# Patient Record
Sex: MALE | Race: WHITE | NOT HISPANIC OR LATINO | Employment: OTHER | URBAN - METROPOLITAN AREA
[De-identification: names, ages, dates, MRNs, and addresses within clinical notes are randomized per-mention and may not be internally consistent; named-entity substitution may affect disease eponyms.]

---

## 2017-01-16 DIAGNOSIS — R52 PAIN: ICD-10-CM

## 2017-01-16 DIAGNOSIS — I81 PVT (PORTAL VEIN THROMBOSIS): ICD-10-CM

## 2017-01-16 DIAGNOSIS — I10 ESSENTIAL HYPERTENSION: ICD-10-CM

## 2017-01-16 NOTE — TELEPHONE ENCOUNTER
Patient called to schedule f/u appointments. Appointments made for 1/31/17 at 11:00 am and labs at 9:00. Medications refilled per patient's request.

## 2017-01-18 NOTE — TELEPHONE ENCOUNTER
----- Message from Maria T White sent at 1/18/2017 12:41 PM CST -----  Contact: self  Patient [phone] called on Monday 01 16 17 for his prescriptions to be refilled/patient said he gave all the prescription information then but the pharmacy still has not received any refill information from Dr Calzada/patient is out of all his medications now/please send as soon as possible and advise patient     [facility] [place] - [place], [place] - [id number] [facility] [address]  [place] [address]  Phone: [phone] Fax: [phone]

## 2017-01-19 ENCOUNTER — TELEPHONE (OUTPATIENT)
Dept: HEMATOLOGY/ONCOLOGY | Facility: CLINIC | Age: 57
End: 2017-01-19

## 2017-01-19 RX ORDER — LISINOPRIL 20 MG/1
20 TABLET ORAL DAILY
Qty: 30 TABLET | Refills: 6 | Status: ON HOLD | OUTPATIENT
Start: 2017-01-19 | End: 2017-02-15 | Stop reason: HOSPADM

## 2017-01-19 RX ORDER — WARFARIN 2 MG/1
2 TABLET ORAL DAILY
Qty: 30 TABLET | Refills: 1 | Status: SHIPPED | OUTPATIENT
Start: 2017-01-19 | End: 2017-02-07

## 2017-01-19 RX ORDER — OXYCODONE HYDROCHLORIDE 30 MG/1
30 TABLET ORAL 4 TIMES DAILY
Qty: 120 TABLET | Refills: 0 | Status: SHIPPED | OUTPATIENT
Start: 2017-01-19

## 2017-01-19 NOTE — TELEPHONE ENCOUNTER
----- Message from Anand Castellano sent at 1/19/2017 12:24 PM CST -----  Contact: pt  Pt is requesting a refill on all his medications  Call Back#[phone]  Thanks    [facility] [place] [facility] - [facility] [address] [facility] [phone] [facility] [address]  [place] [address]  Phone: [phone] Fax: [phone]

## 2017-01-31 ENCOUNTER — HOSPITAL ENCOUNTER (EMERGENCY)
Facility: HOSPITAL | Age: 57
Discharge: OTHER INSTITUTION | End: 2017-01-31
Attending: EMERGENCY MEDICINE
Payer: MEDICAID

## 2017-01-31 ENCOUNTER — HOSPITAL ENCOUNTER (INPATIENT)
Facility: HOSPITAL | Age: 57
LOS: 3 days | Discharge: HOME | DRG: 445 | End: 2017-02-04
Attending: HOSPITALIST | Admitting: HOSPITALIST
Payer: MEDICAID

## 2017-01-31 ENCOUNTER — OFFICE VISIT (OUTPATIENT)
Dept: HEMATOLOGY/ONCOLOGY | Facility: CLINIC | Age: 57
End: 2017-01-31
Payer: MEDICAID

## 2017-01-31 VITALS
DIASTOLIC BLOOD PRESSURE: 70 MMHG | OXYGEN SATURATION: 98 % | BODY MASS INDEX: 30.47 KG/M2 | WEIGHT: 217.63 LBS | HEIGHT: 71 IN | HEART RATE: 96 BPM | RESPIRATION RATE: 16 BRPM | SYSTOLIC BLOOD PRESSURE: 122 MMHG | TEMPERATURE: 98 F

## 2017-01-31 VITALS
RESPIRATION RATE: 16 BRPM | HEART RATE: 103 BPM | DIASTOLIC BLOOD PRESSURE: 56 MMHG | SYSTOLIC BLOOD PRESSURE: 118 MMHG | HEIGHT: 71 IN | TEMPERATURE: 98 F

## 2017-01-31 DIAGNOSIS — Z09 CHEMOTHERAPY FOLLOW-UP EXAMINATION: ICD-10-CM

## 2017-01-31 DIAGNOSIS — C78.7 METASTASIS TO LIVER: ICD-10-CM

## 2017-01-31 DIAGNOSIS — C77.2 MALIGNANT NEOPLASM METASTATIC TO INTRA-ABDOMINAL LYMPH NODE: Primary | ICD-10-CM

## 2017-01-31 DIAGNOSIS — E80.6 HYPERBILIRUBINEMIA: ICD-10-CM

## 2017-01-31 DIAGNOSIS — I81 PORTAL VEIN THROMBOSIS: ICD-10-CM

## 2017-01-31 DIAGNOSIS — C80.1 BILIARY OBSTRUCTION DUE TO CANCER: Primary | ICD-10-CM

## 2017-01-31 DIAGNOSIS — R16.1 SPLENOMEGALY: ICD-10-CM

## 2017-01-31 DIAGNOSIS — R53.1 WEAKNESS: Primary | ICD-10-CM

## 2017-01-31 DIAGNOSIS — C77.2 MALIGNANT NEOPLASM METASTATIC TO INTRA-ABDOMINAL LYMPH NODE: ICD-10-CM

## 2017-01-31 DIAGNOSIS — F41.9 ANXIETY: ICD-10-CM

## 2017-01-31 DIAGNOSIS — R74.8 ELEVATED ALKALINE PHOSPHATASE LEVEL: ICD-10-CM

## 2017-01-31 DIAGNOSIS — R63.4 WEIGHT LOSS: ICD-10-CM

## 2017-01-31 DIAGNOSIS — K83.1 BILIARY OBSTRUCTION DUE TO CANCER: ICD-10-CM

## 2017-01-31 DIAGNOSIS — Z78.9 ALCOHOL USE: ICD-10-CM

## 2017-01-31 DIAGNOSIS — R17 ELEVATED BILIRUBIN: ICD-10-CM

## 2017-01-31 DIAGNOSIS — K83.1 BILIARY OBSTRUCTION DUE TO CANCER: Primary | ICD-10-CM

## 2017-01-31 DIAGNOSIS — C80.1 BILIARY OBSTRUCTION DUE TO CANCER: ICD-10-CM

## 2017-01-31 DIAGNOSIS — R74.8 ABNORMAL LIVER ENZYMES: ICD-10-CM

## 2017-01-31 DIAGNOSIS — C79.9 MULTIPLE LESIONS OF METASTATIC MALIGNANCY: ICD-10-CM

## 2017-01-31 DIAGNOSIS — G89.4 CHRONIC PAIN SYNDROME: ICD-10-CM

## 2017-01-31 LAB
INR PPP: 1.8
PROTHROMBIN TIME: 18.8 SEC

## 2017-01-31 PROCEDURE — 99285 EMERGENCY DEPT VISIT HI MDM: CPT | Mod: 27

## 2017-01-31 PROCEDURE — 25000003 PHARM REV CODE 250: Performed by: EMERGENCY MEDICINE

## 2017-01-31 PROCEDURE — 11000001 HC ACUTE MED/SURG PRIVATE ROOM

## 2017-01-31 PROCEDURE — 36415 COLL VENOUS BLD VENIPUNCTURE: CPT

## 2017-01-31 PROCEDURE — 99213 OFFICE O/P EST LOW 20 MIN: CPT | Mod: PBBFAC,PO | Performed by: INTERNAL MEDICINE

## 2017-01-31 PROCEDURE — 85610 PROTHROMBIN TIME: CPT

## 2017-01-31 PROCEDURE — 25000003 PHARM REV CODE 250: Performed by: HOSPITALIST

## 2017-01-31 PROCEDURE — 99999 PR PBB SHADOW E&M-EST. PATIENT-LVL III: CPT | Mod: PBBFAC,,, | Performed by: INTERNAL MEDICINE

## 2017-01-31 PROCEDURE — G0379 DIRECT REFER HOSPITAL OBSERV: HCPCS

## 2017-01-31 PROCEDURE — 99215 OFFICE O/P EST HI 40 MIN: CPT | Mod: S$PBB,,, | Performed by: INTERNAL MEDICINE

## 2017-01-31 PROCEDURE — G0378 HOSPITAL OBSERVATION PER HR: HCPCS

## 2017-01-31 RX ORDER — OXYCODONE HYDROCHLORIDE 5 MG/1
30 TABLET ORAL EVERY 6 HOURS PRN
Status: DISCONTINUED | OUTPATIENT
Start: 2017-01-31 | End: 2017-02-04 | Stop reason: HOSPADM

## 2017-01-31 RX ORDER — PANTOPRAZOLE SODIUM 40 MG/1
40 TABLET, DELAYED RELEASE ORAL DAILY
Status: DISCONTINUED | OUTPATIENT
Start: 2017-02-01 | End: 2017-02-04 | Stop reason: HOSPADM

## 2017-01-31 RX ORDER — SODIUM CHLORIDE 9 MG/ML
INJECTION, SOLUTION INTRAVENOUS CONTINUOUS
Status: DISCONTINUED | OUTPATIENT
Start: 2017-01-31 | End: 2017-02-01

## 2017-01-31 RX ORDER — HYDROMORPHONE HYDROCHLORIDE 1 MG/ML
1 INJECTION, SOLUTION INTRAMUSCULAR; INTRAVENOUS; SUBCUTANEOUS EVERY 4 HOURS PRN
Status: DISCONTINUED | OUTPATIENT
Start: 2017-01-31 | End: 2017-02-04 | Stop reason: HOSPADM

## 2017-01-31 RX ORDER — ONDANSETRON 4 MG/1
4 TABLET, ORALLY DISINTEGRATING ORAL
Status: COMPLETED | OUTPATIENT
Start: 2017-01-31 | End: 2017-01-31

## 2017-01-31 RX ORDER — ONDANSETRON 8 MG/1
8 TABLET, ORALLY DISINTEGRATING ORAL EVERY 8 HOURS PRN
Status: DISCONTINUED | OUTPATIENT
Start: 2017-01-31 | End: 2017-02-04 | Stop reason: HOSPADM

## 2017-01-31 RX ORDER — ALPRAZOLAM 0.25 MG/1
0.25 TABLET ORAL 3 TIMES DAILY PRN
Status: DISCONTINUED | OUTPATIENT
Start: 2017-01-31 | End: 2017-02-04 | Stop reason: HOSPADM

## 2017-01-31 RX ORDER — PANTOPRAZOLE SODIUM 20 MG/1
20 TABLET, DELAYED RELEASE ORAL DAILY
Refills: 3 | COMMUNITY
Start: 2017-01-19

## 2017-01-31 RX ORDER — OXYCODONE HYDROCHLORIDE 10 MG/1
30 TABLET ORAL EVERY 6 HOURS PRN
Status: DISCONTINUED | OUTPATIENT
Start: 2017-01-31 | End: 2017-01-31

## 2017-01-31 RX ORDER — OXYCODONE HYDROCHLORIDE 30 MG/1
30 TABLET ORAL
Status: COMPLETED | OUTPATIENT
Start: 2017-01-31 | End: 2017-01-31

## 2017-01-31 RX ORDER — LISINOPRIL 20 MG/1
20 TABLET ORAL DAILY
Status: DISCONTINUED | OUTPATIENT
Start: 2017-02-01 | End: 2017-02-04 | Stop reason: HOSPADM

## 2017-01-31 RX ORDER — AMITRIPTYLINE HYDROCHLORIDE 10 MG/1
10 TABLET, FILM COATED ORAL 2 TIMES DAILY
Status: DISCONTINUED | OUTPATIENT
Start: 2017-01-31 | End: 2017-02-04 | Stop reason: HOSPADM

## 2017-01-31 RX ORDER — ACETAMINOPHEN 325 MG/1
650 TABLET ORAL EVERY 4 HOURS PRN
Status: DISCONTINUED | OUTPATIENT
Start: 2017-01-31 | End: 2017-02-04 | Stop reason: HOSPADM

## 2017-01-31 RX ADMIN — AMITRIPTYLINE HYDROCHLORIDE 10 MG: 10 TABLET, FILM COATED ORAL at 08:01

## 2017-01-31 RX ADMIN — ALPRAZOLAM 0.25 MG: 0.25 TABLET ORAL at 08:01

## 2017-01-31 RX ADMIN — SODIUM CHLORIDE: 0.9 INJECTION, SOLUTION INTRAVENOUS at 08:01

## 2017-01-31 RX ADMIN — ONDANSETRON 4 MG: 4 TABLET, ORALLY DISINTEGRATING ORAL at 05:01

## 2017-01-31 RX ADMIN — OXYCODONE HYDROCHLORIDE 30 MG: 30 TABLET ORAL at 05:01

## 2017-01-31 NOTE — IP AVS SNAPSHOT
\[facility] [place] [facility]\""  [address]  [place] [address]  Phone: [phone]           Patient Discharge Instructions     Our goal is to set you up for success. This packet includes information on your condition, medications, and your home care. It will help you to care for yourself so you don't get sicker and need to go back to the hospital.     Please ask your nurse if you have any questions.        There are many details to remember when preparing to leave the hospital. Here is what you will need to do:    1. Take your medicine. If you are prescribed medications, review your Medication List in the following pages. You may have new medications to  at the pharmacy and others that you'll need to stop taking. Review the instructions for how and when to take your medications. Talk with your doctor or nurses if you are unsure of what to do.     2. Go to your follow-up appointments. Specific follow-up information is listed in the following pages. Your may be contacted by a transition nurse or clinical provider about future appointments. Be sure we have all of the phone numbers to reach you, if needed. Please contact your provider's office if you are unable to make an appointment.     3. Watch for warning signs. Your doctor or nurse will give you detailed warning signs to watch for and when to call for assistance. These instructions may also include educational information about your condition. If you experience any of warning signs to your health, call your doctor.               ** Verify the list of medication(s) below is accurate and up to date. Carry this with you in case of emergency. If your medications have changed, please notify your healthcare provider.             Medication List      CHANGE how you take these medications        Additional Info                      oxycodone 30 MG Tab   Commonly known as:  ROXICODONE   Quantity:  120 tablet   Refills:  0   Dose:  30 mg   What changed:    - when  to take this  - reasons to take this    Last time this was given:  30 mg on 2/4/2017  1:57 AM   Instructions:  Take 1 tablet (30 mg total) by mouth 4 (four) times daily.     Begin Date    AM    Noon    PM    Bedtime         CONTINUE taking these medications        Additional Info                      alprazolam 0.25 MG tablet   Commonly known as:  XANAX   Quantity:  90 tablet   Refills:  0   Dose:  0.25 mg    Last time this was given:  0.25 mg on 1/31/2017  8:59 PM   Instructions:  Take 1 tablet (0.25 mg total) by mouth 3 (three) times daily as needed for Anxiety.     Begin Date    AM    Noon    PM    Bedtime       amitriptyline 10 MG tablet   Commonly known as:  ELAVIL   Quantity:  60 tablet   Refills:  1   Dose:  10 mg    Last time this was given:  10 mg on 2/4/2017  8:47 AM   Instructions:  Take 1 tablet (10 mg total) by mouth 2 (two) times daily.     Begin Date    AM    Noon    PM    Bedtime       lisinopril 20 MG tablet   Commonly known as:  PRINIVIL,ZESTRIL   Quantity:  30 tablet   Refills:  6   Dose:  20 mg    Last time this was given:  20 mg on 2/4/2017  8:47 AM   Instructions:  Take 1 tablet (20 mg total) by mouth once daily.     Begin Date    AM    Noon    PM    Bedtime       ondansetron 8 MG tablet   Commonly known as:  ZOFRAN   Quantity:  30 tablet   Refills:  2   Dose:  8 mg    Instructions:  Take 1 tablet (8 mg total) by mouth every 12 (twelve) hours as needed for Nausea.     Begin Date    AM    Noon    PM    Bedtime       pantoprazole 20 MG tablet   Commonly known as:  PROTONIX   Refills:  3   Dose:  20 mg    Last time this was given:  40 mg on 2/4/2017  8:47 AM   Instructions:  Take 20 mg by mouth once daily.     Begin Date    AM    Noon    PM    Bedtime       prochlorperazine 10 MG tablet   Commonly known as:  COMPAZINE   Quantity:  30 tablet   Refills:  1   Dose:  10 mg    Instructions:  Take 1 tablet (10 mg total) by mouth every 6 (six) hours as needed.     Begin Date    AM    Noon    PM     Bedtime       warfarin 2 MG tablet   Commonly known as:  COUMADIN   Quantity:  30 tablet   Refills:  1   Dose:  2 mg    Instructions:  Take 1 tablet (2 mg total) by mouth Daily.     Begin Date    AM    Noon    PM    Bedtime         STOP taking these medications     esomeprazole magnesium 20 mg Tbec   Commonly known as:  NEXIUM 24HR                  Please bring to all follow up appointments:    1. A copy of your discharge instructions.  2. All medicines you are currently taking in their original bottles.  3. Identification and insurance card.    Please arrive 15 minutes ahead of scheduled appointment time.    Please call 24 hours in advance if you must reschedule your appointment and/or time.        Follow-up Information     Follow up with Luis M Ruelas MD.    Specialty:  Family Medicine    Contact information:    [address]  [place] [address]  [phone]          Follow up with Josh Reyna MD. Schedule an appointment as soon as possible for a visit in 3 weeks.    Specialty:  Gastroenterology    Why:  Biliary Stent Follow Up     Contact information:    [address]  [address]  [facility] [address]  [phone]          Follow up with Karoline Calzada MD.    Specialty:  Hematology and Oncology    Contact information:    [address] [place] [address] [address]  [address]  [place] [address]  [phone]          Discharge Instructions     Future Orders    Activity as tolerated     Call MD for:  persistent nausea and vomiting or diarrhea     Call MD for:  severe uncontrolled pain     Call MD for:  temperature >100.4     Diet general     Comments:    Alliance Diet, Low Fat    Questions:    Total calories:      Fat restriction, if any:      Protein restriction, if any:      Na restriction, if any:      Fluid restriction:      Additional restrictions:          Discharge Instructions       Keep any prior scheduled appointments with your PCP and Dr. Calzada.  Resume your Coumadin 2 mg daily, today and follow up with your Prior coumadin  "management in 1 week.  Make an appointment with Dr. Reyna in 2-4 weeks for biliary stent follow up.     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      Admission Information     Date & Time Provider Department CSN    1/31/2017  8:27 PM Noam Hansen MD [facility] [id number]      Care Providers     Provider Role Specialty Primary office phone    Noam Hansen MD Attending Provider Hospitalist [phone]    Helio Edwards MD Consulting Physician  Gastroenterology [phone]    Paolo Eldridge MD Surgeon  Gastroenterology [phone]    Josh Reyna MD Surgeon  Gastroenterology [phone]      Your Vitals Were     BP Pulse Temp Resp Height Weight    101/55 110 98.4 °F (36.9 °C) (Oral) 18 5' 11" (1.803 m) 91.7 kg (202 lb 2.6 oz)    SpO2 BMI             91% 28.2 kg/m2         Recent Lab Values        12/3/2012                           9:24 AM           A1C 5.5                       Allergies as of 2/4/2017     No Known Allergies      [facility]     [facility] Nurse Care Line - 24/7 Assistance  Unless otherwise directed by your provider, please contact [facility], our nurse care line that is available for 24/7 assistance.     Registered nurses in the [facility] provide clinical advisement, health education, appointment booking, and other advisory services.  Call for this free service at [phone].        Advance Directives     An advance directive is a document which, in the event you are no longer able to make decisions for yourself, tells your healthcare team what kind of treatment you do or do not want to receive, or who you would like to make those decisions for you.  If you do not currently have an advance directive, [facility] encourages you to create one.  For more information call:  [phone] " [phone]), [phone] ([phone]),  or log on to [email or web address].        Smoking Cessation     If you would like to quit smoking:   You may be eligible for free services if you are a [place] resident and started smoking cigarettes before September 1, 1988.  Call the Smoking Cessation Trust ([facility]) toll free at ([phone] or (323) 000-3272.   Call -772-QUIT-NOW if you do not meet the above criteria.            Language Assistance Services     ATTENTION: Language assistance services are available, free of charge. Please call [phone].      ATENCIÓN: Si habla toshia, tiene a stahl disposición servicios gratuitos de asistencia lingüística. Llame al [phone].     CHÚ Ý: N?u b?n nói Ti?ng Vi?t, có các d?ch v? h? tr? ngôn ng? mi?n phí dành cho b?n. G?i s? [phone].        Coumadin Discharge Instructions                          [facility] complies with applicable Federal civil rights laws and does not discriminate on the basis of race, color, national origin, age, disability, or sex.

## 2017-01-31 NOTE — PROGRESS NOTES
Seen in follow up for chemotherapy.    This note was dictated with Dragon and briefly proofread. Please excuse any sentences that may be unclear or words misspelled    CHIEF COMPLAINT:  I'm so weak he can't move in my back is killing me    ONCOLOGICAL HISTORY:  The patient was originally diagnosed with colorectal carcinoma   stage II or III in 2010.  He did receive Xeloda as adjuvant therapy.  In May of   2016, he was found to have a hepatic mass in the central right lobe as well as   posterior mediastinal celiac and retroperitoneal adenopathy with portacaval   adenopathy.  Biopsy of the liver lesion was performed on June 9th and findings   were consistent with metastatic adenocarcinoma from a suspected GI primary.  First cycle of FOLFOX plus AVASTIN was at the end of June 2016.  Patient has now completed 12 cycles of chemotherapy as January 3 of 2017    HPI  Today patient is laying on the examination table and appears extremely weak.  This is extremely unusual for this patient's presentation.  He reports that 3 weeks ago started suffering with worsening back pain and decreasing appetite.  He did drive himself to this appointment and he is unaccompanied by any friends or family members.  States he is unable to perform activities of daily living lately due to his weakness.  He feels that he is unsteady on his feet.  Reports he has not been increasing his alcohol intake.  He assumes his symptoms are from his last dose of chemotherapy.  He is not having any shortness of breath, chest palpitations, nausea vomiting, diarrhea and no fever.  Dr. Rapp his pain management physician remains out of practice currently.  Patient's pain remains stable with Roxicodone 30 mg 4 times a day and Elavil twice a day, low dose 10 mg. patient is tolerating Xanax for anxiety   He is not having any hematochezia melena or hematuria.     Clinically it seems that he is losing weight    He is tolerating Zestril for hypertension and Protonix  for GERD without any complications.      Current Outpatient Prescriptions:     alprazolam (XANAX) 0.25 MG tablet, Take 1 tablet (0.25 mg total) by mouth 3 (three) times daily as needed for Anxiety., Disp: 90 tablet, Rfl: 0    amitriptyline (ELAVIL) 10 MG tablet, Take 1 tablet (10 mg total) by mouth 2 (two) times daily., Disp: 60 tablet, Rfl: 1    ibuprofen (ADVIL,MOTRIN) 800 MG tablet, , Disp: , Rfl:     lidocaine-prilocaine (EMLA) cream, Apply to affected area once, Disp: 5 g, Rfl: 0    lisinopril (PRINIVIL,ZESTRIL) 20 MG tablet, Take 1 tablet (20 mg total) by mouth once daily., Disp: 30 tablet, Rfl: 6    ondansetron (ZOFRAN) 8 MG tablet, Take 1 tablet (8 mg total) by mouth every 12 (twelve) hours as needed for Nausea., Disp: 30 tablet, Rfl: 2    oxycodone (ROXICODONE) 30 MG Tab, Take 1 tablet (30 mg total) by mouth 4 (four) times daily., Disp: 120 tablet, Rfl: 0    pantoprazole (PROTONIX) 20 MG tablet, Take 20 mg by mouth once daily., Disp: , Rfl: 3    prochlorperazine (COMPAZINE) 10 MG tablet, Take 1 tablet (10 mg total) by mouth every 6 (six) hours as needed., Disp: 30 tablet, Rfl: 1    promethazine (PHENERGAN) 25 MG tablet, Take 25 tablets by mouth as needed., Disp: , Rfl: 0    warfarin (COUMADIN) 2 MG tablet, Take 1 tablet (2 mg total) by mouth Daily., Disp: 30 tablet, Rfl: 1    REVIEW OF SYSTEMS:    GENERAL:  No fever or chills.  No unexpected change in weight.  HEENT:  No photophobia, rhinorrhea, sinus congestion, tinnitus, gingival   bleeding, oral ulcers, or sore throat.  RESPIRATORY:  No shortness of breath, cough, or wheezing.  CARDIOVASCULAR:  No chest pain, palpitations, or swelling of the lower   extremities.  GASTROINTESTINAL:  No abdominal pain, dysphagia, emesis, diarrhea, or   constipation.  No heartburn, abdominal distention, melena, or hematochezia.  GENITOURINARY:  No urinary frequency, hesitancy, dysuria, or hematuria.  RHEUM:  No arthralgias or joint swelling.  MUSCULOSKELETAL:  No  "neck pain,  chronic back pain,  noss.  NEUROLOGICAL:  No headaches, dizziness, or change in memory.  Positive paresthesias  PSYCH:  No agitation, change in behavior,  today is definitely anxious no depression   ENDOCRINE:  No hot  + cold intolerance.     PHYSICAL EXAMINATION:  Visit Vitals    BP (!) 118/56    Pulse 103    Temp 97.5 °F (36.4 °C)    Resp 16    Ht 5' 11" (1.803 m)       GENERAL:  Gaunt in appearance and appears weak today  PSYCH:  Flat affect.  No anxiety or depression.  HEENT:  Extraocular movements intact.  Conjunctivae pink.  NECK:  Supple.  Trachea midline.  No palpable abnormalities.  LYMPHATICS:  No cervical or axillary adenopathy.  CHEST:  Clear with no use of accessory muscles during respiration.  Heart with tachycardia  ABDOMEN:  No distention  EXTREMITIES:  No cyanosis, clubbing, edema, or joint effusion.  NEUROLOGICAL:  Tired yet oriented  Cranial nerves grossly intact.  SKIN:  Warm, dry. No rash or lesions noted.        Lab Results   Component Value Date    WBC 7.50 01/31/2017    HGB 15.2 01/31/2017    HCT 45.5 01/31/2017    MCV 97 01/31/2017     (L) 01/31/2017     CMP     Ref Range & Units 9:53 AM   1mo ago       Sodium 136 - 145 mmol/L 133 (L) 140    Potassium 3.5 - 5.1 mmol/L 3.8 4.2    Chloride 95 - 110 mmol/L 100 108    CO2 23 - 29 mmol/L 21 (L) 21 (L)    Glucose 70 - 110 mg/dL 108 109    BUN, Bld 6 - 20 mg/dL 9 6    Creatinine 0.5 - 1.4 mg/dL 0.8 0.8    Calcium 8.7 - 10.5 mg/dL 8.9 9.2    Total Protein 6.0 - 8.4 g/dL 6.9 6.6    Albumin 3.5 - 5.2 g/dL 2.7 (L) 3.4 (L)    Total Bilirubin 0.1 - 1.0 mg/dL 2.2 (H) 1.0CM   Comments: For infants and newborns, interpretation of results should be based   on gestational age, weight and in agreement with clinical   observations.   Premature Infant recommended reference ranges:   Up to 24 hours.............<8.0 mg/dL   Up to 48 hours............<12.0 mg/dL   3-5 days..................<15.0 mg/dL   6-29 days.................<15.0 mg/dL "       Alkaline Phosphatase 55 - 135 U/L 303 (H) 250 (H)    AST 10 - 40 U/L 46 (H) 45 (H)    ALT 10 - 44 U/L 19 31    Anion Gap 8 - 16 mmol/L 12 11    eGFR if African American >60 mL/min/1.73 m^2 >60 >60    eGFR if non African American >60 mL/min/1.73 m^2 >60 >60CM   Comments: Calculation used to obtain the estimated glomerular filtration   rate (eGFR) is the CKD-EPI equation. Since race is unknown   in our information system, the eGFR values for   -American and Non--American patients are given              MRI Abdomen W WO Contrast 09/13/2016 met colon cancer          RESULTS:  Routine multiplanar imaging through this 56-year-old males liver was obtained with utilization of 10 cc of gadolinium postcontrast and comparison was made with 5/31/16     Within segment 8 of the liver there is again noted to be an apparent irregular mass that is ill-defined with a slightly larger T2 bright focus in this region spanning 2.7 x 2.6 cm on today's examination in comparison with 1 cm x 9 mm on the prior examination. The more diffuse faint T2 weighted signal abnormality measures measured on the prior does not appear significantly changed. The hypoenhancing region is not as easily defined as on the prior but appears similar in size at 3.5 x 2.5 cm in size on image 24 of sequence 1403. Distal to this mass region there appears to be thrombosis of the portal vein. Altered perfusion of the right lobe of the liver is noted distal to this mass region. The degree of biliary dilatation seen on the prior appears decreased and the degree of perfusion abnormality appears decreased        A prominent lymph node is noted superior to the pancreatic head adjacent to the inferior vena cava a 3.0 x 1.5 cm suggestive of lymphadenopathy unchanged     Multiple renal signal intensities are noted one at the upper pole and the left and one at the upper pole of the right without obvious evidence of enhancement suggestive of renal  cysts.     Some dependent signal intensity is noted layering within the gallbladder suggestive of biliary sludge or stones. Ultrasound could be performed for confirmation if desired.  IMPRESSION:         1. Overall there appears to be some improvement since 5/31/16 with slightly less perfusion abnormality and biliary dilatation identified. The ill-defined mass is again poorly defined and measured but is suspected to measures similarly on the postcontrast sequences with a slightly larger region of increased T2 signal centrally then on the prior exam. This could relate to necrosis and is of uncertain etiology. 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  I'm concerned that he has possible biliary obstruction or some type of cholangitis  I did explain that chemotherapy is taking especially Avastin can cause white matter disease and unsteady gait but this would not explain the hyperbilirubinemia and elevated alkaline phosphatase  Patient may ultimately need some type of MRCP.  I'm hoping he will meet criteria for admission given his stated weakness and dehydration.  His last chemotherapy was on January 3 slight [facility] which according to the records he  tolerated well.  He has been taking his usual pain medication around the clock to help with his back pain.  He remains on low-dose Coumadin for his portal vein thrombosis    I'll touch base with him after he is assessed in the emergency room    History continue his antidepressants are helping with his psych disorder    Cont PPI for GERD  Cont opioids and elavil

## 2017-01-31 NOTE — MR AVS SNAPSHOT
[place] - [facility]  [address] [place] [address] Drive [address]  [facility] [address]  Phone: [phone]                  [place] Villa   2017 11:00 AM   Office Visit    Description:  Male : 1960   Provider:  Karoline Calzada MD   Department:  [place] [facility]           Reason for Visit     Follow-up     Results           Diagnoses this Visit        Comments    Weakness    -  Primary     Weight loss         Hyperbilirubinemia         Elevated alkaline phosphatase level         Chemotherapy follow-up examination         Alcohol use         Metastasis to liver         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  Call for this free service at [phone].             Medications           Message regarding Medications     Verify the changes and/or additions to your medication regime listed below are the same as discussed with your clinician today.  If any of these changes or additions are incorrect, please notify your healthcare provider.        STOP taking these medications     esomeprazole magnesium (NEXIUM 24HR) 20 mg TbEC Take 20 mg by mouth once daily. Pt must take this medication daily while on chemotherapy and steroids for cancer           Verify that the below list of medications is an accurate representation of the medications you are currently taking.  If none reported, the list may be blank. If incorrect, please contact your  "healthcare provider. Carry this list with you in case of emergency.           Current Medications     alprazolam (XANAX) 0.25 MG tablet Take 1 tablet (0.25 mg total) by mouth 3 (three) times daily as needed for Anxiety.    amitriptyline (ELAVIL) 10 MG tablet Take 1 tablet (10 mg total) by mouth 2 (two) times daily.    ibuprofen (ADVIL,MOTRIN) 800 MG tablet     lidocaine-prilocaine (EMLA) cream Apply to affected area once    lisinopril (PRINIVIL,ZESTRIL) 20 MG tablet Take 1 tablet (20 mg total) by mouth once daily.    ondansetron (ZOFRAN) 8 MG tablet Take 1 tablet (8 mg total) by mouth every 12 (twelve) hours as needed for Nausea.    oxycodone (ROXICODONE) 30 MG Tab Take 1 tablet (30 mg total) by mouth 4 (four) times daily.    pantoprazole (PROTONIX) 20 MG tablet Take 20 mg by mouth once daily.    prochlorperazine (COMPAZINE) 10 MG tablet Take 1 tablet (10 mg total) by mouth every 6 (six) hours as needed.    promethazine (PHENERGAN) 25 MG tablet Take 25 tablets by mouth as needed.    warfarin (COUMADIN) 2 MG tablet Take 1 tablet (2 mg total) by mouth Daily.           Clinical Reference Information           Vital Signs - Last Recorded  Most recent update: 1/31/2017 10:47 AM by Eliane Perkins LPN    BP Pulse Temp Resp Ht       (!) 118/56 103 97.5 °F (36.4 °C) 16 5' 11" (1.803 m)       Blood Pressure          Most Recent Value    BP  (!)  118/56      Allergies as of 1/31/2017     No Known Allergies      Immunizations Administered on Date of Encounter - 1/31/2017     None      "

## 2017-01-31 NOTE — ED PROVIDER NOTES
Encounter Date: 1/31/2017    SCRIBE #1 NOTE: IFern, am scribing for, and in the presence of, Dr. Gallagher.       History     Chief Complaint   Patient presents with    Abnormal Lab     Alk Phos and AST elevated. Last chemo for colon / liver cancer was 4 weeks ago.     Review of patient's allergies indicates:  No Known Allergies  HPI Comments: 01/31/2017  12:47 PM     Chief Complaint: Abnormal Labs      The patient is a 56 y.o. male with a PMHx of arthritis; cancer; colon cancer; degenerative disc disease; foot pain; GERD; and HTN who is presenting with an acute onset of abnormal labs of a mildly elevated bilirubin of 2.2 and elevated Alk Phos noted today. Pt seen at Dr. Calzada's office today for generalized weakness, mobility issues, and difficultly with ADLs. Dr. Calzada sent the pt to the ED due to suspicion for possible biliary obstruction due to abnormal labs. Pt has a hx of metastatic colorectal cancer with metastasis to the liver and his last chemotherapy treatment was 4 wks ago. Upon arrival to ED, pt c/o fatigue, generalized weakness, and losing his balance for the past 2-3 wks. He also c/o no appetite, nausea, back pain, and numbness to his finger tips and bottom of his feet. Pt denied any recent falls or head injuries. No change in fluid intake. No SOB. No abd pain, vomiting, or diarrhea. No difficultly urinating. No swelling. No rashes. No headache. No confusion. Pt has a past surgical history that includes Colon surgery.      The history is provided by the patient and medical records.     Past Medical History   Diagnosis Date    Arthritis      sacroilitis    Cancer 1999     colon cancer    Cataract     Colon cancer     Degenerative disc disease     Foot pain     GERD (gastroesophageal reflux disease)     Hypertension      No past medical history pertinent negatives.  Past Surgical History   Procedure Laterality Date    Colon surgery      Cataract extraction Bilateral      Family  History   Problem Relation Age of Onset    Cancer Mother      ovarian    Cancer Father      bladder    COPD Brother     Cancer Brother      lung    Diabetes Brother     Cancer Paternal Grandfather      Social History   Substance Use Topics    Smoking status: Former Smoker     Packs/day: 0.50     Years: 2.00    Smokeless tobacco: Former User      Comment: smokless tobacco when a teenager     Alcohol use No     Review of Systems   Constitutional: Positive for appetite change (No appetite.) and fatigue. Negative for fever.        No change in fluid intake.   HENT: Negative for sore throat.    Eyes: Negative for visual disturbance.   Respiratory: Negative for shortness of breath.    Cardiovascular: Negative for chest pain.   Gastrointestinal: Positive for nausea. Negative for abdominal pain, diarrhea and vomiting.   Genitourinary: Negative for difficulty urinating.   Musculoskeletal: Positive for back pain.        No swelling.   Skin: Negative for rash.   Neurological: Positive for weakness (Generalized weakness.) and numbness (Numbness to finger tips and bottom of feet.). Negative for headaches.   Hematological: Does not bruise/bleed easily.        +Abnormal labs of mildly elevated bilirubin of 2.2 and elevated Alk Phos.   Psychiatric/Behavioral: Negative for confusion.       Physical Exam   Initial Vitals   BP Pulse Resp Temp SpO2   01/31/17 1211 01/31/17 1211 01/31/17 1211 01/31/17 1211 01/31/17 1211   118/78 110 16 98.1 °F (36.7 °C) 96 %     Physical Exam    Nursing note and vitals reviewed.  Constitutional: He appears well-developed and well-nourished.   HENT:   Head: Normocephalic and atraumatic.   Mouth/Throat: Oropharynx is clear and moist.   Eyes: Conjunctivae and EOM are normal. Pupils are equal, round, and reactive to light.   Neck:   No midline C-spine tenderness.   Cardiovascular: Normal rate, regular rhythm, normal heart sounds and intact distal pulses. Exam reveals no gallop and no friction rub.     No murmur heard.  Pulmonary/Chest: Breath sounds normal. He has no wheezes. He has no rhonchi. He has no rales.   Abdominal: Soft. There is no hepatomegaly. There is no tenderness.   Musculoskeletal:   No midline T-spine or L-spine tenderness.   Neurological: He is alert and oriented to person, place, and time.   Skin:   No jaundice noted.   Psychiatric: He has a normal mood and affect.         ED Course   Procedures  Labs Reviewed   PROTIME-INR - Abnormal; Notable for the following:        Result Value    Prothrombin Time 18.8 (*)     INR 1.8 (*)     All other components within normal limits                        Scribe Attestation:   Scribe #1: I performed the above scribed service and the documentation accurately describes the services I performed. I attest to the accuracy of the note.    Attending Attestation:           Physician Attestation for Scribe:  Physician Attestation Statement for Scribe #1: I, Dr. Gallagher, reviewed documentation, as scribed by Fern Castro in my presence, and it is both accurate and complete.         Berlin Villa is a 56 y.o. male presenting with known metastatic liver disease with new hyperbilirubinemia likely representing obstructive process secondary to mass effect of known malignancy.  MRCP demonstrates worsening obstructive process compared to prior imaging.  He will be transferred for GI availability and consideration of ERCP with possible further intervention.  Bilirubin may be trended as this is a new finding.  Nonspecific generalized symptoms possibly related to underlying malignancy as well as new hyperbilirubinemia.  Nonfocal neurological exam with very low suspicion for central neurologic process.  Patient will be transferred for GI and ERCP interventional capability.        ED Course     Clinical Impression:   The primary encounter diagnosis was Biliary obstruction due to cancer. A diagnosis of Multiple lesions of metastatic malignancy was also pertinent to  this visit.          Marvin Gallagher MD  01/31/17 3967

## 2017-01-31 NOTE — ED NOTES
Pt here for eval of fatigue, decreased appetite, weakness, back pain for several days. Also sent by oncologist for elevated bilirubin and to r/o obstruction. Pt appears fatigued. Aaox3. NAD. resp even and unlabored. Denies n/v/d fever or chills. Denies CP or SOB. Denies abd pain. Equal strength bilat.

## 2017-02-01 PROBLEM — I81 PORTAL VEIN THROMBOSIS: Status: ACTIVE | Noted: 2017-02-01

## 2017-02-01 LAB
ALBUMIN SERPL BCP-MCNC: 2.3 G/DL
ALP SERPL-CCNC: 237 U/L
ALT SERPL W/O P-5'-P-CCNC: 15 U/L
ANION GAP SERPL CALC-SCNC: 9 MMOL/L
AST SERPL-CCNC: 36 U/L
BASOPHILS # BLD AUTO: 0.01 K/UL
BASOPHILS NFR BLD: 0.2 %
BILIRUB DIRECT SERPL-MCNC: 0.9 MG/DL
BILIRUB SERPL-MCNC: 1.6 MG/DL
BUN SERPL-MCNC: 11 MG/DL
CALCIUM SERPL-MCNC: 8.2 MG/DL
CHLORIDE SERPL-SCNC: 102 MMOL/L
CO2 SERPL-SCNC: 21 MMOL/L
CREAT SERPL-MCNC: 0.6 MG/DL
DIFFERENTIAL METHOD: ABNORMAL
EOSINOPHIL # BLD AUTO: 0 K/UL
EOSINOPHIL NFR BLD: 0.5 %
ERYTHROCYTE [DISTWIDTH] IN BLOOD BY AUTOMATED COUNT: 16 %
EST. GFR  (AFRICAN AMERICAN): >60 ML/MIN/1.73 M^2
EST. GFR  (NON AFRICAN AMERICAN): >60 ML/MIN/1.73 M^2
GLUCOSE SERPL-MCNC: 80 MG/DL
HCT VFR BLD AUTO: 39.2 %
HGB BLD-MCNC: 13.2 G/DL
INR PPP: 2.2
LYMPHOCYTES # BLD AUTO: 1.1 K/UL
LYMPHOCYTES NFR BLD: 19 %
MAGNESIUM SERPL-MCNC: 1.8 MG/DL
MCH RBC QN AUTO: 33 PG
MCHC RBC AUTO-ENTMCNC: 33.7 %
MCV RBC AUTO: 98 FL
MONOCYTES # BLD AUTO: 1.1 K/UL
MONOCYTES NFR BLD: 18.3 %
NEUTROPHILS # BLD AUTO: 3.7 K/UL
NEUTROPHILS NFR BLD: 61.8 %
PHOSPHATE SERPL-MCNC: 2.7 MG/DL
PLATELET # BLD AUTO: 102 K/UL
PMV BLD AUTO: 9.7 FL
POTASSIUM SERPL-SCNC: 3.6 MMOL/L
PROT SERPL-MCNC: 5.9 G/DL
PROTHROMBIN TIME: 22.4 SEC
RBC # BLD AUTO: 4 M/UL
SODIUM SERPL-SCNC: 132 MMOL/L
WBC # BLD AUTO: 6.01 K/UL

## 2017-02-01 PROCEDURE — 80076 HEPATIC FUNCTION PANEL: CPT

## 2017-02-01 PROCEDURE — 36415 COLL VENOUS BLD VENIPUNCTURE: CPT

## 2017-02-01 PROCEDURE — 94761 N-INVAS EAR/PLS OXIMETRY MLT: CPT

## 2017-02-01 PROCEDURE — 99233 SBSQ HOSP IP/OBS HIGH 50: CPT | Mod: ,,, | Performed by: INTERNAL MEDICINE

## 2017-02-01 PROCEDURE — 25000003 PHARM REV CODE 250: Performed by: PHYSICIAN ASSISTANT

## 2017-02-01 PROCEDURE — 11000001 HC ACUTE MED/SURG PRIVATE ROOM

## 2017-02-01 PROCEDURE — 84100 ASSAY OF PHOSPHORUS: CPT

## 2017-02-01 PROCEDURE — 83735 ASSAY OF MAGNESIUM: CPT

## 2017-02-01 PROCEDURE — 85025 COMPLETE CBC W/AUTO DIFF WBC: CPT

## 2017-02-01 PROCEDURE — 80048 BASIC METABOLIC PNL TOTAL CA: CPT

## 2017-02-01 PROCEDURE — 85610 PROTHROMBIN TIME: CPT

## 2017-02-01 PROCEDURE — 25000003 PHARM REV CODE 250: Performed by: HOSPITALIST

## 2017-02-01 RX ORDER — SODIUM CHLORIDE 9 MG/ML
INJECTION, SOLUTION INTRAVENOUS CONTINUOUS
Status: DISCONTINUED | OUTPATIENT
Start: 2017-02-02 | End: 2017-02-03

## 2017-02-01 RX ORDER — PHYTONADIONE 5 MG/1
5 TABLET ORAL ONCE
Status: COMPLETED | OUTPATIENT
Start: 2017-02-01 | End: 2017-02-01

## 2017-02-01 RX ADMIN — OXYCODONE HYDROCHLORIDE 30 MG: 5 TABLET ORAL at 06:02

## 2017-02-01 RX ADMIN — AMITRIPTYLINE HYDROCHLORIDE 10 MG: 10 TABLET, FILM COATED ORAL at 08:02

## 2017-02-01 RX ADMIN — SODIUM CHLORIDE 1000 ML: 0.9 INJECTION, SOLUTION INTRAVENOUS at 11:02

## 2017-02-01 RX ADMIN — SODIUM CHLORIDE: 0.9 INJECTION, SOLUTION INTRAVENOUS at 08:02

## 2017-02-01 RX ADMIN — OXYCODONE HYDROCHLORIDE 30 MG: 5 TABLET ORAL at 09:02

## 2017-02-01 RX ADMIN — PANTOPRAZOLE SODIUM 40 MG: 40 TABLET, DELAYED RELEASE ORAL at 08:02

## 2017-02-01 RX ADMIN — LISINOPRIL 20 MG: 20 TABLET ORAL at 08:02

## 2017-02-01 RX ADMIN — PHYTONADIONE 5 MG: 5 TABLET ORAL at 08:02

## 2017-02-01 NOTE — SUBJECTIVE & OBJECTIVE
Past Medical History   Diagnosis Date    Arthritis      sacroilitis    Cancer 1999     colon cancer    Cataract     Colon cancer     Degenerative disc disease     Foot pain     GERD (gastroesophageal reflux disease)     Hypertension        Past Surgical History   Procedure Laterality Date    Colon surgery      Cataract extraction Bilateral        Review of patient's allergies indicates:  No Known Allergies    Current Facility-Administered Medications on File Prior to Encounter   Medication    [COMPLETED] ondansetron disintegrating tablet 4 mg    [COMPLETED] oxycodone immediate release tablet 30 mg     Current Outpatient Prescriptions on File Prior to Encounter   Medication Sig    alprazolam (XANAX) 0.25 MG tablet Take 1 tablet (0.25 mg total) by mouth 3 (three) times daily as needed for Anxiety.    amitriptyline (ELAVIL) 10 MG tablet Take 1 tablet (10 mg total) by mouth 2 (two) times daily.    ibuprofen (ADVIL,MOTRIN) 800 MG tablet Take 800 mg by mouth 3 (three) times daily as needed for Pain.     lidocaine-prilocaine (EMLA) cream Apply to affected area once    lisinopril (PRINIVIL,ZESTRIL) 20 MG tablet Take 1 tablet (20 mg total) by mouth once daily.    ondansetron (ZOFRAN) 8 MG tablet Take 1 tablet (8 mg total) by mouth every 12 (twelve) hours as needed for Nausea.    oxycodone (ROXICODONE) 30 MG Tab Take 1 tablet (30 mg total) by mouth 4 (four) times daily. (Patient taking differently: Take 30 mg by mouth every 6 (six) hours as needed (pain). )    pantoprazole (PROTONIX) 20 MG tablet Take 20 mg by mouth once daily.    prochlorperazine (COMPAZINE) 10 MG tablet Take 1 tablet (10 mg total) by mouth every 6 (six) hours as needed.    promethazine (PHENERGAN) 25 MG tablet Take 25 tablets by mouth as needed.    warfarin (COUMADIN) 2 MG tablet Take 1 tablet (2 mg total) by mouth Daily.     Family History     Problem Relation (Age of Onset)    COPD Brother    Cancer Mother, Father, Brother, Paternal  Grandfather    Diabetes Brother        Social History Main Topics    Smoking status: Former Smoker     Packs/day: 0.50     Years: 2.00    Smokeless tobacco: Former User      Comment: smokless tobacco when a teenager     Alcohol use No    Drug use: No    Sexual activity: Yes     Partners: Female     Review of Systems   Constitutional: Positive for fatigue. Negative for chills and fever.   HENT: Negative for congestion, postnasal drip, sneezing and sore throat.    Eyes: Negative for discharge, redness and itching.   Respiratory: Negative for cough, shortness of breath and wheezing.    Cardiovascular: Negative for chest pain, palpitations and leg swelling.   Gastrointestinal: Negative for abdominal distention, abdominal pain, blood in stool, constipation, diarrhea, nausea and vomiting.   Endocrine: Negative for polydipsia, polyphagia and polyuria.   Genitourinary: Negative for difficulty urinating, dysuria, flank pain, frequency, hematuria and urgency.   Musculoskeletal: Positive for back pain. Negative for arthralgias and myalgias.   Skin: Negative for pallor, rash and wound.   Neurological: Positive for weakness. Negative for dizziness, syncope, light-headedness, numbness and headaches.   Psychiatric/Behavioral: Negative for agitation and confusion. The patient is not nervous/anxious.      Objective:     Vital Signs (Most Recent):  Temp: 98.9 °F (37.2 °C) (01/31/17 2100)  Pulse: 97 (01/31/17 2100)  Resp: 16 (01/31/17 2100)  BP: 107/72 (01/31/17 2100)  SpO2: 98 % (02/01/17 0300) Vital Signs (24h Range):  Temp:  [97.5 °F (36.4 °C)-98.9 °F (37.2 °C)] 98.9 °F (37.2 °C)  Pulse:  [] 97  Resp:  [16] 16  SpO2:  [96 %-98 %] 98 %  BP: (107-122)/(56-78) 107/72     Weight: 91.7 kg (202 lb 2.6 oz)  Body mass index is 28.2 kg/(m^2).    Physical Exam   Constitutional: He is oriented to person, place, and time. He appears well-developed and well-nourished. No distress.   Sleeping soundly after receiving pain medicine.   Awakens easily    HENT:   Head: Normocephalic and atraumatic.   Mouth/Throat: Oropharynx is clear and moist. No oropharyngeal exudate.   Eyes: EOM are normal. Pupils are equal, round, and reactive to light.   Neck: Normal range of motion. Neck supple. No thyromegaly present.   Cardiovascular: Normal rate and regular rhythm.    No murmur heard.  Pulmonary/Chest: Effort normal and breath sounds normal. No respiratory distress. He has no wheezes. He exhibits no tenderness.   Abdominal: Soft. Bowel sounds are normal. He exhibits distension. There is no tenderness. There is no rebound and no guarding.   Questionable ascites    Musculoskeletal: He exhibits no edema.   Neurological: He is alert and oriented to person, place, and time.   Skin: Skin is warm and dry. No rash noted. He is not diaphoretic.   Psychiatric: He has a normal mood and affect. Thought content normal.   Nursing note and vitals reviewed.       Significant Labs:   CBC:   Recent Labs  Lab 01/31/17  0950   WBC 7.50   HGB 15.2   HCT 45.5   *     CMP:   Recent Labs  Lab 01/31/17  0953   *   K 3.8      CO2 21*      BUN 9   CREATININE 0.8   CALCIUM 8.9   PROT 6.9   ALBUMIN 2.7*   BILITOT 2.2*   ALKPHOS 303*   AST 46*   ALT 19   ANIONGAP 12   EGFRNONAA >60     Coagulation:   Recent Labs  Lab 01/31/17  1707   INR 1.8*       Significant Imaging:     MRI MRCP without contrast 1/31/2017  1. Right hepatic lobe mass, with apparent interval enlargement. Associated biliary obstruction has mildly worsened. Occlusion of the right portal vein is also present as before, with evidence of portal hypertension including splenomegaly and new, small volume ascites.  2. Moderate gallbladder wall edema. This is most finding as is commonly seen in the setting of ascites.  3. Enlarged peripancreatic lymph node, mildly increased in size.

## 2017-02-01 NOTE — ASSESSMENT & PLAN NOTE
Abnormal liver enzymes  Elevated bilirubin  MRI MRCP as above.  Elevated alk phos and t bili.   Dr. Abel GI Consult:   Elevated LFTs/Biliary obstruction/Weakness  - Vit K given, will check so INR improves  - plan for ERCP with possible stent placement  - risk factors: metastatic cancer, htn, chronic pain  - monitor LFTs  - ok for enteral nutrition today

## 2017-02-01 NOTE — ASSESSMENT & PLAN NOTE
Plt 136 today, improving with outpatient transfusion of platelets.  On warfarin for h/o portal vein thrombosis.  No additional anticoagulation.  No visible bleeding. Holding. Vit K. Today   Monitor.

## 2017-02-01 NOTE — PROGRESS NOTES
Outside Transfer Summary Note    Transferring Physician or Mid Level Provider/Speciality: Emergency Medicine - Dr. Gallagher    Transferring Facility/Hospital: [facility]    Accepting Physician/CORI: Noam Hansen     Date of Acceptance: 01/31/2017     Code Status: Full    Allergies: NKDA    Reason for Transfer to [facility]: ERCP    Report from Transferring Physician or Mid-Level provider/Hospital course: Mr. Villa is a 55 yo WM with metastatic colon cancer to the liver that is undergoing chemotherapy with Dr. Calzada that presented to her office today complaining of increased back/abdominal pain. He was found to have elevation of his TBili to 2.2 from 1 a month prior. He has levations in his AST and Alk Phos as well. He underwent MRI/MRCP today of his abdomen that showed enlargement of the hepatic mass and evidence of right hepatic duct obstruction. He is being transferred to [facility] for evaluation by GI and consideration of ERCP and biliary stenting if needed.     Please call [facility] upon patient arrival to floor.      Noam Hansen MD  Spectralinks: [phone] / [phone]

## 2017-02-01 NOTE — PROGRESS NOTES
[facility]  Progress Note    Patient Name: Berlin Villa  MRN: [id number]  Patient Class: IP- Inpatient   Admission Date: 1/31/2017  Length of Stay: 0 days  Attending Physician: Noam Hansen MD  Primary Care Provider: Luis M Ruelas MD        Subjective:     Principal Problem:Biliary obstruction due to cancer    HPI:  55 yo WM with metastatic colon cancer(originally diagnosed in 2010) to the liver (May 2016) that is currently undergoing chemotherapy with Dr. Calzada that presented to her office today complaining of increased back/abdominal pain. He was found to have elevation of his TBili to 2.2 from 1 a month prior. He has elevations in his AST and Alk Phos as well. He underwent MRI/MRCP today of his abdomen that showed enlargement of the hepatic mass and evidence of right hepatic duct obstruction. He is being transferred to [facility] from [facility] for evaluation by GI and consideration of ERCP and biliary stenting if needed.    His main complaint is back pain for the past month which he describes as bilateral and constant.  States spending a lot of time in bed because of it.  Denies CP, SOB, abdominal pain, N/V/D.      Denies current smoking. States occasional alcohol.  Denies use of illicit drugs.            Hospital Course:       Interval History:   Back pain is under control. Vit K today for INR of 2.2.  Plans for plan for ERCP with possible stent placement tomorrow. NPO after MN    Review of Systems   Constitutional: Positive for fatigue. Negative for chills and fever.   HENT: Negative for congestion.    Respiratory: Negative for shortness of breath and wheezing.    Cardiovascular: Negative for chest pain and palpitations.   Gastrointestinal: Negative for abdominal distention, abdominal pain, nausea and vomiting.   Genitourinary: Negative for urgency.   Musculoskeletal: Positive for back pain. Negative for arthralgias and myalgias.   Skin: Negative for pallor,  rash and wound.     Objective:     Vital Signs (Most Recent):  Temp: 98.3 °F (36.8 °C) (02/01/17 1209)  Pulse: 94 (02/01/17 1209)  Resp: 20 (02/01/17 1209)  BP: (!) 101/57 (02/01/17 1209)  SpO2: (!) 92 % (02/01/17 1213) Vital Signs (24h Range):  Temp:  [96.5 °F (35.8 °C)-98.9 °F (37.2 °C)] 98.3 °F (36.8 °C)  Pulse:  [] 94  Resp:  [16-20] 20  SpO2:  [92 %-98 %] 92 %  BP: (101-122)/(57-72) 101/57     Weight: 91.7 kg (202 lb 2.6 oz)  Body mass index is 28.2 kg/(m^2).  No intake or output data in the 24 hours ending 02/01/17 1517   Physical Exam   Constitutional: He is oriented to person, place, and time. He appears well-developed. No distress.   HENT:   Head: Normocephalic and atraumatic.   Mouth/Throat: Oropharynx is clear and moist. No oropharyngeal exudate.   Eyes: EOM are normal. Pupils are equal, round, and reactive to light.   Neck: Neck supple.   Cardiovascular: Normal rate and regular rhythm.    No murmur heard.  Pulmonary/Chest: Effort normal and breath sounds normal. No respiratory distress. He has no wheezes. He exhibits no tenderness.   Abdominal: Soft. Bowel sounds are normal. He exhibits distension. There is no tenderness.   Questionable ascites    Musculoskeletal: He exhibits no edema.   Neurological: He is alert and oriented to person, place, and time.   Skin: Skin is warm and dry. No rash noted. He is not diaphoretic.   Psychiatric: He has a normal mood and affect. Thought content normal.   Nursing note and vitals reviewed.      Significant Labs:   CBC:   Recent Labs  Lab 01/31/17  0950 02/01/17  0503   WBC 7.50 6.01   HGB 15.2 13.2*   HCT 45.5 39.2*   * 102*     CMP:   Recent Labs  Lab 01/31/17  0953 02/01/17  0503   * 132*   K 3.8 3.6    102   CO2 21* 21*    80   BUN 9 11   CREATININE 0.8 0.6   CALCIUM 8.9 8.2*   PROT 6.9 5.9*   ALBUMIN 2.7* 2.3*   BILITOT 2.2* 1.6*   ALKPHOS 303* 237*   AST 46* 36   ALT 19 15   ANIONGAP 12 9   EGFRNONAA >60 >60       Significant  Imaging:  No new    Assessment/Plan:      * Biliary obstruction due to cancer  Abnormal liver enzymes  Elevated bilirubin  MRI MRCP as above.  Elevated alk phos and t bili.   Dr. Abel GI Consult:   Elevated LFTs/Biliary obstruction/Weakness  - Vit K given, will check so INR improves  - plan for ERCP with possible stent placement  - risk factors: metastatic cancer, htn, chronic pain  - monitor LFTs  - ok for enteral nutrition today      HTN (hypertension), benign  -122.  Resume home lisinopril 20 mg with parameters. Monitor.       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 Calzada.      Anxiety  Pt calm at present.  Takes alprazolam 0.25 mg TID prn which will be resumed here.        Chemotherapy-induced thrombocytopenia  Plt 136 today, improving with outpatient transfusion of platelets.  On warfarin for h/o portal vein thrombosis.  No additional anticoagulation.  No visible bleeding. Holding. Vit K. Today   Monitor.         VTE Risk Mitigation         Ordered     Medium Risk of VTE  Once      01/31/17 2034     Place sequential compression device  Until discontinued      01/31/17 2034     Place RENATA hose  Until discontinued      01/31/17 2034          Zara Garcia NP  Department of Hospital Medicine   [facility]

## 2017-02-01 NOTE — SUBJECTIVE & OBJECTIVE
Interval History:   Back pain is under control. Vit K today for INR of 2.2.  Plans for plan for ERCP with possible stent placement tomorrow. NPO after MN    Review of Systems   Constitutional: Positive for fatigue. Negative for chills and fever.   HENT: Negative for congestion.    Respiratory: Negative for shortness of breath and wheezing.    Cardiovascular: Negative for chest pain and palpitations.   Gastrointestinal: Negative for abdominal distention, abdominal pain, nausea and vomiting.   Genitourinary: Negative for urgency.   Musculoskeletal: Positive for back pain. Negative for arthralgias and myalgias.   Skin: Negative for pallor, rash and wound.     Objective:     Vital Signs (Most Recent):  Temp: 98.3 °F (36.8 °C) (02/01/17 1209)  Pulse: 94 (02/01/17 1209)  Resp: 20 (02/01/17 1209)  BP: (!) 101/57 (02/01/17 1209)  SpO2: (!) 92 % (02/01/17 1213) Vital Signs (24h Range):  Temp:  [96.5 °F (35.8 °C)-98.9 °F (37.2 °C)] 98.3 °F (36.8 °C)  Pulse:  [] 94  Resp:  [16-20] 20  SpO2:  [92 %-98 %] 92 %  BP: (101-122)/(57-72) 101/57     Weight: 91.7 kg (202 lb 2.6 oz)  Body mass index is 28.2 kg/(m^2).  No intake or output data in the 24 hours ending 02/01/17 9467   Physical Exam   Constitutional: He is oriented to person, place, and time. He appears well-developed. No distress.   HENT:   Head: Normocephalic and atraumatic.   Mouth/Throat: Oropharynx is clear and moist. No oropharyngeal exudate.   Eyes: EOM are normal. Pupils are equal, round, and reactive to light.   Neck: Neck supple.   Cardiovascular: Normal rate and regular rhythm.    No murmur heard.  Pulmonary/Chest: Effort normal and breath sounds normal. No respiratory distress. He has no wheezes. He exhibits no tenderness.   Abdominal: Soft. Bowel sounds are normal. He exhibits distension. There is no tenderness.   Questionable ascites    Musculoskeletal: He exhibits no edema.   Neurological: He is alert and oriented to person, place, and time.   Skin: Skin  is warm and dry. No rash noted. He is not diaphoretic.   Psychiatric: He has a normal mood and affect. Thought content normal.   Nursing note and vitals reviewed.      Significant Labs:   CBC:   Recent Labs  Lab 01/31/17  0950 02/01/17  0503   WBC 7.50 6.01   HGB 15.2 13.2*   HCT 45.5 39.2*   * 102*     CMP:   Recent Labs  Lab 01/31/17  0953 02/01/17  0503   * 132*   K 3.8 3.6    102   CO2 21* 21*    80   BUN 9 11   CREATININE 0.8 0.6   CALCIUM 8.9 8.2*   PROT 6.9 5.9*   ALBUMIN 2.7* 2.3*   BILITOT 2.2* 1.6*   ALKPHOS 303* 237*   AST 46* 36   ALT 19 15   ANIONGAP 12 9   EGFRNONAA >60 >60       Significant Imaging:  No new

## 2017-02-01 NOTE — PLAN OF CARE
Patient states his son will pick him up when he is dc'd, is on warfarin which is managed by his heme-onc MD Dr. Calzada, hx of colon ca with chemo.     02/01/17 0849   Discharge Assessment   Assessment Type Discharge Planning Assessment   Confirmed/corrected address and phone number on facesheet? Yes   Assessment information obtained from? Patient   Communicated expected length of stay with patient/caregiver yes   Prior to hospitilization cognitive status: Alert/Oriented   Prior to hospitalization functional status: Independent   Current cognitive status: Alert/Oriented   Current Functional Status: Independent   Arrived From home or self-care   Lives With alone   Able to Return to Prior Arrangements yes   Is patient able to care for self after discharge? Yes   How many people do you have in your home that can help with your care after discharge? 0   Who are your caregiver(s) and their phone number(s)? Berlin Villa Son     [phone]    Patient's perception of discharge disposition home or selfcare   Readmission Within The Last 30 Days no previous admission in last 30 days   Patient currently being followed by outpatient case management? No   Patient currently receives home health services? No   Does the patient currently use HME? No   Patient currently receives private duty nursing? No   Patient currently receives any other outside agency services? No   Equipment Currently Used at Home none   Do you have any problems affording any of your prescribed medications? No   Is the patient taking medications as prescribed? yes   Do you have any financial concerns preventing you from receiving the healthcare you need? No   Does the patient have transportation to healthcare appointments? Yes   Transportation Available family or friend will provide;car   On Dialysis? No   Does the patient receive services at the Coumadin Clinic? (states he is on warfarin and it is managed by his heme-onc MD Dr. Calzada)   Discharge Plan  A Home   Patient/Family In Agreement With Plan yes   Trung Pickering, RN  Transitional Navigator/Case Management  [phone]

## 2017-02-01 NOTE — H&P
[facility] Medicine  History & Physical    Patient Name: Berlin Villa  MRN: [id number]  Admission Date: 1/31/2017  Attending Physician: Noam Hansen MD   Primary Care Provider: Luis M Ruelas MD         Patient information was obtained from patient, past medical records and ER records.     Subjective:     Principal Problem:Biliary obstruction due to cancer    Chief Complaint:  Weakness, back pain      HPI: 57 yo WM with metastatic colon cancer(originally diagnosed in 2010) to the liver (May 2016) that is currently undergoing chemotherapy with Dr. Calzada that presented to her office today complaining of increased back/abdominal pain. He was found to have elevation of his TBili to 2.2 from 1 a month prior. He has elevations in his AST and Alk Phos as well. He underwent MRI/MRCP today of his abdomen that showed enlargement of the hepatic mass and evidence of right hepatic duct obstruction. He is being transferred to [facility] from [facility] for evaluation by GI and consideration of ERCP and biliary stenting if needed.    His main complaint is back pain for the past month which he describes as bilateral and constant.  States spending a lot of time in bed because of it.  Denies CP, SOB, abdominal pain, N/V/D.      Denies current smoking. States occasional alcohol.  Denies use of illicit drugs.            Past Medical History   Diagnosis Date    Arthritis      sacroilitis    Cancer 1999     colon cancer    Cataract     Colon cancer     Degenerative disc disease     Foot pain     GERD (gastroesophageal reflux disease)     Hypertension        Past Surgical History   Procedure Laterality Date    Colon surgery      Cataract extraction Bilateral        Review of patient's allergies indicates:  No Known Allergies    Current Facility-Administered Medications on File Prior to Encounter   Medication    [COMPLETED] ondansetron disintegrating tablet 4 mg    [COMPLETED] oxycodone  immediate release tablet 30 mg     Current Outpatient Prescriptions on File Prior to Encounter   Medication Sig    alprazolam (XANAX) 0.25 MG tablet Take 1 tablet (0.25 mg total) by mouth 3 (three) times daily as needed for Anxiety.    amitriptyline (ELAVIL) 10 MG tablet Take 1 tablet (10 mg total) by mouth 2 (two) times daily.    ibuprofen (ADVIL,MOTRIN) 800 MG tablet Take 800 mg by mouth 3 (three) times daily as needed for Pain.     lidocaine-prilocaine (EMLA) cream Apply to affected area once    lisinopril (PRINIVIL,ZESTRIL) 20 MG tablet Take 1 tablet (20 mg total) by mouth once daily.    ondansetron (ZOFRAN) 8 MG tablet Take 1 tablet (8 mg total) by mouth every 12 (twelve) hours as needed for Nausea.    oxycodone (ROXICODONE) 30 MG Tab Take 1 tablet (30 mg total) by mouth 4 (four) times daily. (Patient taking differently: Take 30 mg by mouth every 6 (six) hours as needed (pain). )    pantoprazole (PROTONIX) 20 MG tablet Take 20 mg by mouth once daily.    prochlorperazine (COMPAZINE) 10 MG tablet Take 1 tablet (10 mg total) by mouth every 6 (six) hours as needed.    promethazine (PHENERGAN) 25 MG tablet Take 25 tablets by mouth as needed.    warfarin (COUMADIN) 2 MG tablet Take 1 tablet (2 mg total) by mouth Daily.     Family History     Problem Relation (Age of Onset)    COPD Brother    Cancer Mother, Father, Brother, Paternal Grandfather    Diabetes Brother        Social History Main Topics    Smoking status: Former Smoker     Packs/day: 0.50     Years: 2.00    Smokeless tobacco: Former User      Comment: smokless tobacco when a teenager     Alcohol use No    Drug use: No    Sexual activity: Yes     Partners: Female     Review of Systems   Constitutional: Positive for fatigue. Negative for chills and fever.   HENT: Negative for congestion, postnasal drip, sneezing and sore throat.    Eyes: Negative for discharge, redness and itching.   Respiratory: Negative for cough, shortness of breath and  wheezing.    Cardiovascular: Negative for chest pain, palpitations and leg swelling.   Gastrointestinal: Negative for abdominal distention, abdominal pain, blood in stool, constipation, diarrhea, nausea and vomiting.   Endocrine: Negative for polydipsia, polyphagia and polyuria.   Genitourinary: Negative for difficulty urinating, dysuria, flank pain, frequency, hematuria and urgency.   Musculoskeletal: Positive for back pain. Negative for arthralgias and myalgias.   Skin: Negative for pallor, rash and wound.   Neurological: Positive for weakness. Negative for dizziness, syncope, light-headedness, numbness and headaches.   Psychiatric/Behavioral: Negative for agitation and confusion. The patient is not nervous/anxious.      Objective:     Vital Signs (Most Recent):  Temp: 98.9 °F (37.2 °C) (01/31/17 2100)  Pulse: 97 (01/31/17 2100)  Resp: 16 (01/31/17 2100)  BP: 107/72 (01/31/17 2100)  SpO2: 98 % (02/01/17 0300) Vital Signs (24h Range):  Temp:  [97.5 °F (36.4 °C)-98.9 °F (37.2 °C)] 98.9 °F (37.2 °C)  Pulse:  [] 97  Resp:  [16] 16  SpO2:  [96 %-98 %] 98 %  BP: (107-122)/(56-78) 107/72     Weight: 91.7 kg (202 lb 2.6 oz)  Body mass index is 28.2 kg/(m^2).    Physical Exam   Constitutional: He is oriented to person, place, and time. He appears well-developed and well-nourished. No distress.   Sleeping soundly after receiving pain medicine.  Awakens easily    HENT:   Head: Normocephalic and atraumatic.   Mouth/Throat: Oropharynx is clear and moist. No oropharyngeal exudate.   Eyes: EOM are normal. Pupils are equal, round, and reactive to light.   Neck: Normal range of motion. Neck supple. No thyromegaly present.   Cardiovascular: Normal rate and regular rhythm.    No murmur heard.  Pulmonary/Chest: Effort normal and breath sounds normal. No respiratory distress. He has no wheezes. He exhibits no tenderness.   Abdominal: Soft. Bowel sounds are normal. He exhibits distension. There is no tenderness. There is no  rebound and no guarding.   Questionable ascites    Musculoskeletal: He exhibits no edema.   Neurological: He is alert and oriented to person, place, and time.   Skin: Skin is warm and dry. No rash noted. He is not diaphoretic.   Psychiatric: He has a normal mood and affect. Thought content normal.   Nursing note and vitals reviewed.       Significant Labs:   CBC:   Recent Labs  Lab 01/31/17  0950   WBC 7.50   HGB 15.2   HCT 45.5   *     CMP:   Recent Labs  Lab 01/31/17  0953   *   K 3.8      CO2 21*      BUN 9   CREATININE 0.8   CALCIUM 8.9   PROT 6.9   ALBUMIN 2.7*   BILITOT 2.2*   ALKPHOS 303*   AST 46*   ALT 19   ANIONGAP 12   EGFRNONAA >60     Coagulation:   Recent Labs  Lab 01/31/17  1707   INR 1.8*       Significant Imaging:     MRI MRCP without contrast 1/31/2017  1. Right hepatic lobe mass, with apparent interval enlargement. Associated biliary obstruction has mildly worsened. Occlusion of the right portal vein is also present as before, with evidence of portal hypertension including splenomegaly and new, small volume ascites.  2. Moderate gallbladder wall edema. This is most finding as is commonly seen in the setting of ascites.  3. Enlarged peripancreatic lymph node, mildly increased in size.      Assessment/Plan:     * Biliary obstruction due to cancer  Abnormal liver enzymes  Elevated bilirubin  MRI MRCP as above.  Elevated alk phos and t bili.  Consulting GI for consideration of ERCP.        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 Calzada.      HTN (hypertension), benign  BP ok since arrival here.  Resume home lisinopril 20 mg with parameters. Monitor.       Chemotherapy-induced thrombocytopenia  Plt 136 today, improving with outpatient transfusion of platelets.  On warfarin for h/o portal vein thrombosis.  No additional anticoagulation.  No visible bleeding.  Monitor.         Anxiety  Pt calm at present.  Takes  alprazolam 0.25 mg TID prn which will be resumed here.        VTE Risk Mitigation         Ordered     Medium Risk of VTE  Once      01/31/17 2034     Place sequential compression device  Until discontinued      01/31/17 2034     Place RENATA hose  Until discontinued      01/31/17 2034        Kylee Carbajal PA-C  Department of Hospital Medicine   [facility]  Pager: [phone]    Attending: Noam Hansen MD

## 2017-02-01 NOTE — ASSESSMENT & PLAN NOTE
Plt 136 today, improving with outpatient transfusion of platelets.  On warfarin for h/o portal vein thrombosis.  No additional anticoagulation.  No visible bleeding.  Monitor.

## 2017-02-01 NOTE — PLAN OF CARE
Problem: Patient Care Overview  Goal: Plan of Care Review  Outcome: Ongoing (interventions implemented as appropriate)  Pt slept overnight no complaints. Vitals stable.

## 2017-02-01 NOTE — ASSESSMENT & PLAN NOTE
Abnormal liver enzymes  Elevated bilirubin  MRI MRCP as above.  Elevated alk phos and t bili.  Consulting GI for consideration of ERCP.

## 2017-02-01 NOTE — CONSULTS
Gastroenterology  CC: Weakness    HPI 56 y.o. male with a past medical history of colon cancer in 2009 status post resection with ileocolonic anastomosis presents as a transfer from an outside hospital for evaluation for ERCP.  Patient presented noting weakness with elevations in his liver function tests.  His history of colon cancer in 2009 status post surgical resection with subsequent colonoscopy in 2013 and 2014 noting diverticulosis with a patent anastomosis.  He was noted to have a mass in the liver which was biopsied noting adenocarcinoma, suspected by oncology to be recurrence.  He has received chemotherapy as well as some weakness.  Weakness is described as diffuse lethargy.  He denies any vomiting but only has the ability to eat small amounts with early satiety.  Denies any chest pain or shortness of breath.  His urine is somewhat darker.  No other exacerbating or relieving factors or other associated symptoms are present.  It is mild to moderate in intensity.  Elevation in his liver function tests is with a bilirubin which increased to 2.2 from normal elevation in his abdomen phosphatase as well.  An MRCP was done noting increased compression of the right hepatic duct with compression.        Past Medical History   Diagnosis Date    Arthritis      sacroilitis    Cancer 1999     colon cancer    Cataract     Colon cancer     Degenerative disc disease     Foot pain     GERD (gastroesophageal reflux disease)     Hypertension      The following portions of the patient's history were reviewed and updated as appropriate: allergies, current medications, past family history, past medical history, past social history, past surgical history and problem list.    Past Surgical History   Procedure Laterality Date    Colon surgery      Cataract extraction Bilateral        Social History  Social History   Substance Use Topics    Smoking status: Former Smoker     Packs/day: 0.50     Years: 2.00    Smokeless  "tobacco: Former User      Comment: smokless tobacco when a teenager     Alcohol use No         Family History   Problem Relation Age of Onset    Cancer Mother      ovarian    Cancer Father      bladder    COPD Brother     Cancer Brother      lung    Diabetes Brother     Cancer Paternal Grandfather      All: NKDA  Meds: List reviewed including coumadin    Review of Systems  General ROS: negative for chills, fever or weight loss; +weakness  Psychological ROS: negative for hallucination, depression or suicidal ideation  Ophthalmic ROS: negative for blurry vision, photophobia or eye pain  ENT ROS: negative for epistaxis, sore throat or rhinorrhea  Respiratory ROS: no cough, shortness of breath, or wheezing  Cardiovascular ROS: no chest pain or dyspnea on exertion  Gastrointestinal ROS: no abdominal pain, change in bowel habits, or black/ bloody stools  Genito-Urinary ROS: no dysuria, trouble voiding, or hematuria  Musculoskeletal ROS: negative for gait disturbance; +muscular weakness  Neurological ROS: no syncope or seizures; no ataxia  Dermatological ROS: negative for pruritis, rash and jaundice    Physical Examination  Visit Vitals    /60 (BP Location: Right arm, Patient Position: Lying, BP Method: Automatic)    Pulse 89    Temp 96.5 °F (35.8 °C) (Oral)    Resp 20    Ht 5' 11" (1.803 m)    Wt 91.7 kg (202 lb 2.6 oz)    SpO2 98%    BMI 28.2 kg/m2     General appearance: alert, cooperative, no distress  HENT: Normocephalic, atraumatic, neck symmetrical, no nasal discharge   Eyes: conjunctivae/corneas clear, PERRL, EOM's intact  Lungs: clear to auscultation bilaterally, no dullness to percussion bilaterally  Heart: regular rate and rhythm without rub; no displacement of the PMI   Abdomen: soft, non-tender; bowel sounds normoactive; no organomegaly  Extremities: extremities symmetric; no clubbing, cyanosis, or edema  Integument: Skin color, texture, turgor normal; no rashes; hair distrubution " normal  Neurologic: Alert and oriented X 3, normal strength, normal coordination and gait  Psychiatric: no pressured speech; normal affect; no evidence of impaired cognition     Labs: Tb 1.6    Imaging: MRCP images/report reviewed, biliary obstruction    Assessment/Plan: 57yo male with:    1. Elevated LFTs/Biliary obstruction/Weakness   - d/w Dr. Hansen   - Vit K given, will check so INR improves   - plan for ERCP with possible stent placement    - risk factors: metastatic cancer, htn, chronic pain   - monitor LFTs   - ok for enteral nutrition today

## 2017-02-02 ENCOUNTER — SURGERY (OUTPATIENT)
Age: 57
End: 2017-02-02

## 2017-02-02 LAB
ALBUMIN SERPL BCP-MCNC: 2.1 G/DL
ALP SERPL-CCNC: 211 U/L
ALT SERPL W/O P-5'-P-CCNC: 11 U/L
ANION GAP SERPL CALC-SCNC: 9 MMOL/L
APTT BLDCRRT: 38 SEC
AST SERPL-CCNC: 34 U/L
BASOPHILS # BLD AUTO: 0.02 K/UL
BASOPHILS NFR BLD: 0.3 %
BILIRUB DIRECT SERPL-MCNC: 1 MG/DL
BILIRUB SERPL-MCNC: 1.8 MG/DL
BUN SERPL-MCNC: 10 MG/DL
CALCIUM SERPL-MCNC: 8 MG/DL
CHLORIDE SERPL-SCNC: 102 MMOL/L
CO2 SERPL-SCNC: 21 MMOL/L
CREAT SERPL-MCNC: 0.6 MG/DL
DIFFERENTIAL METHOD: ABNORMAL
EOSINOPHIL # BLD AUTO: 0.1 K/UL
EOSINOPHIL NFR BLD: 0.8 %
ERYTHROCYTE [DISTWIDTH] IN BLOOD BY AUTOMATED COUNT: 15.9 %
EST. GFR  (AFRICAN AMERICAN): >60 ML/MIN/1.73 M^2
EST. GFR  (NON AFRICAN AMERICAN): >60 ML/MIN/1.73 M^2
GLUCOSE SERPL-MCNC: 77 MG/DL
HCT VFR BLD AUTO: 37 %
HGB BLD-MCNC: 12.4 G/DL
INR PPP: 2.2
INR PPP: 2.3
LYMPHOCYTES # BLD AUTO: 1.2 K/UL
LYMPHOCYTES NFR BLD: 20.1 %
MAGNESIUM SERPL-MCNC: 1.9 MG/DL
MCH RBC QN AUTO: 32.8 PG
MCHC RBC AUTO-ENTMCNC: 33.5 %
MCV RBC AUTO: 98 FL
MONOCYTES # BLD AUTO: 1.1 K/UL
MONOCYTES NFR BLD: 18.3 %
NEUTROPHILS # BLD AUTO: 3.6 K/UL
NEUTROPHILS NFR BLD: 60.3 %
PHOSPHATE SERPL-MCNC: 2.3 MG/DL
PLATELET # BLD AUTO: 104 K/UL
PMV BLD AUTO: 9.6 FL
POTASSIUM SERPL-SCNC: 3.5 MMOL/L
PROT SERPL-MCNC: 5.5 G/DL
PROTHROMBIN TIME: 22.8 SEC
PROTHROMBIN TIME: 24.1 SEC
RBC # BLD AUTO: 3.78 M/UL
SODIUM SERPL-SCNC: 132 MMOL/L
WBC # BLD AUTO: 6.02 K/UL

## 2017-02-02 PROCEDURE — 25000003 PHARM REV CODE 250: Performed by: HOSPITALIST

## 2017-02-02 PROCEDURE — 85610 PROTHROMBIN TIME: CPT | Mod: 91

## 2017-02-02 PROCEDURE — 84100 ASSAY OF PHOSPHORUS: CPT

## 2017-02-02 PROCEDURE — 80048 BASIC METABOLIC PNL TOTAL CA: CPT

## 2017-02-02 PROCEDURE — 85610 PROTHROMBIN TIME: CPT

## 2017-02-02 PROCEDURE — 94761 N-INVAS EAR/PLS OXIMETRY MLT: CPT

## 2017-02-02 PROCEDURE — 85730 THROMBOPLASTIN TIME PARTIAL: CPT

## 2017-02-02 PROCEDURE — 25000003 PHARM REV CODE 250: Performed by: INTERNAL MEDICINE

## 2017-02-02 PROCEDURE — 80076 HEPATIC FUNCTION PANEL: CPT

## 2017-02-02 PROCEDURE — 83735 ASSAY OF MAGNESIUM: CPT

## 2017-02-02 PROCEDURE — 85025 COMPLETE CBC W/AUTO DIFF WBC: CPT

## 2017-02-02 PROCEDURE — 25000003 PHARM REV CODE 250: Performed by: PHYSICIAN ASSISTANT

## 2017-02-02 PROCEDURE — 36415 COLL VENOUS BLD VENIPUNCTURE: CPT

## 2017-02-02 PROCEDURE — 11000001 HC ACUTE MED/SURG PRIVATE ROOM

## 2017-02-02 RX ORDER — PHYTONADIONE 5 MG/1
10 TABLET ORAL ONCE
Status: COMPLETED | OUTPATIENT
Start: 2017-02-02 | End: 2017-02-02

## 2017-02-02 RX ADMIN — PHYTONADIONE 10 MG: 5 TABLET ORAL at 02:02

## 2017-02-02 RX ADMIN — AMITRIPTYLINE HYDROCHLORIDE 10 MG: 10 TABLET, FILM COATED ORAL at 09:02

## 2017-02-02 RX ADMIN — SODIUM CHLORIDE: 0.9 INJECTION, SOLUTION INTRAVENOUS at 06:02

## 2017-02-02 RX ADMIN — AMITRIPTYLINE HYDROCHLORIDE 10 MG: 10 TABLET, FILM COATED ORAL at 08:02

## 2017-02-02 RX ADMIN — SODIUM CHLORIDE: 0.9 INJECTION, SOLUTION INTRAVENOUS at 09:02

## 2017-02-02 RX ADMIN — LISINOPRIL 20 MG: 20 TABLET ORAL at 09:02

## 2017-02-02 RX ADMIN — OXYCODONE HYDROCHLORIDE 30 MG: 5 TABLET ORAL at 08:02

## 2017-02-02 RX ADMIN — ONDANSETRON 8 MG: 8 TABLET, ORALLY DISINTEGRATING ORAL at 08:02

## 2017-02-02 RX ADMIN — PANTOPRAZOLE SODIUM 40 MG: 40 TABLET, DELAYED RELEASE ORAL at 09:02

## 2017-02-02 RX ADMIN — OXYCODONE HYDROCHLORIDE 30 MG: 5 TABLET ORAL at 09:02

## 2017-02-02 NOTE — PROGRESS NOTES
[facility] Medicine  Progress Note    Patient Name: Berlin Villa  MRN: [id number]  Patient Class: IP- Inpatient   Admission Date: 1/31/2017  Length of Stay: 1 days  Attending Physician: Noam Hansen MD  Primary Care Provider: Luis M Ruelas MD        Subjective:     Principal Problem:Biliary obstruction due to cancer    HPI:  57 yo WM with metastatic colon cancer(originally diagnosed in 2010) to the liver (May 2016) that is currently undergoing chemotherapy with Dr. Calzada that presented to her office today complaining of increased back/abdominal pain. He was found to have elevation of his TBili to 2.2 from 1 a month prior. He has elevations in his AST and Alk Phos as well. He underwent MRI/MRCP today of his abdomen that showed enlargement of the hepatic mass and evidence of right hepatic duct obstruction. He is being transferred to [facility] from [facility] for evaluation by GI and consideration of ERCP and biliary stenting if needed.    His main complaint is back pain for the past month which he describes as bilateral and constant.  States spending a lot of time in bed because of it.  Denies CP, SOB, abdominal pain, N/V/D.      Denies current smoking. States occasional alcohol.  Denies use of illicit drugs.            Hospital Course:  Coumadin was held and Vit K given, however INR stayed at 2.2.  ERCP delayed.    Interval History:  Coumadin was held and given Vit K, however INR remain 2.2.  ERCP with possible stent placement delayed.     Review of Systems   Constitutional: Positive for fatigue. Negative for chills and fever.   HENT: Negative for congestion.    Respiratory: Negative for shortness of breath and wheezing.    Cardiovascular: Negative for chest pain and palpitations.   Gastrointestinal: Negative for abdominal distention, abdominal pain, nausea and vomiting.   Genitourinary: Negative for urgency.   Musculoskeletal: Positive for back pain. Negative for arthralgias  and myalgias.   Skin: Negative for pallor, rash and wound.     Objective:     Vital Signs (Most Recent):  Temp: 98.3 °F (36.8 °C) (02/02/17 1115)  Pulse: 97 (02/02/17 1115)  Resp: 20 (02/02/17 0715)  BP: (!) 112/57 (02/02/17 1115)  SpO2: (!) 91 % (02/02/17 0800) Vital Signs (24h Range):  Temp:  [96.6 °F (35.9 °C)-98.7 °F (37.1 °C)] 98.3 °F (36.8 °C)  Pulse:  [] 97  Resp:  [16-20] 20  SpO2:  [91 %-94 %] 91 %  BP: ()/(54-68) 112/57     Weight: 91.7 kg (202 lb 2.6 oz)  Body mass index is 28.2 kg/(m^2).  No intake or output data in the 24 hours ending 02/02/17 1542   Physical Exam   Constitutional: He is oriented to person, place, and time. He appears well-developed. No distress.   HENT:   Head: Normocephalic and atraumatic.   Mouth/Throat: Oropharynx is clear and moist. No oropharyngeal exudate.   Eyes: EOM are normal. Pupils are equal, round, and reactive to light.   Neck: Neck supple.   Cardiovascular: Normal rate and regular rhythm.    No murmur heard.  Pulmonary/Chest: Effort normal and breath sounds normal. No respiratory distress. He has no wheezes. He exhibits no tenderness.   Abdominal: Soft. Bowel sounds are normal. He exhibits distension. There is no tenderness.   Questionable ascites    Musculoskeletal: He exhibits no edema.   Neurological: He is alert and oriented to person, place, and time.   Skin: Skin is warm and dry. No rash noted. He is not diaphoretic.   Psychiatric: He has a normal mood and affect. Thought content normal.   Nursing note and vitals reviewed.      Significant Labs:   CBC:   Recent Labs  Lab 02/01/17  0503 02/02/17  0413   WBC 6.01 6.02   HGB 13.2* 12.4*   HCT 39.2* 37.0*   * 104*     CMP:   Recent Labs  Lab 02/01/17  0503 02/02/17  0413   * 132*   K 3.6 3.5    102   CO2 21* 21*   GLU 80 77   BUN 11 10   CREATININE 0.6 0.6   CALCIUM 8.2* 8.0*   PROT 5.9* 5.5*   ALBUMIN 2.3* 2.1*   BILITOT 1.6* 1.8*   ALKPHOS 237* 211*   AST 36 34   ALT 15 11   ANIONGAP 9 9    EGFRNONAA >60 >60     Coagulation:   Recent Labs  Lab 02/02/17  0413   INR 2.2*   APTT 38.0*       Significant Imaging:  No New    Assessment/Plan:      * Biliary obstruction due to cancer  Abnormal liver enzymes  Elevated bilirubin  MRI MRCP as above.  Elevated alk phos and t bili.   Dr. Abel GI Consult:   Elevated LFTs/Biliary obstruction/Weakness  - Vit K given, will check so INR improves  - plan for ERCP with possible stent placement when INR acceptable  - risk factors: metastatic cancer, htn, chronic pain  - monitor LFTs  - ok for enteral nutrition today      HTN (hypertension), benign  -122.  Resume home lisinopril 20 mg with parameters. Monitor.       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 Calzada.      Anxiety  Pt calm at present.  Takes alprazolam 0.25 mg TID prn which will be resumed here.        Chemotherapy-induced thrombocytopenia  Plt down to 104 from 136 today.  No outpatient transfusion of platelets.  On warfarin for h/o portal vein thrombosis.  No additional anticoagulation.  No visible bleeding. Holding. Vit K. Yesterday.  INR remains 2.2.   Monitor.         VTE Risk Mitigation         Ordered     Medium Risk of VTE  Once      01/31/17 2034     Place sequential compression device  Until discontinued      01/31/17 2034     Place RENATA hose  Until discontinued      01/31/17 2034          Zara Garcia NP  Department of Hospital Medicine   [facility]

## 2017-02-02 NOTE — PLAN OF CARE
Problem: Patient Care Overview  Goal: Plan of Care Review  Outcome: Ongoing (interventions implemented as appropriate)  Pt given pain medication once this shift. Pt will be NPO past midnight for ERCP 2/2. Will monitor and intervene.

## 2017-02-02 NOTE — PLAN OF CARE
Problem: Infection, Risk/Actual (Adult)  Goal: Identify Related Risk Factors and Signs and Symptoms  Related risk factors and signs and symptoms are identified upon initiation of Human Response Clinical Practice Guideline (CPG)   Outcome: Ongoing (interventions implemented as appropriate)  Patient AAOx3. Instructed on NPO status. NS @100cc hour. No c/o pain during shift. Call light within reach.

## 2017-02-02 NOTE — PROGRESS NOTES
.Pharmacy New Medication Education    Patient accepted medication education.    Pharmacy educated patient on the following medications, using the teach-back method.   Xanax  Amitriptyline  Dilaudid  Lisinopril  Zofran  Oxy ir  Pantoprazole  phenergan    Learners of pharmacy medication education included:  patient    Patient +/- learner response:  verbalize understanding

## 2017-02-02 NOTE — ASSESSMENT & PLAN NOTE
Plt down to 104 from 136 today.  No outpatient transfusion of platelets.  On warfarin for h/o portal vein thrombosis.  No additional anticoagulation.  No visible bleeding. Holding. Vit K. Yesterday.  INR remains 2.2.   Monitor.

## 2017-02-02 NOTE — SUBJECTIVE & OBJECTIVE
Interval History:  Coumadin was held and given Vit K, however INR remain 2.2.  ERCP with possible stent placement delayed.     Review of Systems   Constitutional: Positive for fatigue. Negative for chills and fever.   HENT: Negative for congestion.    Respiratory: Negative for shortness of breath and wheezing.    Cardiovascular: Negative for chest pain and palpitations.   Gastrointestinal: Negative for abdominal distention, abdominal pain, nausea and vomiting.   Genitourinary: Negative for urgency.   Musculoskeletal: Positive for back pain. Negative for arthralgias and myalgias.   Skin: Negative for pallor, rash and wound.     Objective:     Vital Signs (Most Recent):  Temp: 98.3 °F (36.8 °C) (02/02/17 1115)  Pulse: 97 (02/02/17 1115)  Resp: 20 (02/02/17 0715)  BP: (!) 112/57 (02/02/17 1115)  SpO2: (!) 91 % (02/02/17 0800) Vital Signs (24h Range):  Temp:  [96.6 °F (35.9 °C)-98.7 °F (37.1 °C)] 98.3 °F (36.8 °C)  Pulse:  [] 97  Resp:  [16-20] 20  SpO2:  [91 %-94 %] 91 %  BP: ()/(54-68) 112/57     Weight: 91.7 kg (202 lb 2.6 oz)  Body mass index is 28.2 kg/(m^2).  No intake or output data in the 24 hours ending 02/02/17 1542   Physical Exam   Constitutional: He is oriented to person, place, and time. He appears well-developed. No distress.   HENT:   Head: Normocephalic and atraumatic.   Mouth/Throat: Oropharynx is clear and moist. No oropharyngeal exudate.   Eyes: EOM are normal. Pupils are equal, round, and reactive to light.   Neck: Neck supple.   Cardiovascular: Normal rate and regular rhythm.    No murmur heard.  Pulmonary/Chest: Effort normal and breath sounds normal. No respiratory distress. He has no wheezes. He exhibits no tenderness.   Abdominal: Soft. Bowel sounds are normal. He exhibits distension. There is no tenderness.   Questionable ascites    Musculoskeletal: He exhibits no edema.   Neurological: He is alert and oriented to person, place, and time.   Skin: Skin is warm and dry. No rash  noted. He is not diaphoretic.   Psychiatric: He has a normal mood and affect. Thought content normal.   Nursing note and vitals reviewed.      Significant Labs:   CBC:   Recent Labs  Lab 02/01/17 0503 02/02/17 0413   WBC 6.01 6.02   HGB 13.2* 12.4*   HCT 39.2* 37.0*   * 104*     CMP:   Recent Labs  Lab 02/01/17 0503 02/02/17 0413   * 132*   K 3.6 3.5    102   CO2 21* 21*   GLU 80 77   BUN 11 10   CREATININE 0.6 0.6   CALCIUM 8.2* 8.0*   PROT 5.9* 5.5*   ALBUMIN 2.3* 2.1*   BILITOT 1.6* 1.8*   ALKPHOS 237* 211*   AST 36 34   ALT 15 11   ANIONGAP 9 9   EGFRNONAA >60 >60     Coagulation:   Recent Labs  Lab 02/02/17 0413   INR 2.2*   APTT 38.0*       Significant Imaging:  No New

## 2017-02-02 NOTE — ASSESSMENT & PLAN NOTE
Abnormal liver enzymes  Elevated bilirubin  MRI MRCP as above.  Elevated alk phos and t bili.   Dr. Abel GI Consult:   Elevated LFTs/Biliary obstruction/Weakness  - Vit K given, will check so INR improves  - plan for ERCP with possible stent placement when INR acceptable  - risk factors: metastatic cancer, htn, chronic pain  - monitor LFTs  - ok for enteral nutrition today

## 2017-02-02 NOTE — PLAN OF CARE
Problem: Patient Care Overview  Goal: Plan of Care Review  Outcome: Ongoing (interventions implemented as appropriate)  Plan of care reviewed with patient. Patient verbalized complete understanding. Fall precautions maintained. Bed in lowest position, locked, call light within reach, and bed alarm on. Side rails up x's 2 with slip resistant socks on. Nurse instructed patient to notify staff for any assistance and pt verbalized complete understanding.

## 2017-02-03 ENCOUNTER — ANESTHESIA (OUTPATIENT)
Dept: ENDOSCOPY | Facility: HOSPITAL | Age: 57
DRG: 445 | End: 2017-02-03
Payer: MEDICAID

## 2017-02-03 ENCOUNTER — SURGERY (OUTPATIENT)
Age: 57
End: 2017-02-03

## 2017-02-03 ENCOUNTER — ANESTHESIA EVENT (OUTPATIENT)
Dept: ENDOSCOPY | Facility: HOSPITAL | Age: 57
DRG: 445 | End: 2017-02-03
Payer: MEDICAID

## 2017-02-03 LAB
ABO + RH BLD: NORMAL
ALBUMIN SERPL BCP-MCNC: 2 G/DL
ALP SERPL-CCNC: 204 U/L
ALT SERPL W/O P-5'-P-CCNC: 10 U/L
ANION GAP SERPL CALC-SCNC: 7 MMOL/L
AST SERPL-CCNC: 31 U/L
BASOPHILS # BLD AUTO: 0.01 K/UL
BASOPHILS NFR BLD: 0.2 %
BILIRUB DIRECT SERPL-MCNC: 1.2 MG/DL
BILIRUB SERPL-MCNC: 2 MG/DL
BLD GP AB SCN CELLS X3 SERPL QL: NORMAL
BUN SERPL-MCNC: 8 MG/DL
CALCIUM SERPL-MCNC: 7.8 MG/DL
CHLORIDE SERPL-SCNC: 103 MMOL/L
CO2 SERPL-SCNC: 23 MMOL/L
CREAT SERPL-MCNC: 0.7 MG/DL
DIFFERENTIAL METHOD: ABNORMAL
EOSINOPHIL # BLD AUTO: 0 K/UL
EOSINOPHIL NFR BLD: 0.8 %
ERYTHROCYTE [DISTWIDTH] IN BLOOD BY AUTOMATED COUNT: 15.7 %
EST. GFR  (AFRICAN AMERICAN): >60 ML/MIN/1.73 M^2
EST. GFR  (NON AFRICAN AMERICAN): >60 ML/MIN/1.73 M^2
GLUCOSE SERPL-MCNC: 85 MG/DL
HCT VFR BLD AUTO: 36.6 %
HGB BLD-MCNC: 12.1 G/DL
INR PPP: 1.4
LYMPHOCYTES # BLD AUTO: 1.3 K/UL
LYMPHOCYTES NFR BLD: 25.8 %
MAGNESIUM SERPL-MCNC: 1.7 MG/DL
MCH RBC QN AUTO: 32.3 PG
MCHC RBC AUTO-ENTMCNC: 33.1 %
MCV RBC AUTO: 98 FL
MONOCYTES # BLD AUTO: 0.8 K/UL
MONOCYTES NFR BLD: 15.2 %
NEUTROPHILS # BLD AUTO: 3 K/UL
NEUTROPHILS NFR BLD: 57.8 %
PHOSPHATE SERPL-MCNC: 2.4 MG/DL
PLATELET # BLD AUTO: 90 K/UL
PMV BLD AUTO: 9.7 FL
POTASSIUM SERPL-SCNC: 4.1 MMOL/L
PROT SERPL-MCNC: 5.2 G/DL
PROTHROMBIN TIME: 14.5 SEC
RBC # BLD AUTO: 3.75 M/UL
SODIUM SERPL-SCNC: 133 MMOL/L
WBC # BLD AUTO: 5.12 K/UL

## 2017-02-03 PROCEDURE — 25000003 PHARM REV CODE 250: Performed by: NURSE ANESTHETIST, CERTIFIED REGISTERED

## 2017-02-03 PROCEDURE — 84100 ASSAY OF PHOSPHORUS: CPT

## 2017-02-03 PROCEDURE — 83735 ASSAY OF MAGNESIUM: CPT

## 2017-02-03 PROCEDURE — 43274 ERCP DUCT STENT PLACEMENT: CPT | Performed by: INTERNAL MEDICINE

## 2017-02-03 PROCEDURE — 25000003 PHARM REV CODE 250: Performed by: HOSPITALIST

## 2017-02-03 PROCEDURE — 80076 HEPATIC FUNCTION PANEL: CPT

## 2017-02-03 PROCEDURE — 36415 COLL VENOUS BLD VENIPUNCTURE: CPT

## 2017-02-03 PROCEDURE — 25000003 PHARM REV CODE 250: Performed by: PHYSICIAN ASSISTANT

## 2017-02-03 PROCEDURE — 85025 COMPLETE CBC W/AUTO DIFF WBC: CPT

## 2017-02-03 PROCEDURE — 86850 RBC ANTIBODY SCREEN: CPT

## 2017-02-03 PROCEDURE — 11000001 HC ACUTE MED/SURG PRIVATE ROOM

## 2017-02-03 PROCEDURE — 86900 BLOOD TYPING SEROLOGIC ABO: CPT

## 2017-02-03 PROCEDURE — 94761 N-INVAS EAR/PLS OXIMETRY MLT: CPT

## 2017-02-03 PROCEDURE — 85610 PROTHROMBIN TIME: CPT

## 2017-02-03 PROCEDURE — C1769 GUIDE WIRE: HCPCS | Performed by: INTERNAL MEDICINE

## 2017-02-03 PROCEDURE — 37000008 HC ANESTHESIA 1ST 15 MINUTES: Performed by: INTERNAL MEDICINE

## 2017-02-03 PROCEDURE — 80048 BASIC METABOLIC PNL TOTAL CA: CPT

## 2017-02-03 PROCEDURE — 0F798DZ DILATION OF COMMON BILE DUCT WITH INTRALUMINAL DEVICE, VIA NATURAL OR ARTIFICIAL OPENING ENDOSCOPIC: ICD-10-PCS | Performed by: INTERNAL MEDICINE

## 2017-02-03 PROCEDURE — 25000003 PHARM REV CODE 250: Performed by: NURSE PRACTITIONER

## 2017-02-03 PROCEDURE — 27200949 HC CANNULATOME: Performed by: INTERNAL MEDICINE

## 2017-02-03 PROCEDURE — 63600175 PHARM REV CODE 636 W HCPCS: Performed by: NURSE ANESTHETIST, CERTIFIED REGISTERED

## 2017-02-03 PROCEDURE — 37000009 HC ANESTHESIA EA ADD 15 MINS: Performed by: INTERNAL MEDICINE

## 2017-02-03 PROCEDURE — C2617 STENT, NON-COR, TEM W/O DEL: HCPCS | Performed by: INTERNAL MEDICINE

## 2017-02-03 PROCEDURE — 63600175 PHARM REV CODE 636 W HCPCS: Performed by: HOSPITALIST

## 2017-02-03 DEVICE — IMPLANTABLE DEVICE: Type: IMPLANTABLE DEVICE | Site: BILE DUCT | Status: FUNCTIONAL

## 2017-02-03 RX ORDER — MIDAZOLAM HYDROCHLORIDE 1 MG/ML
INJECTION, SOLUTION INTRAMUSCULAR; INTRAVENOUS
Status: DISCONTINUED | OUTPATIENT
Start: 2017-02-03 | End: 2017-02-03

## 2017-02-03 RX ORDER — CIPROFLOXACIN 2 MG/ML
INJECTION, SOLUTION INTRAVENOUS
Status: DISCONTINUED | OUTPATIENT
Start: 2017-02-03 | End: 2017-02-03

## 2017-02-03 RX ORDER — LIDOCAINE HCL/PF 100 MG/5ML
SYRINGE (ML) INTRAVENOUS
Status: DISCONTINUED | OUTPATIENT
Start: 2017-02-03 | End: 2017-02-03

## 2017-02-03 RX ORDER — SUCCINYLCHOLINE CHLORIDE 20 MG/ML
INJECTION INTRAMUSCULAR; INTRAVENOUS
Status: DISCONTINUED | OUTPATIENT
Start: 2017-02-03 | End: 2017-02-03

## 2017-02-03 RX ORDER — SENNOSIDES 8.6 MG/1
8.6 TABLET ORAL DAILY PRN
Status: DISCONTINUED | OUTPATIENT
Start: 2017-02-03 | End: 2017-02-04 | Stop reason: HOSPADM

## 2017-02-03 RX ORDER — ONDANSETRON 2 MG/ML
4 INJECTION INTRAMUSCULAR; INTRAVENOUS DAILY PRN
Status: DISCONTINUED | OUTPATIENT
Start: 2017-02-03 | End: 2017-02-04 | Stop reason: HOSPADM

## 2017-02-03 RX ORDER — SODIUM CHLORIDE 0.9 % (FLUSH) 0.9 %
3 SYRINGE (ML) INJECTION
Status: DISCONTINUED | OUTPATIENT
Start: 2017-02-03 | End: 2017-02-04 | Stop reason: HOSPADM

## 2017-02-03 RX ORDER — FENTANYL CITRATE 50 UG/ML
INJECTION, SOLUTION INTRAMUSCULAR; INTRAVENOUS
Status: DISCONTINUED | OUTPATIENT
Start: 2017-02-03 | End: 2017-02-03

## 2017-02-03 RX ORDER — PROPOFOL 10 MG/ML
VIAL (ML) INTRAVENOUS
Status: DISCONTINUED | OUTPATIENT
Start: 2017-02-03 | End: 2017-02-03

## 2017-02-03 RX ORDER — SODIUM CHLORIDE 9 MG/ML
INJECTION, SOLUTION INTRAVENOUS CONTINUOUS PRN
Status: DISCONTINUED | OUTPATIENT
Start: 2017-02-03 | End: 2017-02-03

## 2017-02-03 RX ORDER — HYDROMORPHONE HYDROCHLORIDE 2 MG/ML
0.4 INJECTION, SOLUTION INTRAMUSCULAR; INTRAVENOUS; SUBCUTANEOUS EVERY 5 MIN PRN
Status: DISCONTINUED | OUTPATIENT
Start: 2017-02-03 | End: 2017-02-04 | Stop reason: HOSPADM

## 2017-02-03 RX ADMIN — SODIUM CHLORIDE: 0.9 INJECTION, SOLUTION INTRAVENOUS at 12:02

## 2017-02-03 RX ADMIN — MIDAZOLAM 2 MG: 1 INJECTION INTRAMUSCULAR; INTRAVENOUS at 12:02

## 2017-02-03 RX ADMIN — PROMETHAZINE HYDROCHLORIDE 6.25 MG: 25 INJECTION INTRAMUSCULAR; INTRAVENOUS at 01:02

## 2017-02-03 RX ADMIN — AMITRIPTYLINE HYDROCHLORIDE 10 MG: 10 TABLET, FILM COATED ORAL at 08:02

## 2017-02-03 RX ADMIN — LISINOPRIL 20 MG: 20 TABLET ORAL at 08:02

## 2017-02-03 RX ADMIN — SODIUM CHLORIDE 1000 ML: 0.9 INJECTION, SOLUTION INTRAVENOUS at 06:02

## 2017-02-03 RX ADMIN — CIPROFLOXACIN 400 MG: 2 INJECTION, SOLUTION INTRAVENOUS at 01:02

## 2017-02-03 RX ADMIN — SENNOSIDES 8.6 MG: 8.6 TABLET ORAL at 11:02

## 2017-02-03 RX ADMIN — PANTOPRAZOLE SODIUM 40 MG: 40 TABLET, DELAYED RELEASE ORAL at 08:02

## 2017-02-03 RX ADMIN — OXYCODONE HYDROCHLORIDE 30 MG: 5 TABLET ORAL at 05:02

## 2017-02-03 RX ADMIN — FENTANYL CITRATE 100 MCG: 50 INJECTION, SOLUTION INTRAMUSCULAR; INTRAVENOUS at 12:02

## 2017-02-03 RX ADMIN — SUCCINYLCHOLINE CHLORIDE 120 MG: 20 INJECTION, SOLUTION INTRAMUSCULAR; INTRAVENOUS at 12:02

## 2017-02-03 RX ADMIN — PROPOFOL 140 MG: 10 INJECTION, EMULSION INTRAVENOUS at 12:02

## 2017-02-03 RX ADMIN — LIDOCAINE HYDROCHLORIDE 100 MG: 20 INJECTION, SOLUTION INTRAVENOUS at 12:02

## 2017-02-03 NOTE — SUBJECTIVE & OBJECTIVE
Interval History:   INR down to 1.4 today. Patient is NPO awaiting ERCP.  Still having some back pain.     Review of Systems   Constitutional: Negative for chills and fever.   HENT: Negative for congestion.    Respiratory: Negative for shortness of breath and wheezing.    Cardiovascular: Negative for chest pain and palpitations.   Gastrointestinal: Negative for abdominal distention, abdominal pain, nausea and vomiting.   Genitourinary: Negative for urgency.   Musculoskeletal: Positive for back pain. Negative for arthralgias and myalgias.   Skin: Negative for pallor, rash and wound.     Objective:     Vital Signs (Most Recent):  Temp: 98.3 °F (36.8 °C) (02/03/17 0729)  Pulse: 108 (02/03/17 0729)  Resp: 20 (02/03/17 0729)  BP: 116/72 (02/03/17 0729)  SpO2: 97 % (02/03/17 0851) Vital Signs (24h Range):  Temp:  [97.6 °F (36.4 °C)-98.9 °F (37.2 °C)] 98.3 °F (36.8 °C)  Pulse:  [] 108  Resp:  [20] 20  SpO2:  [93 %-97 %] 97 %  BP: ()/(55-72) 116/72     Weight: 91.7 kg (202 lb 2.6 oz)  Body mass index is 28.2 kg/(m^2).    Intake/Output Summary (Last 24 hours) at 02/03/17 1141  Last data filed at 02/03/17 0829   Gross per 24 hour   Intake              620 ml   Output                0 ml   Net              620 ml      Physical Exam   Constitutional: He is oriented to person, place, and time. He appears well-developed. No distress.   HENT:   Head: Normocephalic and atraumatic.   Mouth/Throat: Oropharynx is clear and moist. No oropharyngeal exudate.   Eyes: EOM are normal. Pupils are equal, round, and reactive to light.   Neck: Neck supple.   Cardiovascular: Normal rate and regular rhythm.    No murmur heard.  Pulmonary/Chest: Effort normal and breath sounds normal. No respiratory distress. He has no wheezes. He exhibits no tenderness.   Abdominal: Soft. Bowel sounds are normal. There is no tenderness.   Musculoskeletal: He exhibits no edema.   Neurological: He is alert and oriented to person, place, and time.    Skin: Skin is warm and dry. No rash noted. He is not diaphoretic.   Psychiatric: He has a normal mood and affect. Thought content normal.   Nursing note and vitals reviewed.      Significant Labs:   CBC:   Recent Labs  Lab 02/02/17 0413 02/03/17  0558   WBC 6.02 5.12   HGB 12.4* 12.1*   HCT 37.0* 36.6*   * 90*     CMP:   Recent Labs  Lab 02/02/17 0413 02/03/17 0558   * 133*   K 3.5 4.1    103   CO2 21* 23   GLU 77 85   BUN 10 8   CREATININE 0.6 0.7   CALCIUM 8.0* 7.8*   PROT 5.5* 5.2*   ALBUMIN 2.1* 2.0*   BILITOT 1.8* 2.0*   ALKPHOS 211* 204*   AST 34 31   ALT 11 10   ANIONGAP 9 7*   EGFRNONAA >60 >60     Coagulation:   Recent Labs  Lab 02/02/17 0413 02/03/17 0558   INR 2.2*  < > 1.4*   APTT 38.0*  --   --    < > = values in this interval not displayed.    Significant Imaging:  No new

## 2017-02-03 NOTE — PLAN OF CARE
Problem: Patient Care Overview  Goal: Plan of Care Review  Outcome: Ongoing (interventions implemented as appropriate)  No issues in recovery, vss, nausea resolved

## 2017-02-03 NOTE — ASSESSMENT & PLAN NOTE
Abnormal liver enzymes  Elevated bilirubin  MRI MRCP as above.  Elevated alk phos and t bili., remaining stable.  Dr. Abel GI Consult:   Elevated LFTs/Biliary obstruction/Weakness  - Vit K times 2  INR  Down to 1.4  - ERCP with possible stent placement today  - risk factors: metastatic cancer, htn, chronic pain  - monitor LFTs

## 2017-02-03 NOTE — TRANSFER OF CARE
"Anesthesia Transfer of Care Note    Patient: Berlin Villa    Procedure(s) Performed: Procedure(s) (LRB):  ERCP (N/A)    Patient location: PACU    Anesthesia Type: general    Transport from OR: Transported from OR on 6-10 L/min O2 by face mask with adequate spontaneous ventilation    Post pain: adequate analgesia    Post assessment: no apparent anesthetic complications    Post vital signs: stable    Level of consciousness: awake    Nausea/Vomiting: vomiting    Complications: none          Last vitals:   Visit Vitals    /63    Pulse (!) 119    Temp 36.6 °C (97.9 °F)    Resp 15    Ht 5' 11" (1.803 m)    Wt 91.7 kg (202 lb 2.6 oz)    SpO2 95%    BMI 28.2 kg/m2     "

## 2017-02-03 NOTE — PHARMACY MED REC
"MedMined Medication Reconciliation  Template    Patient was admitted on 1/31/2017 for Biliary obstruction due to cancer.      Patient's prior to admission medication regimen was as follows:  Prescriptions Prior to Admission   Medication Sig Dispense Refill Last Dose    amitriptyline (ELAVIL) 10 MG tablet Take 1 tablet (10 mg total) by mouth 2 (two) times daily. 60 tablet 1 1/31/2017 at Unknown time    lisinopril (PRINIVIL,ZESTRIL) 20 MG tablet Take 1 tablet (20 mg total) by mouth once daily. 30 tablet 6     ondansetron (ZOFRAN) 8 MG tablet Take 1 tablet (8 mg total) by mouth every 12 (twelve) hours as needed for Nausea. 30 tablet 2 1/31/2017 at Unknown time    oxycodone (ROXICODONE) 30 MG Tab Take 1 tablet (30 mg total) by mouth 4 (four) times daily. (Patient taking differently: Take 30 mg by mouth every 6 (six) hours as needed (pain). ) 120 tablet 0 Taking    pantoprazole (PROTONIX) 20 MG tablet Take 20 mg by mouth once daily.  3 1/31/2017 at Unknown time    prochlorperazine (COMPAZINE) 10 MG tablet Take 1 tablet (10 mg total) by mouth every 6 (six) hours as needed. 30 tablet 1 Taking    warfarin (COUMADIN) 2 MG tablet Take 1 tablet (2 mg total) by mouth Daily. 30 tablet 1 1/31/2017 at Unknown time    alprazolam (XANAX) 0.25 MG tablet Take 1 tablet (0.25 mg total) by mouth 3 (three) times daily as needed for Anxiety. 90 tablet 0          Please add appropriate    SmartPhrase below:  Admission Medication Reconciliation - Pharmacy Consult Note    The home medication history was taken by Kizzy Liz CPHT.  Based on information gathered and subsequent review by the clinical pharmacist, the items below may need attention.    You may go to "Admission" then "Reconcile Home Medications" tabs to review and/or act upon these items.        No issues noted with the medication reconciliation.        Potential issues to be addressed PRIOR TO DISCHARGE  o None    Please address this information as you see fit.  " Feel free to contact us if you have any questions or require assistance.    Kraig Scott  [phone]

## 2017-02-03 NOTE — PROGRESS NOTES
[facility] Hospital Medicine  Progress Note    Patient Name: Berlin Villa  MRN: [id number]  Patient Class: IP- Inpatient   Admission Date: 1/31/2017  Length of Stay: 2 days  Attending Physician: Noam Hansen MD  Primary Care Provider: Luis M Ruelas MD        Subjective:     Principal Problem:Biliary obstruction due to cancer    HPI:  57 yo WM with metastatic colon cancer(originally diagnosed in 2010) to the liver (May 2016) that is currently undergoing chemotherapy with Dr. Calzada that presented to her office today complaining of increased back/abdominal pain. He was found to have elevation of his TBili to 2.2 from 1 a month prior. He has elevations in his AST and Alk Phos as well. He underwent MRI/MRCP today of his abdomen that showed enlargement of the hepatic mass and evidence of right hepatic duct obstruction. He is being transferred to [facility] from [facility] for evaluation by GI and consideration of ERCP and biliary stenting if needed.    His main complaint is back pain for the past month which he describes as bilateral and constant.  States spending a lot of time in bed because of it.  Denies CP, SOB, abdominal pain, N/V/D.      Denies current smoking. States occasional alcohol.  Denies use of illicit drugs.            Hospital Course:  Coumadin was held and Vit K daily for 2 days given, ERCP delayed.  INR down to 1.4.      Interval History:   INR down to 1.4 today. Patient is NPO awaiting ERCP.  Still having some back pain.     Review of Systems   Constitutional: Negative for chills and fever.   HENT: Negative for congestion.    Respiratory: Negative for shortness of breath and wheezing.    Cardiovascular: Negative for chest pain and palpitations.   Gastrointestinal: Negative for abdominal distention, abdominal pain, nausea and vomiting.   Genitourinary: Negative for urgency.   Musculoskeletal: Positive for back pain. Negative for arthralgias and myalgias.   Skin: Negative  for pallor, rash and wound.     Objective:     Vital Signs (Most Recent):  Temp: 98.3 °F (36.8 °C) (02/03/17 0729)  Pulse: 108 (02/03/17 0729)  Resp: 20 (02/03/17 0729)  BP: 116/72 (02/03/17 0729)  SpO2: 97 % (02/03/17 0851) Vital Signs (24h Range):  Temp:  [97.6 °F (36.4 °C)-98.9 °F (37.2 °C)] 98.3 °F (36.8 °C)  Pulse:  [] 108  Resp:  [20] 20  SpO2:  [93 %-97 %] 97 %  BP: ()/(55-72) 116/72     Weight: 91.7 kg (202 lb 2.6 oz)  Body mass index is 28.2 kg/(m^2).    Intake/Output Summary (Last 24 hours) at 02/03/17 1141  Last data filed at 02/03/17 0829   Gross per 24 hour   Intake              620 ml   Output                0 ml   Net              620 ml      Physical Exam   Constitutional: He is oriented to person, place, and time. He appears well-developed. No distress.   HENT:   Head: Normocephalic and atraumatic.   Mouth/Throat: Oropharynx is clear and moist. No oropharyngeal exudate.   Eyes: EOM are normal. Pupils are equal, round, and reactive to light.   Neck: Neck supple.   Cardiovascular: Normal rate and regular rhythm.    No murmur heard.  Pulmonary/Chest: Effort normal and breath sounds normal. No respiratory distress. He has no wheezes. He exhibits no tenderness.   Abdominal: Soft. Bowel sounds are normal. There is no tenderness.   Musculoskeletal: He exhibits no edema.   Neurological: He is alert and oriented to person, place, and time.   Skin: Skin is warm and dry. No rash noted. He is not diaphoretic.   Psychiatric: He has a normal mood and affect. Thought content normal.   Nursing note and vitals reviewed.      Significant Labs:   CBC:   Recent Labs  Lab 02/02/17 0413 02/03/17  0558   WBC 6.02 5.12   HGB 12.4* 12.1*   HCT 37.0* 36.6*   * 90*     CMP:   Recent Labs  Lab 02/02/17 0413 02/03/17  0558   * 133*   K 3.5 4.1    103   CO2 21* 23   GLU 77 85   BUN 10 8   CREATININE 0.6 0.7   CALCIUM 8.0* 7.8*   PROT 5.5* 5.2*   ALBUMIN 2.1* 2.0*   BILITOT 1.8* 2.0*   ALKPHOS  211* 204*   AST 34 31   ALT 11 10   ANIONGAP 9 7*   EGFRNONAA >60 >60     Coagulation:   Recent Labs  Lab 02/02/17  0413  02/03/17  0558   INR 2.2*  < > 1.4*   APTT 38.0*  --   --    < > = values in this interval not displayed.    Significant Imaging:  No new    Assessment/Plan:      * Biliary obstruction due to cancer  Abnormal liver enzymes  Elevated bilirubin  MRI MRCP as above.  Elevated alk phos and t bili., remaining stable.  Dr. Abel GI Consult:   Elevated LFTs/Biliary obstruction/Weakness  - Vit K times 2  INR  Down to 1.4  - ERCP with possible stent placement today  - risk factors: metastatic cancer, htn, chronic pain  - monitor LFTs        HTN (hypertension), benign  SBP .  Resume home lisinopril 20 mg with parameters. Monitor.       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 Calzada.      Anxiety  Pt calm at present.  Continue home alprazolam 0.25 mg TID prn        Chemotherapy-induced thrombocytopenia  Plt down to 94 from 136 on admit.  No outpatient transfusion of platelets.  On warfarin for h/o portal vein thrombosis.  No additional anticoagulation.  No visible bleeding. Holding. Vit K. X's 2 Yesterday.  INR down 1.4.   Monitor.         VTE Risk Mitigation         Ordered     Medium Risk of VTE  Once      01/31/17 2034     Place sequential compression device  Until discontinued      01/31/17 2034     Place RENATA hose  Until discontinued      01/31/17 2034          Zara Garcia NP  Department of Hospital Medicine   [facility]

## 2017-02-03 NOTE — ASSESSMENT & PLAN NOTE
Plt down to 94 from 136 on admit.  No outpatient transfusion of platelets.  On warfarin for h/o portal vein thrombosis.  No additional anticoagulation.  No visible bleeding. Holding. Vit K. X's 2 Yesterday.  INR down 1.4.   Monitor.

## 2017-02-03 NOTE — ANESTHESIA PREPROCEDURE EVALUATION
02/03/2017     Berlin Villa is a 56 y.o., male w metastatic cancer to liver for ERCP.     Vitals:    02/03/17 0729   BP: 116/72   Pulse: 108   Resp: 20   Temp: 36.8 °C (98.3 °F)     PRIOR ANES  PRIOR ANES  2016-06-27 Port-a-Cath GA [mid 2, fent 100, prop 200 VSS] [sevo, VSS] [LMA-4] [facility]  2014-05-21 Colonoscopy MAC propofol 280mg VSS [facility]    ANES-RELATED MED/SURG  Colon CA w liver metastases   - biliary obstruction   - portal vein thrombosis  HTN  Obesity  GERD    Past Surgical History   Procedure Laterality Date    Colon surgery      Cataract extraction Bilateral      ALLERGIES 2017-01-31  No Known Allergies    ANES-RELATED MAR 2017-02-02  lisinopril pantoprazole    [facility] Anesthesia Evaluation    I have reviewed the Patient Summary Reports.    I have reviewed the Nursing Notes.   I have reviewed the Medications.     Review of Systems  Anesthesia Hx:  No problems with previous Anesthesia  Denies Family Hx of Anesthesia complications.   Denies Personal Hx of Anesthesia complications.   Social:  Former Smoker, No Alcohol Use    Hematology/Oncology:         -- Anemia:   Cardiovascular:   Hypertension ECG has been reviewed.    Hepatic/GI:   GERD Liver Disease,    Musculoskeletal:   Arthritis   Spine Disorders: Disc disease and Degenerative disease      Wt Readings from Last 3 Encounters:   01/31/17 91.7 kg (202 lb 2.6 oz)   01/31/17 98.7 kg (217 lb 9.5 oz)   12/23/16 98.7 kg (217 lb 9.5 oz)     Temp Readings from Last 3 Encounters:   02/03/17 36.8 °C (98.3 °F) (Oral)   01/31/17 36.7 °C (98.1 °F) (Oral)   01/31/17 36.4 °C (97.5 °F)     BP Readings from Last 3 Encounters:   02/03/17 116/72   01/31/17 122/70   01/31/17 (!) 118/56     Pulse Readings from Last 3 Encounters:   02/03/17 108   01/31/17 96   01/31/17 103       Physical Exam  General:  Well nourished    Airway/Jaw/Neck:  Airway Findings: Mouth Opening:  Normal Tongue: Normal  General Airway Assessment: Adult  Mallampati: IV  Improves to III with phonation.  TM Distance: 4 - 6 cm  Jaw/Neck Findings:  Neck ROM: Normal ROM      Dental:  Dental Findings: upper partial dentures        Mental Status:  Mental Status Findings:  Cooperative, Alert and Oriented        Lab Results   Component Value Date    WBC 5.12 02/03/2017    HGB 12.1 (L) 02/03/2017    HCT 36.6 (L) 02/03/2017    PLT 90 (L) 02/03/2017    CHOL 222 (H) 04/20/2016    TRIG 101 04/20/2016    HDL 38 (L) 04/20/2016    ALT 10 02/03/2017    AST 31 02/03/2017     (L) 02/03/2017    K 4.1 02/03/2017     02/03/2017    CREATININE 0.7 02/03/2017    BUN 8 02/03/2017    CO2 23 02/03/2017    TSH 1.239 12/03/2012    INR 1.4 (H) 02/03/2017    HGBA1C 5.5 12/03/2012           EKG 2016-06-24  Normal sinus rhythm  Normal ECG  No previous ECGs available  Confirmed by ADAL      Anesthesia Plan  Type of Anesthesia, risks & benefits discussed:  Anesthesia Type:  general  Patient's Preference:   Intra-op Monitoring Plan:   Intra-op Monitoring Plan Comments:   Post Op Pain Control Plan:   Post Op Pain Control Plan Comments:   Induction:    Beta Blocker:  Patient is not currently on a Beta-Blocker (No further documentation required).       Informed Consent: Patient understands risks and agrees with Anesthesia plan.  Questions answered. Anesthesia consent signed with patient.  ASA Score: 3     Day of Surgery Review of History & Physical: I have interviewed and examined the patient. I have reviewed the patient's H&P dated:  There are no significant changes.          Ready For Surgery From Anesthesia Perspective.

## 2017-02-03 NOTE — ANESTHESIA POSTPROCEDURE EVALUATION
"Anesthesia Post Evaluation    Patient: Berlin Villa    Procedure(s) Performed: Procedure(s) (LRB):  ERCP (N/A)    Final Anesthesia Type: general  Patient location during evaluation: PACU  Patient participation: Yes- Able to Participate  Level of consciousness: awake and alert  Post-procedure vital signs: reviewed and stable  Pain management: adequate  Airway patency: patent  PONV status at discharge: No PONV  Anesthetic complications: no      Cardiovascular status: hemodynamically stable and blood pressure returned to baseline  Respiratory status: room air, spontaneous ventilation and unassisted  Hydration status: euvolemic  Follow-up not needed.        Visit Vitals    /64    Pulse 100    Temp 36.6 °C (97.9 °F)    Resp 19    Ht 5' 11" (1.803 m)    Wt 91.7 kg (202 lb 2.6 oz)    SpO2 (!) 93%    BMI 28.2 kg/m2       Pain/Hallie Score: Pain Assessment Performed: Yes (2/3/2017  1:30 PM)  Presence of Pain: denies (2/3/2017  1:30 PM)  Pain Rating Prior to Med Admin: 4 (2/2/2017  8:31 PM)  Pain Rating Post Med Admin: 2 (2/2/2017 10:15 AM)  Hallie Score: 9 (2/3/2017  1:30 PM)  Modified Hallie Score: 18 (2/3/2017  1:30 PM)      "

## 2017-02-04 VITALS
BODY MASS INDEX: 28.31 KG/M2 | TEMPERATURE: 98 F | DIASTOLIC BLOOD PRESSURE: 55 MMHG | WEIGHT: 202.19 LBS | HEIGHT: 71 IN | SYSTOLIC BLOOD PRESSURE: 101 MMHG | RESPIRATION RATE: 18 BRPM | HEART RATE: 110 BPM | OXYGEN SATURATION: 91 %

## 2017-02-04 LAB
ALBUMIN SERPL BCP-MCNC: 2.1 G/DL
ALBUMIN SERPL BCP-MCNC: 2.2 G/DL
ALP SERPL-CCNC: 259 U/L
ALP SERPL-CCNC: 263 U/L
ALT SERPL W/O P-5'-P-CCNC: 14 U/L
ALT SERPL W/O P-5'-P-CCNC: 17 U/L
ANION GAP SERPL CALC-SCNC: 11 MMOL/L
ANION GAP SERPL CALC-SCNC: 9 MMOL/L
AST SERPL-CCNC: 46 U/L
AST SERPL-CCNC: 57 U/L
BASOPHILS # BLD AUTO: 0 K/UL
BASOPHILS NFR BLD: 0 %
BILIRUB DIRECT SERPL-MCNC: 4.6 MG/DL
BILIRUB SERPL-MCNC: 6 MG/DL
BILIRUB SERPL-MCNC: 6.7 MG/DL
BUN SERPL-MCNC: 10 MG/DL
BUN SERPL-MCNC: 10 MG/DL
CALCIUM SERPL-MCNC: 8.3 MG/DL
CALCIUM SERPL-MCNC: 8.4 MG/DL
CHLORIDE SERPL-SCNC: 101 MMOL/L
CHLORIDE SERPL-SCNC: 102 MMOL/L
CO2 SERPL-SCNC: 22 MMOL/L
CO2 SERPL-SCNC: 23 MMOL/L
CREAT SERPL-MCNC: 0.7 MG/DL
CREAT SERPL-MCNC: 0.7 MG/DL
DIFFERENTIAL METHOD: ABNORMAL
EOSINOPHIL # BLD AUTO: 0 K/UL
EOSINOPHIL NFR BLD: 0 %
ERYTHROCYTE [DISTWIDTH] IN BLOOD BY AUTOMATED COUNT: 15.7 %
EST. GFR  (AFRICAN AMERICAN): >60 ML/MIN/1.73 M^2
EST. GFR  (AFRICAN AMERICAN): >60 ML/MIN/1.73 M^2
EST. GFR  (NON AFRICAN AMERICAN): >60 ML/MIN/1.73 M^2
EST. GFR  (NON AFRICAN AMERICAN): >60 ML/MIN/1.73 M^2
GLUCOSE SERPL-MCNC: 111 MG/DL
GLUCOSE SERPL-MCNC: 132 MG/DL
HCT VFR BLD AUTO: 43.2 %
HGB BLD-MCNC: 14.5 G/DL
INR PPP: 1.3
LYMPHOCYTES # BLD AUTO: 0.7 K/UL
LYMPHOCYTES NFR BLD: 7.4 %
MAGNESIUM SERPL-MCNC: 1.9 MG/DL
MCH RBC QN AUTO: 32.9 PG
MCHC RBC AUTO-ENTMCNC: 33.6 %
MCV RBC AUTO: 98 FL
MONOCYTES # BLD AUTO: 0.6 K/UL
MONOCYTES NFR BLD: 6.8 %
NEUTROPHILS # BLD AUTO: 7.7 K/UL
NEUTROPHILS NFR BLD: 85.7 %
PHOSPHATE SERPL-MCNC: 2.6 MG/DL
PLATELET # BLD AUTO: 102 K/UL
PMV BLD AUTO: 10.2 FL
POTASSIUM SERPL-SCNC: 4 MMOL/L
POTASSIUM SERPL-SCNC: 4.5 MMOL/L
PROT SERPL-MCNC: 5.8 G/DL
PROT SERPL-MCNC: 5.9 G/DL
PROTHROMBIN TIME: 13.7 SEC
RBC # BLD AUTO: 4.41 M/UL
SODIUM SERPL-SCNC: 134 MMOL/L
SODIUM SERPL-SCNC: 134 MMOL/L
WBC # BLD AUTO: 8.95 K/UL

## 2017-02-04 PROCEDURE — 80053 COMPREHEN METABOLIC PANEL: CPT

## 2017-02-04 PROCEDURE — 36415 COLL VENOUS BLD VENIPUNCTURE: CPT

## 2017-02-04 PROCEDURE — 25000003 PHARM REV CODE 250: Performed by: HOSPITALIST

## 2017-02-04 PROCEDURE — 85610 PROTHROMBIN TIME: CPT

## 2017-02-04 PROCEDURE — 80076 HEPATIC FUNCTION PANEL: CPT

## 2017-02-04 PROCEDURE — 83735 ASSAY OF MAGNESIUM: CPT

## 2017-02-04 PROCEDURE — 94761 N-INVAS EAR/PLS OXIMETRY MLT: CPT

## 2017-02-04 PROCEDURE — 25000003 PHARM REV CODE 250: Performed by: PHYSICIAN ASSISTANT

## 2017-02-04 PROCEDURE — 85025 COMPLETE CBC W/AUTO DIFF WBC: CPT

## 2017-02-04 PROCEDURE — 84100 ASSAY OF PHOSPHORUS: CPT

## 2017-02-04 PROCEDURE — 80048 BASIC METABOLIC PNL TOTAL CA: CPT

## 2017-02-04 RX ADMIN — OXYCODONE HYDROCHLORIDE 30 MG: 5 TABLET ORAL at 01:02

## 2017-02-04 RX ADMIN — PANTOPRAZOLE SODIUM 40 MG: 40 TABLET, DELAYED RELEASE ORAL at 08:02

## 2017-02-04 RX ADMIN — AMITRIPTYLINE HYDROCHLORIDE 10 MG: 10 TABLET, FILM COATED ORAL at 08:02

## 2017-02-04 RX ADMIN — LISINOPRIL 20 MG: 20 TABLET ORAL at 08:02

## 2017-02-04 RX ADMIN — ONDANSETRON 8 MG: 8 TABLET, ORALLY DISINTEGRATING ORAL at 03:02

## 2017-02-04 RX ADMIN — ONDANSETRON 8 MG: 8 TABLET, ORALLY DISINTEGRATING ORAL at 01:02

## 2017-02-04 NOTE — PROGRESS NOTES
"[facility]    CC: Biliary obstruction    HPI 56 y.o. male with metastatic colon cancer causing biliary obstruction, now day 1 s/p ERCP with stent placement. Overall he feels improved, notes abdominal discomfort better, no nausea or vomiting. Would like to go home. Afebrile.    Past Medical History   Diagnosis Date    Arthritis      sacroilitis    Cancer 1999     colon cancer    Cataract     Colon cancer     Degenerative disc disease     Foot pain     GERD (gastroesophageal reflux disease)     Hypertension          Review of Systems  General ROS: negative for chills, fever or weight loss  Cardiovascular ROS: no chest pain or dyspnea on exertion  Gastrointestinal ROS: improved abdominal discomfort, no vomiting, no diarrhea    Physical Examination  Visit Vitals    BP (!) 101/55    Pulse 110    Temp 98.4 °F (36.9 °C) (Oral)    Resp 18    Ht 5' 11" (1.803 m)    Wt 91.7 kg (202 lb 2.6 oz)    SpO2 (!) 91%    BMI 28.2 kg/m2     General appearance: alert, cooperative, no distress  HENT: Normocephalic, atraumatic, neck symmetrical, no nasal discharge   Lungs: clear to auscultation bilaterally, no dullness to percussion bilaterally  Heart: regular rate and rhythm without rub; no displacement of the PMI   Abdomen: soft, mild distention, hypoactive BS, nontender  Extremities: extremities symmetric; no clubbing, cyanosis, or edema  Neurologic: Alert and oriented X 3, normal strength, normal coordination and gait    Labs:  Lab Results   Component Value Date    WBC 8.95 02/04/2017    HGB 14.5 02/04/2017    HCT 43.2 02/04/2017    MCV 98 02/04/2017     (L) 02/04/2017     CMP  Sodium   Date Value Ref Range Status   02/04/2017 134 (L) 136 - 145 mmol/L Final     Potassium   Date Value Ref Range Status   02/04/2017 4.5 3.5 - 5.1 mmol/L Final     Chloride   Date Value Ref Range Status   02/04/2017 101 95 - 110 mmol/L Final     CO2   Date Value Ref Range Status   02/04/2017 22 (L) 23 - 29 mmol/L Final "     Glucose   Date Value Ref Range Status   02/04/2017 132 (H) 70 - 110 mg/dL Final     BUN, Bld   Date Value Ref Range Status   02/04/2017 10 6 - 20 mg/dL Final     Creatinine   Date Value Ref Range Status   02/04/2017 0.7 0.5 - 1.4 mg/dL Final     Calcium   Date Value Ref Range Status   02/04/2017 8.3 (L) 8.7 - 10.5 mg/dL Final     Total Protein   Date Value Ref Range Status   02/04/2017 5.9 (L) 6.0 - 8.4 g/dL Final     Albumin   Date Value Ref Range Status   02/04/2017 2.1 (L) 3.5 - 5.2 g/dL Final     Total Bilirubin   Date Value Ref Range Status   02/04/2017 6.0 (H) 0.1 - 1.0 mg/dL Final     Comment:     For infants and newborns, interpretation of results should be based  on gestational age, weight and in agreement with clinical  observations.  Premature Infant recommended reference ranges:  Up to 24 hours.............<8.0 mg/dL  Up to 48 hours............<12.0 mg/dL  3-5 days..................<15.0 mg/dL  6-29 days.................<15.0 mg/dL       Alkaline Phosphatase   Date Value Ref Range Status   02/04/2017 259 (H) 55 - 135 U/L Final     AST   Date Value Ref Range Status   02/04/2017 46 (H) 10 - 40 U/L Final     ALT   Date Value Ref Range Status   02/04/2017 14 10 - 44 U/L Final     Anion Gap   Date Value Ref Range Status   02/04/2017 11 8 - 16 mmol/L Final     eGFR if    Date Value Ref Range Status   02/04/2017 >60 >60 mL/min/1.73 m^2 Final     eGFR if non    Date Value Ref Range Status   02/04/2017 >60 >60 mL/min/1.73 m^2 Final     Comment:     Calculation used to obtain the estimated glomerular filtration  rate (eGFR) is the CKD-EPI equation. Since race is unknown   in our information system, the eGFR values for   -American and Non--American patients are given   for each creatinine result.         Assessment:   55 yo male with metastatic colon cancer causing biliary obstruction, s/p ERCP yesterday with stent placement. He feels improved after procedure. His  bilirubin has slightly increased which can be seen due to ERCP, he has no signs of cholangitis.    Plan:  -Ok to discharge home from GI perspective  -May follow up as outpatient with [facility] in 2-4 weeks    Rosalind Shepard MD  Pager [phone]

## 2017-02-04 NOTE — PROGRESS NOTES
[facility] Hospital Medicine  Progress Note    Patient Name: Berlin Villa  MRN: [id number]  Patient Class: IP- Inpatient   Admission Date: 1/31/2017  Length of Stay: 3 days  Attending Physician: Noam Hansen MD  Primary Care Provider: Luis M Ruelas MD        Subjective:     Principal Problem:Biliary obstruction due to cancer    HPI:  57 yo WM with metastatic colon cancer(originally diagnosed in 2010) to the liver (May 2016) that is currently undergoing chemotherapy with Dr. Calzada that presented to her office today complaining of increased back/abdominal pain. He was found to have elevation of his TBili to 2.2 from 1 a month prior. He has elevations in his AST and Alk Phos as well. He underwent MRI/MRCP today of his abdomen that showed enlargement of the hepatic mass and evidence of right hepatic duct obstruction. He is being transferred to [facility] from [facility] for evaluation by GI and consideration of ERCP and biliary stenting if needed.    His main complaint is back pain for the past month which he describes as bilateral and constant.  States spending a lot of time in bed because of it.  Denies CP, SOB, abdominal pain, N/V/D.      Denies current smoking. States occasional alcohol.  Denies use of illicit drugs.            Hospital Course:  Coumadin was held and Vit K daily for 2 days given, ERCP delayed.  INR down to 1.4.  2/4/17 ERCP with biliary Stent placement.  His bilirubin increased from 2 to 6.7, which GI says is not unusual and can be discharged.  His back pain is improved and he denies nausea and vomiting.  He will discharge an follow up with [facility] in 2-4 weeks.  He will restart his prior Coumadin dosing and follow up with his Coumadin clinic on the [facility].     Review of Systems   Constitutional: Negative for chills and fever.   HENT: Negative for congestion.    Respiratory: Negative for shortness of breath and wheezing.    Cardiovascular: Negative for chest  pain and palpitations.   Gastrointestinal: Negative for abdominal distention, abdominal pain, nausea and vomiting.   Genitourinary: Negative for urgency.   Musculoskeletal: Negative for arthralgias, back pain and myalgias.   Skin: Negative for pallor, rash and wound.     Objective:     Vital Signs (Most Recent):  Temp: 98.4 °F (36.9 °C) (02/04/17 1200)  Pulse: 110 (02/04/17 1200)  Resp: 18 (02/04/17 1200)  BP: (!) 101/55 (02/04/17 1200)  SpO2: (!) 91 % (02/04/17 1215) Vital Signs (24h Range):  Temp:  [97.1 °F (36.2 °C)-98.4 °F (36.9 °C)] 98.4 °F (36.9 °C)  Pulse:  [] 110  Resp:  [18-19] 18  SpO2:  [91 %-96 %] 91 %  BP: ()/(53-90) 101/55     Weight: 91.7 kg (202 lb 2.6 oz)  Body mass index is 28.2 kg/(m^2).  No intake or output data in the 24 hours ending 02/04/17 1429   Physical Exam   Constitutional: He is oriented to person, place, and time. He appears well-developed. No distress.   HENT:   Head: Normocephalic and atraumatic.   Mouth/Throat: Oropharynx is clear and moist. No oropharyngeal exudate.   Eyes: EOM are normal. Pupils are equal, round, and reactive to light.   Neck: Neck supple.   Cardiovascular: Normal rate and regular rhythm.    No murmur heard.  Pulmonary/Chest: Effort normal and breath sounds normal. No respiratory distress. He has no wheezes. He exhibits no tenderness.   Abdominal: Soft. Bowel sounds are normal. There is no tenderness.   Musculoskeletal: He exhibits no edema.   Neurological: He is alert and oriented to person, place, and time.   Skin: Skin is warm and dry. No rash noted. He is not diaphoretic.   Psychiatric: He has a normal mood and affect. Thought content normal.   Nursing note and vitals reviewed.      Significant Labs:   CBC:   Recent Labs  Lab 02/03/17  0558 02/04/17  0430   WBC 5.12 8.95   HGB 12.1* 14.5   HCT 36.6* 43.2   PLT 90* 102*     CMP:   Recent Labs  Lab 02/03/17  0558 02/04/17  0429 02/04/17  1156   * 134* 134*   K 4.1 4.5 4.0    101 102   CO2  23 22* 23   GLU 85 132* 111*   BUN 8 10 10   CREATININE 0.7 0.7 0.7   CALCIUM 7.8* 8.3* 8.4*   PROT 5.2* 5.9* 5.8*   ALBUMIN 2.0* 2.1* 2.2*   BILITOT 2.0* 6.0* 6.7*   ALKPHOS 204* 259* 263*   AST 31 46* 57*   ALT 10 14 17   ANIONGAP 7* 11 9   EGFRNONAA >60 >60 >60     Coagulation:   Recent Labs  Lab 02/04/17  0430   INR 1.3*         Assessment/Plan:      * Biliary obstruction due to cancer  Abnormal liver enzymes  Elevated bilirubin  MRI MRCP as above.  Elevated alk phos and t bili., remaining stable.  Dr. Abel GI Consult:   Elevated LFTs/Biliary obstruction/Weakness  - ERCP with stent placement Dr. Reyna 2/3/17  - Tbili increased from 2 to 6.7, but can be expected with Stent Placment  -Follow up with [facility] in 2-4 weeks    HTN (hypertension), benign  SBP .  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 Calzada.      Anxiety  Pt calm at present.  Continue home alprazolam 0.25 mg TID prn        Chemotherapy-induced thrombocytopenia  Plt down to 94 from 136 on admit.  No outpatient transfusion of platelets.  On warfarin for h/o portal vein thrombosis.  No additional anticoagulation.  No visible bleeding. Holding. Vit K. X's 2. INR 1.3. Resume home Coumadin, Monitor out-patient      Portal vein thrombosis  Restart home Coumadin 2 mg daily. Follow up out-patient.       VTE Risk Mitigation         Ordered     Medium Risk of VTE  Once      01/31/17 2034     Place sequential compression device  Until discontinued      01/31/17 2034     Place RENATA hose  Until discontinued      01/31/17 2034          Zara Garcia NP  Department of Hospital Medicine   [facility]

## 2017-02-04 NOTE — DISCHARGE INSTRUCTIONS
Keep any prior scheduled appointments with your PCP and Dr. Calzaad.  Resume your Coumadin 2 mg daily, today and follow up with your Prior coumadin management in 1 week.  Make an appointment with Dr. Reyna in 2-4 weeks for biliary stent follow up.

## 2017-02-04 NOTE — SUBJECTIVE & OBJECTIVE
Review of Systems   Constitutional: Negative for chills and fever.   HENT: Negative for congestion.    Respiratory: Negative for shortness of breath and wheezing.    Cardiovascular: Negative for chest pain and palpitations.   Gastrointestinal: Negative for abdominal distention, abdominal pain, nausea and vomiting.   Genitourinary: Negative for urgency.   Musculoskeletal: Negative for arthralgias, back pain and myalgias.   Skin: Negative for pallor, rash and wound.     Objective:     Vital Signs (Most Recent):  Temp: 98.4 °F (36.9 °C) (02/04/17 1200)  Pulse: 110 (02/04/17 1200)  Resp: 18 (02/04/17 1200)  BP: (!) 101/55 (02/04/17 1200)  SpO2: (!) 91 % (02/04/17 1215) Vital Signs (24h Range):  Temp:  [97.1 °F (36.2 °C)-98.4 °F (36.9 °C)] 98.4 °F (36.9 °C)  Pulse:  [] 110  Resp:  [18-19] 18  SpO2:  [91 %-96 %] 91 %  BP: ()/(53-90) 101/55     Weight: 91.7 kg (202 lb 2.6 oz)  Body mass index is 28.2 kg/(m^2).  No intake or output data in the 24 hours ending 02/04/17 1429   Physical Exam   Constitutional: He is oriented to person, place, and time. He appears well-developed. No distress.   HENT:   Head: Normocephalic and atraumatic.   Mouth/Throat: Oropharynx is clear and moist. No oropharyngeal exudate.   Eyes: EOM are normal. Pupils are equal, round, and reactive to light.   Neck: Neck supple.   Cardiovascular: Normal rate and regular rhythm.    No murmur heard.  Pulmonary/Chest: Effort normal and breath sounds normal. No respiratory distress. He has no wheezes. He exhibits no tenderness.   Abdominal: Soft. Bowel sounds are normal. There is no tenderness.   Musculoskeletal: He exhibits no edema.   Neurological: He is alert and oriented to person, place, and time.   Skin: Skin is warm and dry. No rash noted. He is not diaphoretic.   Psychiatric: He has a normal mood and affect. Thought content normal.   Nursing note and vitals reviewed.      Significant Labs:   CBC:   Recent Labs  Lab 02/03/17  0558  02/04/17  0430   WBC 5.12 8.95   HGB 12.1* 14.5   HCT 36.6* 43.2   PLT 90* 102*     CMP:   Recent Labs  Lab 02/03/17  0558 02/04/17  0429 02/04/17  1156   * 134* 134*   K 4.1 4.5 4.0    101 102   CO2 23 22* 23   GLU 85 132* 111*   BUN 8 10 10   CREATININE 0.7 0.7 0.7   CALCIUM 7.8* 8.3* 8.4*   PROT 5.2* 5.9* 5.8*   ALBUMIN 2.0* 2.1* 2.2*   BILITOT 2.0* 6.0* 6.7*   ALKPHOS 204* 259* 263*   AST 31 46* 57*   ALT 10 14 17   ANIONGAP 7* 11 9   EGFRNONAA >60 >60 >60     Coagulation:   Recent Labs  Lab 02/04/17  0430   INR 1.3*

## 2017-02-04 NOTE — PLAN OF CARE
Pt lying in bed resting. No acute distress noted. Bed alarm on, call light in reach, fall precautions in place. Report given to oncoming nurse.

## 2017-02-04 NOTE — DISCHARGE SUMMARY
[facility] Hospital Medicine  Discharge Summary      Patient Name: Berlin Villa  MRN: [id number]  Admission Date: 1/31/2017  Hospital Length of Stay: 3 days  Discharge Date and Time: 2/4/2017 2:52 PM  Attending Physician: Noam Hansen MD   Discharging Provider: Zara Garcia NP  Primary Care Provider: Luis M Ruelas MD      HPI:   57 yo WM with metastatic colon cancer(originally diagnosed in 2010) to the liver (May 2016) that is currently undergoing chemotherapy with Dr. Calzada that presented to her office today complaining of increased back/abdominal pain. He was found to have elevation of his TBili to 2.2 from 1 a month prior. He has elevations in his AST and Alk Phos as well. He underwent MRI/MRCP today of his abdomen that showed enlargement of the hepatic mass and evidence of right hepatic duct obstruction. He is being transferred to [facility] from [facility] for evaluation by GI and consideration of ERCP and biliary stenting if needed.    His main complaint is back pain for the past month which he describes as bilateral and constant.  States spending a lot of time in bed because of it.  Denies CP, SOB, abdominal pain, N/V/D.      Denies current smoking. States occasional alcohol.  Denies use of illicit drugs.            Procedure(s) (LRB):  ERCP (N/A)      Indwelling Lines/Drains at time of discharge:   Lines/Drains/Airways     Central Venous Catheter Line                 Port A Cath Single Lumen right subclavian -- days         Port A Cath Single Lumen 06/27/16 1312 right subclavian 222 days              Hospital Course:   Coumadin was held and Vit K daily for 2 days given, ERCP delayed.  INR down to 1.4.  2/4/17 ERCP with biliary Stent placement.  His bilirubin increased from 2 to 6.7, which GI says is not unusual and can be discharged.  His back pain is improved and he denies nausea and vomiting.  He will discharge an follow up with [facility] in 2-4 weeks.  He will  restart his prior Coumadin dosing and follow up with his Coumadin clinic on the [facility].      Consults:   Consults         Status Ordering Provider     Inpatient consult to [facility]  Once     Provider:  Helio Edwards MD    Completed YENNI COLE          Significant Diagnostic Studies: Labs:   CMP   Recent Labs  Lab 02/03/17  0558 02/04/17  0429 02/04/17  1156   * 134* 134*   K 4.1 4.5 4.0    101 102   CO2 23 22* 23   GLU 85 132* 111*   BUN 8 10 10   CREATININE 0.7 0.7 0.7   CALCIUM 7.8* 8.3* 8.4*   PROT 5.2* 5.9* 5.8*   ALBUMIN 2.0* 2.1* 2.2*   BILITOT 2.0* 6.0* 6.7*   ALKPHOS 204* 259* 263*   AST 31 46* 57*   ALT 10 14 17   ANIONGAP 7* 11 9   ESTGFRAFRICA >60 >60 >60   EGFRNONAA >60 >60 >60   , CBC   Recent Labs  Lab 02/03/17  0558 02/04/17  0430   WBC 5.12 8.95   HGB 12.1* 14.5   HCT 36.6* 43.2   PLT 90* 102*   , INR   Lab Results   Component Value Date    INR 1.3 (H) 02/04/2017    INR 1.4 (H) 02/03/2017    INR 2.3 (H) 02/02/2017    and Lipid Panel   Lab Results   Component Value Date    CHOL 222 (H) 04/20/2016    HDL 38 (L) 04/20/2016    LDLCALC 163.8 (H) 04/20/2016    TRIG 101 04/20/2016    CHOLHDL 17.1 (L) 04/20/2016     Procedures: 2/3/17 ERCP with Biliary Stent Placement, Dr. Reyna    Pending Diagnostic Studies:     None        Final Active Diagnoses:    Diagnosis Date Noted POA    PRINCIPAL PROBLEM:  Biliary obstruction due to cancer [K83.1] 01/31/2017 Yes    Portal vein thrombosis [I81] 02/01/2017 Yes    Abnormal liver enzymes [R74.8] 01/31/2017 Yes    Elevated bilirubin [R17] 01/31/2017 Yes    Chemotherapy-induced thrombocytopenia [D69.59, T45.1X5A] 10/31/2016 Yes    Anxiety [F41.9] 08/09/2016 Yes    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] 06/23/2016 Yes    Metastasis to liver [C78.7] 06/23/2016 Yes    History of colon cancer [Z85.038] 04/25/2016 Yes    HTN (hypertension), benign [I10] 11/30/2012 Yes      Problems Resolved  During this Admission:    Diagnosis Date Noted Date Resolved POA      * Biliary obstruction due to cancer  Abnormal liver enzymes  Elevated bilirubin  MRI MRCP as above.  Elevated alk phos and t bili., remaining stable.  Dr. Abel GI Consult:   Elevated LFTs/Biliary obstruction/Weakness  - ERCP with stent placement Dr. Reyna 2/3/17  - Tbili increased from 2 to 6.7, but can be expected with Stent Placment  -Follow up with [facility] in 2-4 weeks    HTN (hypertension), benign  SBP .  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 Calzada.      Anxiety  Pt calm at present.  Continue home alprazolam 0.25 mg TID prn        Chemotherapy-induced thrombocytopenia  Plt down to 94 from 136 on admit.  No outpatient transfusion of platelets.  On warfarin for h/o portal vein thrombosis.  No additional anticoagulation.  No visible bleeding. Holding. Vit K. X's 2. INR 1.3. Resume home Coumadin, Monitor out-patient      Portal vein thrombosis  Restart home Coumadin 2 mg daily. Follow up out-patient.         Discharged Condition: good    Disposition: Home or Self Care    Follow Up:  Follow-up Information     Follow up with Luis M Ruelas MD.    Specialty:  Family Medicine    Contact information:    [address] [facility]  [place] [id number] [phone]          Follow up with Josh Reyna MD. Schedule an appointment as soon as possible for a visit in 3 weeks.    Specialty:  Gastroenterology    Why:  Biliary Stent Follow Up     Contact information:    [address]  [address]  [place] [address] [phone]          Follow up with Karoline Calzada MD.    Specialty:  Hematology and Oncology    Contact information:    [address] [place] [address] [address]  [place] [address]  [phone]          Patient Instructions:     Diet general   Order Comments: [place] Diet, Low Fat     Activity as tolerated     Call MD for:  temperature  >100.4     Call MD for:  persistent nausea and vomiting or diarrhea     Call MD for:  severe uncontrolled pain       Medications:  Reconciled Home Medications:   Current Discharge Medication List      CONTINUE these medications which have NOT CHANGED    Details   amitriptyline (ELAVIL) 10 MG tablet Take 1 tablet (10 mg total) by mouth 2 (two) times daily.  Qty: 60 tablet, Refills: 1    Associated Diagnoses: Pain      lisinopril (PRINIVIL,ZESTRIL) 20 MG tablet Take 1 tablet (20 mg total) by mouth once daily.  Qty: 30 tablet, Refills: 6    Associated Diagnoses: Essential hypertension      ondansetron (ZOFRAN) 8 MG tablet Take 1 tablet (8 mg total) by mouth every 12 (twelve) hours as needed for Nausea.  Qty: 30 tablet, Refills: 2    Associated Diagnoses: Chemoprophylaxis      oxycodone (ROXICODONE) 30 MG Tab Take 1 tablet (30 mg total) by mouth 4 (four) times daily.  Qty: 120 tablet, Refills: 0    Associated Diagnoses: Pain      pantoprazole (PROTONIX) 20 MG tablet Take 20 mg by mouth once daily.  Refills: 3      prochlorperazine (COMPAZINE) 10 MG tablet Take 1 tablet (10 mg total) by mouth every 6 (six) hours as needed.  Qty: 30 tablet, Refills: 1    Associated Diagnoses: Chemoprophylaxis      warfarin (COUMADIN) 2 MG tablet Take 1 tablet (2 mg total) by mouth Daily.  Qty: 30 tablet, Refills: 1    Associated Diagnoses: PVT (portal vein thrombosis)      alprazolam (XANAX) 0.25 MG tablet Take 1 tablet (0.25 mg total) by mouth 3 (three) times daily as needed for Anxiety.  Qty: 90 tablet, Refills: 0    Associated Diagnoses: Anxiety         STOP taking these medications       esomeprazole magnesium (NEXIUM 24HR) 20 mg TbEC Comments:   Reason for Stopping:             Time spent on the discharge of patient: 35  minutes    Zara Garcia NP  Department of Hospital Medicine  [facility]

## 2017-02-04 NOTE — ASSESSMENT & PLAN NOTE
Abnormal liver enzymes  Elevated bilirubin  MRI MRCP as above.  Elevated alk phos and t bili., remaining stable.  Dr. Abel GI Consult:   Elevated LFTs/Biliary obstruction/Weakness  - ERCP with stent placement Dr. Reyna 2/3/17  - Tbili increased from 2 to 6.7, but can be expected with Stent Placment  -Follow up with [facility] in 2-4 weeks

## 2017-02-04 NOTE — PLAN OF CARE
Problem: Patient Care Overview  Goal: Plan of Care Review  Outcome: Ongoing (interventions implemented as appropriate)  Pt received on RA.  SPO2  96%.  Pt in no apparent respiratory distress.  Will continue to monitor.

## 2017-02-04 NOTE — ASSESSMENT & PLAN NOTE
Plt down to 94 from 136 on admit.  No outpatient transfusion of platelets.  On warfarin for h/o portal vein thrombosis.  No additional anticoagulation.  No visible bleeding. Holding. Vit K. X's 2. INR 1.3. Resume home Coumadin, Monitor out-patient

## 2017-02-04 NOTE — PLAN OF CARE
Problem: Patient Care Overview  Goal: Plan of Care Review  Outcome: Ongoing (interventions implemented as appropriate)  Plan of care reviewed with patient. Pt lying in bed awake and alert. Call light within reach, fall precautions maintained, bed in lowest position, wheels locked, bed alarm set, side rails up x's 2. Patient aware. Nurse instructed patient to call if needs assistance. Patient verbalized complete understanding.     Pt reports minimal pain and refused any prn pain meds. Pt on clear liquid diet. NAD noted. Will continue to monitor.

## 2017-02-04 NOTE — PLAN OF CARE
Problem: Patient Care Overview  Goal: Plan of Care Review  Outcome: Ongoing (interventions implemented as appropriate)  Pt's SpO2 92% on RA. No respiratory distress noted. Will continue to monitor SpO2.

## 2017-02-04 NOTE — PLAN OF CARE
Discharge orders noted. No HH or DME.       02/04/17 1457   Final Note   Assessment Type Final Discharge Note   Discharge Disposition Home   What phone number can be called within the next 1-3 days to see how you are doing after discharge? [phone]   Hospital Follow Up  Appt(s) scheduled? No  (offices closed on weekend, [place] to call with follow up)   Offered [facility] -- Bedside Delivery? n/a  (not available on weekend)   Discharge/Hospital Encounter Summary to ([facility]) PCP Yes   Referral to Outpatient Case Management complete? n/a   Referral to / orders for Home Health Complete? n/a   30 day supply of medicines given at discharge, if documented non-compliance / non-adherence? n/a   Any social issues identified prior to discharge? n/a   Did you assess the readiness or willingness of the family or caregiver to support self management of care? n/a   Right Care Referral Info   Post Acute Recommendation No Care     Marilee Rosado, RN Transitional Navigator  [phone]

## 2017-02-04 NOTE — NURSING
Discharge instructions and education reviewed with pt. Reviewed follow up appts, diet, and importance of medication compliance. Allowed time for questions. Patient verbalized complete understanding.    PIV discontinued. Catheter tip intact. Secured with gauze and coban. Patient tolerated well. No acute distress noted.

## 2017-02-06 ENCOUNTER — TELEPHONE (OUTPATIENT)
Dept: HEMATOLOGY/ONCOLOGY | Facility: CLINIC | Age: 57
End: 2017-02-06

## 2017-02-06 ENCOUNTER — PATIENT OUTREACH (OUTPATIENT)
Dept: ADMINISTRATIVE | Facility: CLINIC | Age: 57
End: 2017-02-06
Payer: MEDICAID

## 2017-02-06 ENCOUNTER — TELEPHONE (OUTPATIENT)
Dept: FAMILY MEDICINE | Facility: CLINIC | Age: 57
End: 2017-02-06

## 2017-02-06 NOTE — PROGRESS NOTES
[facility] placed follow up call to patient. Patient informed [facility] that someone already called and he was aware of his follow up appts.

## 2017-02-06 NOTE — TELEPHONE ENCOUNTER
----- Message from Usha Thrasher sent at 2/6/2017  9:37 AM CST -----  Contact: spouse rhonda novoa #[phone]  Please call spouse rhonda novoa #[phone] to schedule a hospital follow up   Discharge from [facility] [place]

## 2017-02-06 NOTE — PATIENT INSTRUCTIONS
Discharge Instructions for Cancer of the Liver  You have been diagnosed with liver cancer, the abnormal and uncontrolled growth of cells in the liver leading to the formation of a tumor or mass. Treatment for liver cancer may include surgery or systemic therapy or ablation/destruction of the tumor through a probe in the skin, or through a blood vessel. Or, you may have had some combination. Very few people get intravenous chemotherapy for hepatocellular carcinoma, or even cholangiocarcinoma. You discussed your treatment plan with your doctor in detail. This sheet helps you remember how to care for yourself after surgery or systemic or local therapy.  Home care after surgery or percutaneous ablative therapy  Heres what to do at home following surgery for liver cancer.  Activity  · Increase your activity gradually. Take short walks on a level surface.  · Dont overexert yourself to the point of fatigue. If you become tired, rest.  · Shower as needed. Ask a friend or family member to stand close by in case you need help.  · Limit stair climbing to once or twice a day. Climb slowly and stop to rest every few steps.  · Dont lift anything heavier than 5 pounds for 4-6 weeks after surgery.  · Dont mow the lawn or push a vacuum .  · If you ride in the car for more than short trips, stop frequently to stretch your legs. Dont drive until your doctor says its okay.  · Ask your doctor when you can expect to return to work.  Incision care  · Wash the incision site with soap and water and pat dry. Avoid scrubbing the incision.  · Inspect the incision site every day for increased redness, drainage, swelling, or separation of the skin.  Other home care  · Take your medications exactly as directed.  · Dont take any over-the-counter supplements or herbal medications unless your doctor says its okay.  · Check your temperature every day for 7 days after your surgery.  · Return to your diet as tolerated. Eat a healthy  well-balanced diet.  Home care after transarterial ablative therapy  Heres what to do at home following systemic therapyfor liver cancer.   Prevent mouth sores  Many people get mouth sores during chemotherapy. So, dont be discouraged if you do, even if you are following all your doctors instructions. Do the following to help prevent mouth sores or to ease discomfort.  · Brush your teeth with a soft-bristle toothbrush after every meal.  · Dont use dental floss if your platelet count is below 50,000. Your doctor or nurse will tell you if this is the case.  · Use an oral swab or special soft toothbrush if your gums bleed during regular brushing. Tell your doctor if bleeding doesnt stop.  · Use any mouthwashes given to you as directed.  · If you cant tolerate regular methods, use salt and baking soda to clean your mouth. Mix 1 teaspoon(s) of salt and 1 teaspoon(s) of baking soda into an 8-ounce glass of warm water. Swish and spit.  · Watch your mouth and tongue for white patches. This is a sign of fungal infection, a common side effect of chemotherapy. Be sure to tell your doctor about these patches. Medication can be prescribed to help you fight the fungal infection.  Manage other side effects  · Try to exercise. Exercise keeps you strong and keeps your heart and lungs active. Walk as much as you can without becoming dizzy or weak. Start slowly. Build your time and intensity level gradually.  · Dont be surprised if your treatment causes slight burns to your skin. Some drugs used in high doses can cause this to happen. Ask for a special cream to help relieve the burn and protect your skin.  · Let your doctor know if your throat is sore. You may have an infection that needs treatment.  · Remember, many patients feel sick and lose their appetites during treatment. Eat small meals several times a day to keep your strength up.  ¨ Choose bland foods with little taste or smell if you are reacting strongly to  food.  ¨ Be sure to cook all food thoroughly. This kills bacteria and helps you avoid infection.  ¨ Eat foods that are soft. They are less likely to cause stomach irritation.  · Keep clean. During treatment your body cant fight germs very well.  ¨ Take short baths or showers with warm water. Avoid very hot or cold water.  ¨ Use moisturizing soap. Treatment can make your skin dry.  ¨ Apply moisturizing lotion several times a day to help relieve dry skin.  Follow-up care  Make a follow-up appointment as directed by our staff.     When to seek medical care  Call your doctor right away if you have any of the following:  · Fever of 100.4°F  (38.0°C) or higher, or chills  · Any unusual bleeding  · New or unusual lumps, bumps, or swelling  · Signs of infection around the incision (redness, drainage, warmth, pain)  · Incision that opens up or pulls apart  · Trouble concentrating  · Ongoing fatigue  · Shortness of breath  · Rapid, irregular heartbeat  · Dizziness, lightheadedness  · Constant feeling of being cold  · Yellowing of the skin or eyes  · Light-colored stools  · Pain in the liver area  · Blood in your stools  · Vomiting blood  · Increasing abdominal size  · Yellow eyes  · Mental confusion   Date Last Reviewed: 3/30/2014  © 2651-0396 [employer]. [address] [place] [address], [place] [address]. All rights reserved. This information is not intended as a substitute for professional medical care. Always follow your healthcare professional's instructions.

## 2017-02-06 NOTE — TELEPHONE ENCOUNTER
----- Message from Usha Thrahser sent at 2/6/2017  9:35 AM CST -----  Contact: spouse rhonda novoa #[phone]  Please call spouse rhonda novoa #[phone] to schedule a hospital follow up   Discharge from [facility] in [place]

## 2017-02-07 ENCOUNTER — TELEPHONE (OUTPATIENT)
Dept: GASTROENTEROLOGY | Facility: CLINIC | Age: 57
End: 2017-02-07

## 2017-02-07 ENCOUNTER — OFFICE VISIT (OUTPATIENT)
Dept: HEMATOLOGY/ONCOLOGY | Facility: CLINIC | Age: 57
End: 2017-02-07
Payer: MEDICAID

## 2017-02-07 ENCOUNTER — HOSPITAL ENCOUNTER (EMERGENCY)
Facility: HOSPITAL | Age: 57
Discharge: HOME | End: 2017-02-07
Attending: EMERGENCY MEDICINE
Payer: MEDICAID

## 2017-02-07 VITALS
DIASTOLIC BLOOD PRESSURE: 81 MMHG | SYSTOLIC BLOOD PRESSURE: 132 MMHG | BODY MASS INDEX: 26.6 KG/M2 | HEART RATE: 120 BPM | OXYGEN SATURATION: 96 % | HEIGHT: 71 IN | WEIGHT: 190 LBS | TEMPERATURE: 98 F | RESPIRATION RATE: 20 BRPM

## 2017-02-07 VITALS
DIASTOLIC BLOOD PRESSURE: 57 MMHG | HEART RATE: 121 BPM | WEIGHT: 209.44 LBS | TEMPERATURE: 98 F | SYSTOLIC BLOOD PRESSURE: 94 MMHG | HEIGHT: 71 IN | BODY MASS INDEX: 29.32 KG/M2

## 2017-02-07 DIAGNOSIS — C78.7 METASTASIS TO LIVER: ICD-10-CM

## 2017-02-07 DIAGNOSIS — R53.1 WEAKNESS: Primary | ICD-10-CM

## 2017-02-07 DIAGNOSIS — E80.6 HYPERBILIRUBINEMIA: ICD-10-CM

## 2017-02-07 DIAGNOSIS — E86.0 DEHYDRATION: ICD-10-CM

## 2017-02-07 DIAGNOSIS — F41.9 ANXIETY: ICD-10-CM

## 2017-02-07 DIAGNOSIS — K83.1 OBSTRUCTIVE JAUNDICE: Primary | ICD-10-CM

## 2017-02-07 DIAGNOSIS — R00.0 TACHYCARDIA: ICD-10-CM

## 2017-02-07 DIAGNOSIS — I95.9 HYPOTENSION, UNSPECIFIED HYPOTENSION TYPE: ICD-10-CM

## 2017-02-07 DIAGNOSIS — C77.2 MALIGNANT NEOPLASM METASTATIC TO INTRA-ABDOMINAL LYMPH NODE: ICD-10-CM

## 2017-02-07 DIAGNOSIS — I81 PORTAL VEIN THROMBOSIS: ICD-10-CM

## 2017-02-07 DIAGNOSIS — K83.1 BILIARY OBSTRUCTION: ICD-10-CM

## 2017-02-07 DIAGNOSIS — G89.4 CHRONIC PAIN SYNDROME: ICD-10-CM

## 2017-02-07 DIAGNOSIS — C18.9 MALIGNANT NEOPLASM OF COLON, UNSPECIFIED PART OF COLON: Primary | ICD-10-CM

## 2017-02-07 DIAGNOSIS — K83.1 BILIARY OBSTRUCTION DUE TO CANCER: Primary | ICD-10-CM

## 2017-02-07 DIAGNOSIS — C80.1 BILIARY OBSTRUCTION DUE TO CANCER: Primary | ICD-10-CM

## 2017-02-07 PROBLEM — E87.1 HYPONATREMIA: Status: ACTIVE | Noted: 2017-02-07

## 2017-02-07 PROCEDURE — 96374 THER/PROPH/DIAG INJ IV PUSH: CPT

## 2017-02-07 PROCEDURE — 99999 PR PBB SHADOW E&M-EST. PATIENT-LVL III: CPT | Mod: PBBFAC,,, | Performed by: INTERNAL MEDICINE

## 2017-02-07 PROCEDURE — 99213 OFFICE O/P EST LOW 20 MIN: CPT | Mod: PBBFAC,PO | Performed by: INTERNAL MEDICINE

## 2017-02-07 PROCEDURE — 96361 HYDRATE IV INFUSION ADD-ON: CPT

## 2017-02-07 PROCEDURE — 63600175 PHARM REV CODE 636 W HCPCS: Performed by: EMERGENCY MEDICINE

## 2017-02-07 PROCEDURE — 96375 TX/PRO/DX INJ NEW DRUG ADDON: CPT

## 2017-02-07 PROCEDURE — 93005 ELECTROCARDIOGRAM TRACING: CPT

## 2017-02-07 PROCEDURE — 99285 EMERGENCY DEPT VISIT HI MDM: CPT | Mod: 25,27

## 2017-02-07 PROCEDURE — 99215 OFFICE O/P EST HI 40 MIN: CPT | Mod: S$PBB,,, | Performed by: INTERNAL MEDICINE

## 2017-02-07 PROCEDURE — 25000003 PHARM REV CODE 250: Performed by: EMERGENCY MEDICINE

## 2017-02-07 RX ORDER — ONDANSETRON 2 MG/ML
4 INJECTION INTRAMUSCULAR; INTRAVENOUS
Status: COMPLETED | OUTPATIENT
Start: 2017-02-07 | End: 2017-02-07

## 2017-02-07 RX ORDER — SODIUM CHLORIDE 9 MG/ML
1000 INJECTION, SOLUTION INTRAVENOUS
Status: COMPLETED | OUTPATIENT
Start: 2017-02-07 | End: 2017-02-07

## 2017-02-07 RX ORDER — METOCLOPRAMIDE HYDROCHLORIDE 5 MG/ML
10 INJECTION INTRAMUSCULAR; INTRAVENOUS
Status: COMPLETED | OUTPATIENT
Start: 2017-02-07 | End: 2017-02-07

## 2017-02-07 RX ORDER — HYDROMORPHONE HYDROCHLORIDE 1 MG/ML
1 INJECTION, SOLUTION INTRAMUSCULAR; INTRAVENOUS; SUBCUTANEOUS
Status: COMPLETED | OUTPATIENT
Start: 2017-02-07 | End: 2017-02-07

## 2017-02-07 RX ADMIN — PIPERACILLIN AND TAZOBACTAM 4.5 G: 4; .5 INJECTION, POWDER, LYOPHILIZED, FOR SOLUTION INTRAVENOUS; PARENTERAL at 08:02

## 2017-02-07 RX ADMIN — SODIUM CHLORIDE 1000 ML: 0.9 INJECTION, SOLUTION INTRAVENOUS at 07:02

## 2017-02-07 RX ADMIN — METOCLOPRAMIDE 10 MG: 5 INJECTION, SOLUTION INTRAMUSCULAR; INTRAVENOUS at 07:02

## 2017-02-07 RX ADMIN — HYDROMORPHONE HYDROCHLORIDE 1 MG: 1 INJECTION, SOLUTION INTRAMUSCULAR; INTRAVENOUS; SUBCUTANEOUS at 10:02

## 2017-02-07 RX ADMIN — ONDANSETRON 4 MG: 2 INJECTION INTRAMUSCULAR; INTRAVENOUS at 10:02

## 2017-02-07 RX ADMIN — SODIUM CHLORIDE 1000 ML: 0.9 INJECTION, SOLUTION INTRAVENOUS at 04:02

## 2017-02-07 RX ADMIN — METOCLOPRAMIDE 10 MG: 5 INJECTION, SOLUTION INTRAMUSCULAR; INTRAVENOUS at 04:02

## 2017-02-07 NOTE — ED NOTES
"Presents to the ER with c/o dizziness that has been persistent for the past month, but reports worsening over the past week. "I feel like I am going to faint." Patient was told by oncologist to come to ER for evaluation and admission for bilirubin of 6 that is now 8. Patient has history of colon cancer; last chemo was 4-5 weeks ago. Associated complaints are nausea and vomiting. Patient has bilateral icterus. Mucous membranes are pink and moist. Skin is warm, dry and intact. Lungs are clear bilaterally, respirations are regular and unlabored. Denies cough, congestion, rhinorrhea or SOB. BS active x4, no tenderness with palpation, abd is soft and not distended. Denies any appetite or activity change. S1S2, capillary refill is < 2 seconds. Denies dysuria, difficulty urinating, frequency, numbness, tingling or weakness. TIARRA LANGS    "

## 2017-02-07 NOTE — TELEPHONE ENCOUNTER
Received call from Dr. Calzada with new orders to call pt and tell him to go to the ER now since she cannot get in touch with Dr. Reyna.  Called pts phone with no answer.  Called pts son, Berlin, and informed him to bring pt to the ER per Dr. Calzada since she cannot get in touch with Dr. Reyna, and tell the ER doctor to look at Dr. Calzada's note from today's office visit.  Pts son, Berlin, verbalizes understanding and appreciation.

## 2017-02-07 NOTE — TELEPHONE ENCOUNTER
----- Message from Jairo Lopez sent at 2/6/2017  9:41 AM CST -----  Contact: wife/[phone]  He needs a fu in 3 weeks.

## 2017-02-07 NOTE — MR AVS SNAPSHOT
[facility]  [address] [place] [address] Drive [address]  [place] [address]  Phone: [phone]                  Berlin Villa   2017 11:00 AM   Office Visit    Description:  Male : 1960   Provider:  Kaorline Calzada MD   Department:  [facility]           Reason for Visit     Follow-up                To Do List           Future Appointments        Provider Department Dept Phone    2017 11:15 AM [facility] [phone]    2017 2:40 PM KYM Gomez [facility] [phone]    2017 2:00 PM Josh Reyna MD [facility] [phone]    2017 1:30 PM Luis M Ruelas MD [facility] [phone]      Goals (5 Years of Data)     None      [facility]     [facility] Care [facility] -  Assistance  Registered nurses in the [facility] Center provide clinical advisement, health education, appointment booking, and other advisory services.  Call for this free service at [phone].             Medications           Message regarding Medications     Verify the changes and/or additions to your medication regime listed below are the same as discussed with your clinician today.  If any of these changes or additions are incorrect, please notify your healthcare provider.        STOP taking these medications     warfarin (COUMADIN) 2 MG tablet Take 1 tablet (2 mg total) by mouth Daily.           Verify that the below list of medications is an accurate representation of the medications you are currently taking.  If none reported, the list may be blank. If incorrect, please contact your healthcare provider. Carry this list with you in case of emergency.           Current Medications     alprazolam (XANAX) 0.25 MG tablet Take 1 tablet (0.25 mg total) by mouth 3 (three) times daily as needed for Anxiety.    amitriptyline (ELAVIL) 10 MG tablet Take 1 tablet (10 mg total) by  "mouth 2 (two) times daily.    lisinopril (PRINIVIL,ZESTRIL) 20 MG tablet Take 1 tablet (20 mg total) by mouth once daily.    ondansetron (ZOFRAN) 8 MG tablet Take 1 tablet (8 mg total) by mouth every 12 (twelve) hours as needed for Nausea.    oxycodone (ROXICODONE) 30 MG Tab Take 1 tablet (30 mg total) by mouth 4 (four) times daily.    pantoprazole (PROTONIX) 20 MG tablet Take 20 mg by mouth once daily.    prochlorperazine (COMPAZINE) 10 MG tablet Take 1 tablet (10 mg total) by mouth every 6 (six) hours as needed.           Clinical Reference Information           Your Vitals Were     BP Pulse Temp Height Weight BMI    94/57 121 97.7 °F (36.5 °C) 5' 11" (1.803 m) 95 kg (209 lb 7 oz) 29.21 kg/m2      Blood Pressure          Most Recent Value    BP  (!)  94/57      Allergies as of 2/7/2017     No Known Allergies      Immunizations Administered on Date of Encounter - 2/7/2017     None      Language Assistance Services     ATTENTION: Language assistance services are available, free of charge. Please call [phone].      ATENCIÓN: Si habla toshia, tiene a stahl disposición servicios gratuitos de asistencia lingüística. Llame al [phone].     [facility] Ý: N?u b?n nói Ti?ng Vi?t, có các d?ch v? h? tr? ngôn ng? mi?n phí dành cho b?n. G?i s? [phone].         [facility] complies with applicable Federal civil rights laws and does not discriminate on the basis of race, color, national origin, age, disability, or sex.        "

## 2017-02-07 NOTE — PROGRESS NOTES
Seen in follow up after hospitalization    This note was dictated with Dragon and briefly proofread. Please excuse any sentences that may be unclear or words misspelled    CHIEF COMPLAINT:  I'm weak and tired    ONCOLOGICAL HISTORY:  The patient was originally diagnosed with colorectal carcinoma   stage II or III in 2010.  He did receive Xeloda as adjuvant therapy.  In May of   2016, he was found to have a hepatic mass in the central right lobe as well as   posterior mediastinal celiac and retroperitoneal adenopathy with portacaval   adenopathy.  Biopsy of the liver lesion was performed on June 9th and findings   were consistent with metastatic adenocarcinoma from a suspected GI primary.  First cycle of FOLFOX plus AVASTIN was at the end of June 2016.  Patient has now completed 12 cycles of chemotherapy as January 3 of 2017  His scans had showed slight enlargement of the mass yet with decreased uptake thought to be necrosis. He developed hyperbilirubinemia with biliary obstruction    He has undergone biliary stent placement in [place] with Dr Reyna. He is here for routine follow up    HPI  Today patient is here with his brother who states pt is weak and not eating. His blood pressure is low yet pt states he just took some of his opioid medication for pain.   He is not having emesis, fever or night sweats. He reports that he is extremely tired    He is tolerating Zestril for hypertension and Protonix for GERD without any complications.      Current Outpatient Prescriptions:     alprazolam (XANAX) 0.25 MG tablet, Take 1 tablet (0.25 mg total) by mouth 3 (three) times daily as needed for Anxiety., Disp: 90 tablet, Rfl: 0    amitriptyline (ELAVIL) 10 MG tablet, Take 1 tablet (10 mg total) by mouth 2 (two) times daily., Disp: 60 tablet, Rfl: 1    lisinopril (PRINIVIL,ZESTRIL) 20 MG tablet, Take 1 tablet (20 mg total) by mouth once daily., Disp: 30 tablet, Rfl: 6    ondansetron (ZOFRAN) 8 MG tablet, Take 1 tablet (8 mg  "total) by mouth every 12 (twelve) hours as needed for Nausea., Disp: 30 tablet, Rfl: 2    oxycodone (ROXICODONE) 30 MG Tab, Take 1 tablet (30 mg total) by mouth 4 (four) times daily. (Patient taking differently: Take 30 mg by mouth every 6 (six) hours as needed (pain). ), Disp: 120 tablet, Rfl: 0    pantoprazole (PROTONIX) 20 MG tablet, Take 20 mg by mouth once daily., Disp: , Rfl: 3    prochlorperazine (COMPAZINE) 10 MG tablet, Take 1 tablet (10 mg total) by mouth every 6 (six) hours as needed., Disp: 30 tablet, Rfl: 1    REVIEW OF SYSTEMS:    GENERAL:  No fever or chills.  No unexpected change in weight.  HEENT:  No photophobia, rhinorrhea, sinus congestion, tinnitus, gingival   bleeding, oral ulcers, or sore throat.  RESPIRATORY:  No shortness of breath, cough, or wheezing.  CARDIOVASCULAR:  No chest pain, palpitations, or swelling of the lower   extremities.  GASTROINTESTINAL:  No abdominal pain, dysphagia, emesis, diarrhea, or   constipation.  No heartburn, abdominal distention, melena, or hematochezia.  GENITOURINARY:  No urinary frequency, hesitancy, dysuria, or hematuria.  RHEUM:  No arthralgias or joint swelling.  MUSCULOSKELETAL:  No neck pain,  chronic back pain,  noss.  NEUROLOGICAL:  No headaches, dizziness, or change in memory.  Positive paresthesias  PSYCH:  No agitation, change in behavior,  today is definitely anxious no depression   ENDOCRINE:  No hot  + cold intolerance.     PHYSICAL EXAMINATION:  Visit Vitals    BP (!) 94/57    Pulse (!) 121    Temp 97.7 °F (36.5 °C)    Ht 5' 11" (1.803 m)    Wt 95 kg (209 lb 7 oz)    BMI 29.21 kg/m2       GENERAL:  Gaunt in appearance and appears weak today, pale  PSYCH:  Flat affect.    HEENT:  Extraocular movements intact.  Conjunctivae pink.icteric sclera  NECK:  Supple.  Trachea midline.  No palpable abnormalities.  LYMPHATICS:  No cervical or axillary adenopathy.  CHEST:  Clear with no use of accessory muscles during respiration.  Heart with " tachycardia  ABDOMEN:  No distention  EXTREMITIES:  No cyanosis, clubbing, edema, or joint effusion.  NEUROLOGICAL:  Tired yet oriented  Cranial nerves grossly intact.  SKIN:  Warm, dry. Tenting of skin       Lab Results   Component Value Date    WBC 11.20 02/07/2017    HGB 15.4 02/07/2017    HCT 46.3 02/07/2017    MCV 96 02/07/2017     (L) 02/07/2017       CMP  Sodium   Date Value Ref Range Status   02/07/2017 130 (L) 136 - 145 mmol/L Final     Potassium   Date Value Ref Range Status   02/07/2017 4.1 3.5 - 5.1 mmol/L Final     Chloride   Date Value Ref Range Status   02/07/2017 98 95 - 110 mmol/L Final     CO2   Date Value Ref Range Status   02/07/2017 21 (L) 23 - 29 mmol/L Final     Glucose   Date Value Ref Range Status   02/07/2017 116 (H) 70 - 110 mg/dL Final     BUN, Bld   Date Value Ref Range Status   02/07/2017 17 6 - 20 mg/dL Final     Creatinine   Date Value Ref Range Status   02/07/2017 0.8 0.5 - 1.4 mg/dL Final     Calcium   Date Value Ref Range Status   02/07/2017 8.5 (L) 8.7 - 10.5 mg/dL Final     Total Protein   Date Value Ref Range Status   02/07/2017 5.8 (L) 6.0 - 8.4 g/dL Final     Albumin   Date Value Ref Range Status   02/07/2017 2.1 (L) 3.5 - 5.2 g/dL Final     Total Bilirubin   Date Value Ref Range Status   02/07/2017 8.2 (H) 0.1 - 1.0 mg/dL Final     Comment:     For infants and newborns, interpretation of results should be based  on gestational age, weight and in agreement with clinical  observations.  Premature Infant recommended reference ranges:  Up to 24 hours.............<8.0 mg/dL  Up to 48 hours............<12.0 mg/dL  3-5 days..................<15.0 mg/dL  6-29 days.................<15.0 mg/dL       Alkaline Phosphatase   Date Value Ref Range Status   02/07/2017 291 (H) 55 - 135 U/L Final     AST   Date Value Ref Range Status   02/07/2017 93 (H) 10 - 40 U/L Final     ALT   Date Value Ref Range Status   02/07/2017 33 10 - 44 U/L Final     Anion Gap   Date Value Ref Range Status    02/07/2017 11 8 - 16 mmol/L Final     eGFR if    Date Value Ref Range Status   02/07/2017 >60 >60 mL/min/1.73 m^2 Final     eGFR if non    Date Value Ref Range Status   02/07/2017 >60 >60 mL/min/1.73 m^2 Final     Comment:     Calculation used to obtain the estimated glomerular filtration  rate (eGFR) is the CKD-EPI equation. Since race is unknown   in our information system, the eGFR values for   -American and Non--American patients are given   for each creatinine result.             MRI Abdomen W WO Contrast 09/13/2016 met colon cancer          RESULTS:  Routine multiplanar imaging through this 56-year-old males liver was obtained with utilization of 10 cc of gadolinium postcontrast and comparison was made with 5/31/16     Within segment 8 of the liver there is again noted to be an apparent irregular mass that is ill-defined with a slightly larger T2 bright focus in this region spanning 2.7 x 2.6 cm on today's examination in comparison with 1 cm x 9 mm on the prior examination. The more diffuse faint T2 weighted signal abnormality measures measured on the prior does not appear significantly changed. The hypoenhancing region is not as easily defined as on the prior but appears similar in size at 3.5 x 2.5 cm in size on image 24 of sequence 1403. Distal to this mass region there appears to be thrombosis of the portal vein. Altered perfusion of the right lobe of the liver is noted distal to this mass region. The degree of biliary dilatation seen on the prior appears decreased and the degree of perfusion abnormality appears decreased        A prominent lymph node is noted superior to the pancreatic head adjacent to the inferior vena cava a 3.0 x 1.5 cm suggestive of lymphadenopathy unchanged     Multiple renal signal intensities are noted one at the upper pole and the left and one at the upper pole of the right without obvious evidence of enhancement suggestive of renal  cysts.     Some dependent signal intensity is noted layering within the gallbladder suggestive of biliary sludge or stones. Ultrasound could be performed for confirmation if desired.  IMPRESSION:         1. Overall there appears to be some improvement since 5/31/16 with slightly less perfusion abnormality and biliary dilatation identified. The ill-defined mass is again poorly defined and measured but is suspected to measures similarly on the postcontrast sequences with a slightly larger region of increased T2 signal centrally then on the prior exam. This could relate to necrosis and is of uncertain etiology. 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 He needs admit to the hospital and I was trying to avoid an admission to [facility] with then transport to [facility] or [facility].   Pt understands that I need further tissue regarding the hepatic mass. Prior biopsy was consistent with a gI primary yet not enough tissue was available for egfr testing and kras etc. Pt is tired but would like to be aggressive as far as treatment if possible  Given his jump in bilirubin he likely needs revision of his biliary stent  Hepatic surgeon will be consulted to see if patient could withstand open biopsy of his hepatic mass for further tissue for evaluation  Pt needs IVFs and urgent eval by GI  I wrote out the diagnosis and explained his scan results on a piece of paper for his elderly mother.     His last chemotherapy was on  January 3 [facility] which according to the records he tolerated well.  He has been taking his usual pain medication around the clock to help with his back pain.  He remains on low-dose Coumadin for his portal vein thrombosis  History continue his antidepressants are helping with his psych disorder    Cont PPI for GERD  Cont opioids and elavil    Will send pt  To [facility] ER for evaluation

## 2017-02-07 NOTE — ED PROVIDER NOTES
"Encounter Date: 2/7/2017    SCRIBE #1 NOTE: I, Fern Castro, am scribing for, and in the presence of, Dr. Sanchez.       History     Chief Complaint   Patient presents with    Dizziness     Hx colon CA with apparent recent reoccurance per patient - told by oncologist to "come get admitted"    Fatigue     Review of patient's allergies indicates:  No Known Allergies  HPI Comments: 02/07/2017  3:48 PM     Chief Complaint: Generalized Weakness       The patient is a 56 y.o. male with a PMHx of arthritis; cancer; cataract; metastatic colon cancer; degenerative disc disease; foot pain; GERD; and HTN who is presenting with an acute onset of generalized weakness that started 1 mth ago and worsened 1 wk ago. Pt also c/o feeling like "he was going to faint" recently, but he denied any recent episodes of syncope. He started to c/o nausea and vomiting today. Pt had a routine appointment with his oncologist today and he was sent to the ED for further evaluation since his bilirubin level went from a 2 4 days ago to an 8 today. His most recent chemotherapy treatment was 4-5 wks ago. Pt reported a hx of biliary stent placement. No fever. No diarrhea, blood in stool, or black tarry stool. Pt has a past surgical history that includes Colon surgery and Cataract extraction.      The history is provided by the patient and medical records.     Past Medical History   Diagnosis Date    Arthritis      sacroilitis    Cancer 1999     colon cancer    Cataract     Colon cancer     Degenerative disc disease     Foot pain     GERD (gastroesophageal reflux disease)     Hypertension      No past medical history pertinent negatives.  Past Surgical History   Procedure Laterality Date    Colon surgery      Cataract extraction Bilateral      Family History   Problem Relation Age of Onset    Cancer Mother      ovarian    Cancer Father      bladder    COPD Brother     Cancer Brother      lung    Diabetes Brother     Cancer Paternal " "Grandfather      Social History   Substance Use Topics    Smoking status: Former Smoker     Packs/day: 0.50     Years: 2.00    Smokeless tobacco: Former User      Comment: smokless tobacco when a teenager     Alcohol use No     Review of Systems   Constitutional: Negative for fever.   HENT: Negative for sore throat.    Eyes: Negative for visual disturbance.   Respiratory: Negative for shortness of breath.    Cardiovascular: Negative for chest pain.   Gastrointestinal: Positive for nausea and vomiting. Negative for blood in stool and diarrhea.        No black tarry stool.   Genitourinary: Negative for dysuria.   Musculoskeletal: Negative for back pain.   Skin: Negative for rash.   Neurological: Positive for weakness (Generalized weakness.).        +Feeling like "he was going to faint," but denied any episodes of syncope.   Hematological: Does not bruise/bleed easily.       Physical Exam   Initial Vitals   BP Pulse Resp Temp SpO2   02/07/17 1520 02/07/17 1520 02/07/17 1520 02/07/17 1520 02/07/17 1520   93/61 115 16 97.6 °F (36.4 °C) 94 %     Physical Exam    Nursing note and vitals reviewed.  Constitutional: He appears well-developed and well-nourished.   HENT:   Head: Normocephalic and atraumatic.   Mouth/Throat: Oropharynx is clear and moist.   Eyes: Scleral icterus is present.   Neck: Neck supple.   Cardiovascular: Normal rate, regular rhythm, normal heart sounds and intact distal pulses. Exam reveals no gallop and no friction rub.    No murmur heard.  Pulmonary/Chest: Breath sounds normal. He has no wheezes. He has no rhonchi. He has no rales.   Abdominal: Soft. He exhibits no distension. There is hepatomegaly. A hernia is present. Hernia confirmed positive in the ventral area (Reducible ventral hernia.).   Musculoskeletal: Normal range of motion.   Neurological: He is alert and oriented to person, place, and time.   Skin: No rash noted. No erythema.   Psychiatric: He has a normal mood and affect.         ED " Course   Critical Care  Date/Time: 2/8/2017 8:57 AM  Performed by: MOOSE WRIGHT III  Authorized by: MOOSE WRIGHT III   Direct patient critical care time: 120 minutes  Additional history critical care time: 10 minutes  Documentation critical care time: 10 minutes  Total critical care time (exclusive of procedural time) : 140 minutes  Critical care was necessary to treat or prevent imminent or life-threatening deterioration of the following conditions: circulatory failure.  Critical care was time spent personally by me on the following activities: pulse oximetry, obtaining history from patient or surrogate, re-evaluation of patient's condition, ordering and performing treatments and interventions, examination of patient, review of old charts, ordering and review of laboratory studies and evaluation of patient's response to treatment.  Subsequent provider of critical care: I assumed direction of critical care for this patient from another provider of my specialty.        Labs Reviewed - No data to display  EKG Readings: (Independently Interpreted)   Rhythm: Sinus Tachycardia. Heart Rate: 105. Ectopy: No Ectopy. Conduction: Normal. ST Segments: Non-Specific ST Segment Depression. Axis: Normal. Clinical Impression: Sinus Tachycardia          Medical Decision Making:   History:   Old Records Summarized: records from clinic visits and records from previous admission(s).  Clinical Tests:   Lab Tests: Ordered and Reviewed  The following lab test(s) were unremarkable: CBC and CMP  ED Management:  Berlin Villa is a 56 y.o. male who presents with  generalized weakness and rapidly rising bilirubin.  He had a very stent placed a week ago and now has a bilirubin which is steadily increased from 2 up to 8.2 today.  During the ED visit he is tachycardic and has a systolic pressure of 88 which is somewhat fluid responsive.  After lengthy discussion with Dr. Reyna, and determination is made to transfer the patient to  [place] for consideration of additional stenting.  He raises concerns of cholangitis as a rise in a bilirubin and is empirically treated with Zofran.            Scribe Attestation:   Scribe #1: I performed the above scribed service and the documentation accurately describes the services I performed. I attest to the accuracy of the note.    Attending Attestation:           Physician Attestation for Scribe:  Physician Attestation Statement for Scribe #1: I, Dr. Sanchez, reviewed documentation, as scribed by Fern Castro in my presence, and it is both accurate and complete.                 ED Course     Clinical Impression:   The primary encounter diagnosis was Obstructive jaundice. Diagnoses of Dehydration and Hypotension, unspecified hypotension type were also pertinent to this visit.          Eduardo Sanchez III, MD  02/08/17 1496

## 2017-02-08 ENCOUNTER — HOSPITAL ENCOUNTER (INPATIENT)
Facility: HOSPITAL | Age: 57
LOS: 7 days | Discharge: HOME | DRG: 871 | End: 2017-02-15
Attending: HOSPITALIST | Admitting: HOSPITALIST
Payer: MEDICAID

## 2017-02-08 ENCOUNTER — TELEPHONE (OUTPATIENT)
Dept: HEMATOLOGY/ONCOLOGY | Facility: CLINIC | Age: 57
End: 2017-02-08

## 2017-02-08 DIAGNOSIS — E80.6 HYPERBILIRUBINEMIA: ICD-10-CM

## 2017-02-08 DIAGNOSIS — C77.2 MALIGNANT NEOPLASM METASTATIC TO INTRA-ABDOMINAL LYMPH NODE: Primary | Chronic | ICD-10-CM

## 2017-02-08 DIAGNOSIS — K83.09 ACUTE CHOLANGITIS: ICD-10-CM

## 2017-02-08 PROBLEM — Z98.890 HISTORY OF BILIARY DUCT STENT PLACEMENT: Status: ACTIVE | Noted: 2017-02-03

## 2017-02-08 LAB
ALBUMIN SERPL BCP-MCNC: 1.7 G/DL
ALP SERPL-CCNC: 247 U/L
ALT SERPL W/O P-5'-P-CCNC: 26 U/L
ANION GAP SERPL CALC-SCNC: 9 MMOL/L
AST SERPL-CCNC: 63 U/L
BASOPHILS # BLD AUTO: 0 K/UL
BASOPHILS NFR BLD: 0 %
BILIRUB DIRECT SERPL-MCNC: 4.3 MG/DL
BILIRUB SERPL-MCNC: 5.6 MG/DL
BUN SERPL-MCNC: 20 MG/DL
CALCIUM SERPL-MCNC: 7.7 MG/DL
CHLORIDE SERPL-SCNC: 100 MMOL/L
CO2 SERPL-SCNC: 21 MMOL/L
CREAT SERPL-MCNC: 0.7 MG/DL
DIFFERENTIAL METHOD: ABNORMAL
EOSINOPHIL # BLD AUTO: 0 K/UL
EOSINOPHIL NFR BLD: 0.5 %
ERYTHROCYTE [DISTWIDTH] IN BLOOD BY AUTOMATED COUNT: 16 %
EST. GFR  (AFRICAN AMERICAN): >60 ML/MIN/1.73 M^2
EST. GFR  (NON AFRICAN AMERICAN): >60 ML/MIN/1.73 M^2
GLUCOSE SERPL-MCNC: 98 MG/DL
HCT VFR BLD AUTO: 40.6 %
HGB BLD-MCNC: 13.9 G/DL
INR PPP: 1.8
LYMPHOCYTES # BLD AUTO: 1.3 K/UL
LYMPHOCYTES NFR BLD: 16.2 %
MCH RBC QN AUTO: 32.9 PG
MCHC RBC AUTO-ENTMCNC: 34.2 %
MCV RBC AUTO: 96 FL
MONOCYTES # BLD AUTO: 1.3 K/UL
MONOCYTES NFR BLD: 15.3 %
NEUTROPHILS # BLD AUTO: 5.6 K/UL
NEUTROPHILS NFR BLD: 68 %
PLATELET # BLD AUTO: 106 K/UL
PLATELET BLD QL SMEAR: ABNORMAL
PMV BLD AUTO: 10.4 FL
POTASSIUM SERPL-SCNC: 3.8 MMOL/L
PROT SERPL-MCNC: 4.8 G/DL
PROTHROMBIN TIME: 19.1 SEC
RBC # BLD AUTO: 4.23 M/UL
SODIUM SERPL-SCNC: 130 MMOL/L
WBC # BLD AUTO: 8.29 K/UL

## 2017-02-08 PROCEDURE — 99232 SBSQ HOSP IP/OBS MODERATE 35: CPT | Mod: ,,, | Performed by: INTERNAL MEDICINE

## 2017-02-08 PROCEDURE — 94761 N-INVAS EAR/PLS OXIMETRY MLT: CPT

## 2017-02-08 PROCEDURE — 25000003 PHARM REV CODE 250: Performed by: PHYSICIAN ASSISTANT

## 2017-02-08 PROCEDURE — 25000003 PHARM REV CODE 250: Performed by: HOSPITALIST

## 2017-02-08 PROCEDURE — 80076 HEPATIC FUNCTION PANEL: CPT

## 2017-02-08 PROCEDURE — 85610 PROTHROMBIN TIME: CPT

## 2017-02-08 PROCEDURE — 63600175 PHARM REV CODE 636 W HCPCS: Performed by: NURSE PRACTITIONER

## 2017-02-08 PROCEDURE — 80048 BASIC METABOLIC PNL TOTAL CA: CPT

## 2017-02-08 PROCEDURE — 85025 COMPLETE CBC W/AUTO DIFF WBC: CPT

## 2017-02-08 PROCEDURE — 11000001 HC ACUTE MED/SURG PRIVATE ROOM

## 2017-02-08 PROCEDURE — 25000003 PHARM REV CODE 250: Performed by: NURSE PRACTITIONER

## 2017-02-08 PROCEDURE — 36415 COLL VENOUS BLD VENIPUNCTURE: CPT

## 2017-02-08 RX ORDER — AMITRIPTYLINE HYDROCHLORIDE 10 MG/1
10 TABLET, FILM COATED ORAL 2 TIMES DAILY
Status: DISCONTINUED | OUTPATIENT
Start: 2017-02-08 | End: 2017-02-15 | Stop reason: HOSPADM

## 2017-02-08 RX ORDER — SODIUM CHLORIDE 9 MG/ML
INJECTION, SOLUTION INTRAVENOUS CONTINUOUS
Status: DISCONTINUED | OUTPATIENT
Start: 2017-02-08 | End: 2017-02-11

## 2017-02-08 RX ORDER — RAMELTEON 8 MG/1
8 TABLET ORAL NIGHTLY PRN
Status: DISCONTINUED | OUTPATIENT
Start: 2017-02-08 | End: 2017-02-15 | Stop reason: HOSPADM

## 2017-02-08 RX ORDER — WARFARIN 2 MG/1
2 TABLET ORAL DAILY
Status: DISCONTINUED | OUTPATIENT
Start: 2017-02-08 | End: 2017-02-09

## 2017-02-08 RX ORDER — OXYCODONE HYDROCHLORIDE 5 MG/1
30 TABLET ORAL 4 TIMES DAILY
Status: DISCONTINUED | OUTPATIENT
Start: 2017-02-08 | End: 2017-02-08 | Stop reason: SDUPTHER

## 2017-02-08 RX ORDER — ENOXAPARIN SODIUM 100 MG/ML
40 INJECTION SUBCUTANEOUS EVERY 24 HOURS
Status: DISCONTINUED | OUTPATIENT
Start: 2017-02-08 | End: 2017-02-08

## 2017-02-08 RX ORDER — ONDANSETRON 8 MG/1
8 TABLET, ORALLY DISINTEGRATING ORAL EVERY 8 HOURS PRN
Status: DISCONTINUED | OUTPATIENT
Start: 2017-02-08 | End: 2017-02-15 | Stop reason: HOSPADM

## 2017-02-08 RX ORDER — ALPRAZOLAM 0.25 MG/1
0.25 TABLET ORAL 3 TIMES DAILY PRN
Status: DISCONTINUED | OUTPATIENT
Start: 2017-02-08 | End: 2017-02-15 | Stop reason: HOSPADM

## 2017-02-08 RX ORDER — MORPHINE SULFATE 2 MG/ML
2 INJECTION, SOLUTION INTRAMUSCULAR; INTRAVENOUS EVERY 4 HOURS PRN
Status: DISCONTINUED | OUTPATIENT
Start: 2017-02-08 | End: 2017-02-15 | Stop reason: HOSPADM

## 2017-02-08 RX ORDER — AMOXICILLIN 250 MG
1 CAPSULE ORAL 2 TIMES DAILY
Status: DISCONTINUED | OUTPATIENT
Start: 2017-02-08 | End: 2017-02-15 | Stop reason: HOSPADM

## 2017-02-08 RX ORDER — PANTOPRAZOLE SODIUM 20 MG/1
20 TABLET, DELAYED RELEASE ORAL DAILY
Status: DISCONTINUED | OUTPATIENT
Start: 2017-02-08 | End: 2017-02-15 | Stop reason: HOSPADM

## 2017-02-08 RX ORDER — BISACODYL 10 MG
10 SUPPOSITORY, RECTAL RECTAL DAILY PRN
Status: DISCONTINUED | OUTPATIENT
Start: 2017-02-08 | End: 2017-02-10

## 2017-02-08 RX ORDER — OXYCODONE HYDROCHLORIDE 5 MG/1
30 TABLET ORAL 4 TIMES DAILY
Status: DISCONTINUED | OUTPATIENT
Start: 2017-02-08 | End: 2017-02-09

## 2017-02-08 RX ORDER — MORPHINE SULFATE 2 MG/ML
4 INJECTION, SOLUTION INTRAMUSCULAR; INTRAVENOUS EVERY 4 HOURS PRN
Status: DISCONTINUED | OUTPATIENT
Start: 2017-02-08 | End: 2017-02-14

## 2017-02-08 RX ORDER — ACETAMINOPHEN 325 MG/1
650 TABLET ORAL EVERY 6 HOURS PRN
Status: DISCONTINUED | OUTPATIENT
Start: 2017-02-08 | End: 2017-02-15 | Stop reason: HOSPADM

## 2017-02-08 RX ADMIN — SODIUM CHLORIDE: 0.9 INJECTION, SOLUTION INTRAVENOUS at 01:02

## 2017-02-08 RX ADMIN — OXYCODONE HYDROCHLORIDE 30 MG: 5 TABLET ORAL at 05:02

## 2017-02-08 RX ADMIN — ENOXAPARIN SODIUM 40 MG: 100 INJECTION SUBCUTANEOUS at 11:02

## 2017-02-08 RX ADMIN — ONDANSETRON 8 MG: 8 TABLET, ORALLY DISINTEGRATING ORAL at 08:02

## 2017-02-08 RX ADMIN — ONDANSETRON 8 MG: 8 TABLET, ORALLY DISINTEGRATING ORAL at 01:02

## 2017-02-08 RX ADMIN — OXYCODONE HYDROCHLORIDE 30 MG: 5 TABLET ORAL at 12:02

## 2017-02-08 RX ADMIN — RAMELTEON 8 MG: 8 TABLET, FILM COATED ORAL at 01:02

## 2017-02-08 RX ADMIN — PANTOPRAZOLE SODIUM 20 MG: 20 TABLET, DELAYED RELEASE ORAL at 08:02

## 2017-02-08 RX ADMIN — MORPHINE SULFATE 4 MG: 2 INJECTION, SOLUTION INTRAMUSCULAR; INTRAVENOUS at 01:02

## 2017-02-08 RX ADMIN — PIPERACILLIN SODIUM AND TAZOBACTAM SODIUM 4.5 G: 4; .5 INJECTION, POWDER, FOR SOLUTION INTRAVENOUS at 11:02

## 2017-02-08 RX ADMIN — AMITRIPTYLINE HYDROCHLORIDE 10 MG: 10 TABLET, FILM COATED ORAL at 08:02

## 2017-02-08 RX ADMIN — PIPERACILLIN SODIUM AND TAZOBACTAM SODIUM 4.5 G: 4; .5 INJECTION, POWDER, FOR SOLUTION INTRAVENOUS at 03:02

## 2017-02-08 RX ADMIN — MORPHINE SULFATE 2 MG: 2 INJECTION, SOLUTION INTRAMUSCULAR; INTRAVENOUS at 08:02

## 2017-02-08 RX ADMIN — SODIUM CHLORIDE: 0.9 INJECTION, SOLUTION INTRAVENOUS at 07:02

## 2017-02-08 RX ADMIN — WARFARIN SODIUM 2 MG: 2 TABLET ORAL at 05:02

## 2017-02-08 RX ADMIN — PIPERACILLIN SODIUM AND TAZOBACTAM SODIUM 4.5 G: 4; .5 INJECTION, POWDER, FOR SOLUTION INTRAVENOUS at 07:02

## 2017-02-08 NOTE — CONSULTS
"[facility]    CC: hyperbilirubinemia    HPI 56 y.o. male with metastatic colon cancer with biliary stricture s/p stent placed 2/4 by Dr. Reyna.  He returned to the ED yesterday for abdominal pain and general malaise and was found to have a persistent hyperbilirubinemia.  He denies any fevers, worsening jaundice, or any other associated complaints.      Past Medical History   Diagnosis Date    Arthritis      sacroilitis    Cancer 1999     colon cancer    Cataract     Colon cancer     Degenerative disc disease     Foot pain     GERD (gastroesophageal reflux disease)     Hypertension          Review of Systems  General ROS: negative for chills, fever or weight loss  Cardiovascular ROS: no chest pain or dyspnea on exertion  Gastrointestinal ROS: positive for abdominal pain, nausea    Physical Examination  Visit Vitals    /65 (BP Location: Right arm, Patient Position: Lying, BP Method: Automatic)    Pulse 97    Temp 98.1 °F (36.7 °C) (Oral)    Resp 18    Ht 5' 11" (1.803 m)    Wt 89.8 kg (198 lb)    SpO2 (!) 94%    BMI 27.62 kg/m2     General appearance: alert, cooperative, no distress, jaundiced  HENT: Normocephalic, atraumatic, neck symmetrical, no nasal discharge   Lungs: clear to auscultation bilaterally, no dullness to percussion bilaterally  Heart: regular rate and rhythm without rub; no displacement of the PMI   Abdomen: soft, mildly tender; bowel sounds normoactive; no organomegaly  Extremities: extremities symmetric; no clubbing, cyanosis, or edema  Neurologic: Alert and oriented X 3, normal strength, normal coordination and gait    Labs:  CMP  Sodium   Date Value Ref Range Status   02/08/2017 130 (L) 136 - 145 mmol/L Final     Potassium   Date Value Ref Range Status   02/08/2017 3.8 3.5 - 5.1 mmol/L Final     Chloride   Date Value Ref Range Status   02/08/2017 100 95 - 110 mmol/L Final     CO2   Date Value Ref Range Status   02/08/2017 21 (L) 23 - 29 mmol/L Final     Glucose "   Date Value Ref Range Status   02/08/2017 98 70 - 110 mg/dL Final     BUN, Bld   Date Value Ref Range Status   02/08/2017 20 6 - 20 mg/dL Final     Creatinine   Date Value Ref Range Status   02/08/2017 0.7 0.5 - 1.4 mg/dL Final     Calcium   Date Value Ref Range Status   02/08/2017 7.7 (L) 8.7 - 10.5 mg/dL Final     Total Protein   Date Value Ref Range Status   02/08/2017 4.8 (L) 6.0 - 8.4 g/dL Final     Albumin   Date Value Ref Range Status   02/08/2017 1.7 (L) 3.5 - 5.2 g/dL Final     Total Bilirubin   Date Value Ref Range Status   02/08/2017 5.6 (H) 0.1 - 1.0 mg/dL Final     Comment:     For infants and newborns, interpretation of results should be based  on gestational age, weight and in agreement with clinical  observations.  Premature Infant recommended reference ranges:  Up to 24 hours.............<8.0 mg/dL  Up to 48 hours............<12.0 mg/dL  3-5 days..................<15.0 mg/dL  6-29 days.................<15.0 mg/dL       Alkaline Phosphatase   Date Value Ref Range Status   02/08/2017 247 (H) 55 - 135 U/L Final     AST   Date Value Ref Range Status   02/08/2017 63 (H) 10 - 40 U/L Final     ALT   Date Value Ref Range Status   02/08/2017 26 10 - 44 U/L Final     Anion Gap   Date Value Ref Range Status   02/08/2017 9 8 - 16 mmol/L Final     eGFR if    Date Value Ref Range Status   02/08/2017 >60 >60 mL/min/1.73 m^2 Final     eGFR if non    Date Value Ref Range Status   02/08/2017 >60 >60 mL/min/1.73 m^2 Final     Comment:     Calculation used to obtain the estimated glomerular filtration  rate (eGFR) is the CKD-EPI equation. Since race is unknown   in our information system, the eGFR values for   -American and Non--American patients are given   for each creatinine result.           Imaging: MRCP  1. Right hepatic lobe mass, with apparent interval enlargement. Associated biliary obstruction has mildly worsened. Occlusion of the right portal vein is also  present as before, with evidence of portal hypertension including splenomegaly and new, small volume ascites.  2. Moderate gallbladder wall edema. This is most finding as is commonly seen in the setting of ascites.  3. Enlarged peripancreatic lymph node, mildly increased in size.    Assessment:   56 year old male s/p stent for biliary stricture here with malaise and persistent hyperbilirubinemia, although down from yesterday.  Suspect mild cholangitis but no evidence for stent occlusion.  Continue antibiotics and trend liver tests but no role for ERCP at this time.    Plan:  1. Continue Zosyn and routine supportive care  2. Trend liver tests  3. No role for ERCP at this time.    BARRETT Reeves MD

## 2017-02-08 NOTE — IP AVS SNAPSHOT
[facility]  [address]  [place] [address]  Phone: [phone]           Patient Discharge Instructions     Our goal is to set you up for success. This packet includes information on your condition, medications, and your home care. It will help you to care for yourself so you don't get sicker and need to go back to the hospital.     Please ask your nurse if you have any questions.        There are many details to remember when preparing to leave the hospital. Here is what you will need to do:    1. Take your medicine. If you are prescribed medications, review your Medication List in the following pages. You may have new medications to  at the pharmacy and others that you'll need to stop taking. Review the instructions for how and when to take your medications. Talk with your doctor or nurses if you are unsure of what to do.     2. Go to your follow-up appointments. Specific follow-up information is listed in the following pages. Your may be contacted by a transition nurse or clinical provider about future appointments. Be sure we have all of the phone numbers to reach you, if needed. Please contact your provider's office if you are unable to make an appointment.     3. Watch for warning signs. Your doctor or nurse will give you detailed warning signs to watch for and when to call for assistance. These instructions may also include educational information about your condition. If you experience any of warning signs to your health, call your doctor.               [facility]  Unless otherwise directed by your provider, please contact [facility], our nurse care line that is available for 24/7 assistance.     [phone] (toll-free)    Registered nurses in the [facility] provide clinical advisement, health education, appointment booking, and other advisory services.                    ** Verify the list of medication(s) below is accurate and up to date. Carry this  with you in case of emergency. If your medications have changed, please notify your healthcare provider.             Medication List      START taking these medications        Additional Info                      fentaNYL 50 mcg/hr   Commonly known as:  DURAGESIC   Quantity:  10 patch   Refills:  0   Dose:  1 patch    Last time this was given:  1 patch on 2/15/2017  9:17 AM   Instructions:  Place 1 patch onto the skin every 72 hours.     Begin Date    AM    Noon    PM    Bedtime       morphine 10 mg/5 mL solution   Refills:  0   Dose:  15 mg    Instructions:  Take 7.5 mLs (15 mg total) by mouth every 3 (three) hours as needed for Pain.     Begin Date    AM    Noon    PM    Bedtime         CHANGE how you take these medications        Additional Info                      oxycodone 30 MG Tab   Commonly known as:  ROXICODONE   Quantity:  120 tablet   Refills:  0   Dose:  30 mg   What changed:    - when to take this  - reasons to take this    Last time this was given:  30 mg on 2/11/2017  8:27 PM   Instructions:  Take 1 tablet (30 mg total) by mouth 4 (four) times daily.     Begin Date    AM    Noon    PM    Bedtime         CONTINUE taking these medications        Additional Info                      alprazolam 0.25 MG tablet   Commonly known as:  XANAX   Quantity:  90 tablet   Refills:  0   Dose:  0.25 mg    Last time this was given:  0.25 mg on 2/13/2017  6:30 AM   Instructions:  Take 1 tablet (0.25 mg total) by mouth 3 (three) times daily as needed for Anxiety.     Begin Date    AM    Noon    PM    Bedtime       amitriptyline 10 MG tablet   Commonly known as:  ELAVIL   Quantity:  60 tablet   Refills:  1   Dose:  10 mg    Last time this was given:  10 mg on 2/15/2017  9:19 AM   Instructions:  Take 1 tablet (10 mg total) by mouth 2 (two) times daily.     Begin Date    AM    Noon    PM    Bedtime       ondansetron 8 MG tablet   Commonly known as:  ZOFRAN   Quantity:  30 tablet   Refills:  2   Dose:  8 mg    Instructions:   Take 1 tablet (8 mg total) by mouth every 12 (twelve) hours as needed for Nausea.     Begin Date    AM    Noon    PM    Bedtime       pantoprazole 20 MG tablet   Commonly known as:  PROTONIX   Refills:  3   Dose:  20 mg    Last time this was given:  20 mg on 2/15/2017  9:19 AM   Instructions:  Take 20 mg by mouth once daily.     Begin Date    AM    Noon    PM    Bedtime       prochlorperazine 10 MG tablet   Commonly known as:  COMPAZINE   Quantity:  30 tablet   Refills:  1   Dose:  10 mg    Instructions:  Take 1 tablet (10 mg total) by mouth every 6 (six) hours as needed.     Begin Date    AM    Noon    PM    Bedtime         STOP taking these medications     lisinopril 20 MG tablet   Commonly known as:  PRINIVIL,ZESTRIL            Where to Get Your Medications      Information about where to get these medications is not yet available     ! Ask your nurse or doctor about these medications     fentaNYL 50 mcg/hr    morphine 10 mg/5 mL solution                  Please bring to all follow up appointments:    1. A copy of your discharge instructions.  2. All medicines you are currently taking in their original bottles.  3. Identification and insurance card.    Please arrive 15 minutes ahead of scheduled appointment time.    Please call 24 hours in advance if you must reschedule your appointment and/or time.        Your Scheduled Appointments     Sep 21, 2017  1:30 PM CDT   Established Patient Visit with Luis M Ruelas MD   [facility]    [address]  [place] [id number] [phone]              Follow-up Information     Follow up with Karoline Calzada MD.    Specialty:  Hematology and Oncology    Why:  As needed    Contact information:    [address] [place] [address]   [address]  [place] [address]  [phone]          Discharge Instructions     Future Orders    Activity as tolerated     Diet general     Questions:    Total calories:      Fat restriction, if any:      Protein restriction, if any:  "     Na restriction, if any:      Fluid restriction:      Additional restrictions:          Discharge Instructions       ASCITES (ENGLISH) View Edit Remove  TOTAL BILIRUBIN (BLOOD) (ENGLISH) View Edit Remove  HYPONATREMIA (ENGLISH) View Edit Remove  URINARY RETENTION, MALE (ENGLISH) View Edit Remove  KOENIG CATHETER, CARE (ENGLISH) View Edit Remove  ERCP, DISCHARGE INSTRUCTIONS FOR (ENGLISH) View Edit Remove  FENTANYL SKIN PATCH (ENGLISH) View Edit Remove  MORPHINE ORAL SOLUTION (ENGLISH) View Edit Remove        Discharge References/Attachments     ASCITES (ENGLISH)    TOTAL BILIRUBIN (BLOOD) (ENGLISH)    HYPONATREMIA (ENGLISH)    URINARY RETENTION, MALE (ENGLISH)    KOENIG CATHETER, CARE (ENGLISH)    ERCP, DISCHARGE INSTRUCTIONS FOR (ENGLISH)    FENTANYL SKIN PATCH (ENGLISH)    MORPHINE ORAL SOLUTION (ENGLISH)        Primary Diagnosis     Your primary diagnosis was:  Inflammation Of Bile Ducts      Admission Information     Date & Time Provider Department CSN    2/8/2017 12:32 AM Noam Hansen MD [facility] [id number]      Care Providers     Provider Role Specialty Primary office phone    Noam Hansen MD Attending Provider Hospitalist [phone]    Josh Reyna MD Consulting Physician  Gastroenterology [phone]      Your Vitals Were     BP Pulse Temp Resp Height Weight    116/70 (BP Location: Right arm, Patient Position: Lying, BP Method: Automatic) 105 97.6 °F (36.4 °C) (Oral) 20 5' 11" (1.803 m) 89.8 kg (198 lb)    SpO2 BMI             92% 27.62 kg/m2         Recent Lab Values        12/3/2012                           9:24 AM           A1C 5.5                       Allergies as of 2/15/2017     No Known Allergies      Advance Directives     An advance directive is a document which, in the event you are no longer able to make decisions for yourself, tells your healthcare team what kind of treatment you do or do not want to receive, or who you would like to make those decisions " for you.  If you do not currently have an advance directive, [facility] encourages you to create one.  For more information call:  [phone]), [phone] ([phone]),  or log on to [email or web address].        Smoking Cessation     If you would like to quit smoking:   You may be eligible for free services if you are a [place] resident and started smoking cigarettes before September 1, 1988.  Call the Smoking Cessation Trust (SCT) toll free at (786) 188-5818 or (002) 316-4602.   Call 3-690-QUIT-NOW if you do not meet the above criteria.            Language Assistance Services     ATTENTION: Language assistance services are available, free of charge. Please call [phone].      ATENCIÓN: Si habla toshia, tiene a stahl disposición servicios gratuitos de asistencia lingüística. Llame al [phone].     CHÚ Ý: N?u b?n nói Ti?ng Vi?t, có các d?ch v? h? tr? ngôn ng? mi?n phí dành cho b?n. G?i s? [phone].         [facility] complies with applicable Federal civil rights laws and does not discriminate on the basis of race, color, national origin, age, disability, or sex.

## 2017-02-08 NOTE — PLAN OF CARE
02/08/17 1143   Readmission Questionnaire   At the time of your discharge, did someone talk to you about what your health problems were? Yes   At the time of discharge, did someone talk to you about what to watch out for regarding worsening of your health problem? Yes   At the time of discharge, did someone talk to you about what to do if you experienced worsening of your health problem? Yes   At the time of discharge, did someone talk to you about which medication to take when you left the hospital and which ones to stop taking? Yes   At the time of discharge, did someone talk to you about when and where to follow up with a doctor after you left the hospital? Yes   What do you believe caused you to be sick enough to be re-admitted? need another stent- nauseated, throwing up, weakness   How often do you need to have someone help you when you read instructions, pamphlets, or other written material from your doctor or pharmacy? Always   Do you have problems taking your medications as prescribed? No   Do you have any problems affording any of  your prescribed medications? No   Do you have problems obtaining/receiving your medications? No   Does the patient have transportation to healthcare appointments? Yes   Lives With alone   Living Arrangements mobile home   Does the patient have family/friends to help with healtcare needs after discharge? yes   Who are your caregiver(s) and their phone number(s)? son/ uncle   Does your caregiver provide all the help you need? Yes   Are you currently feeling confused? Yes   Are you currently having problems thinking? Yes   Have you felt down, depressed, or hopeless? 1   Have you felt little interest or pleasure in doing things? 1   In the last 7 days, my sleep quality was: poor

## 2017-02-08 NOTE — ASSESSMENT & PLAN NOTE
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  Continue to f/u with heme/onc.

## 2017-02-08 NOTE — ASSESSMENT & PLAN NOTE
"Abnormal liver enzymes  Elevated bilirubin  Hyponatremia   Tbili 5.6 today, down from 8.2 on admission.  Decreased in LFTs this morning.  Seen by GI who has no intervention planned: "Suspect mild cholangitis but no evidence for stent occlusion. Continue antibiotics and trend liver tests but no role for ERCP at this time."     Continue IVFs; continue zosyn, day #2.  Avoid hepatotoxins.  Daily BMP and LFTs.   "

## 2017-02-08 NOTE — H&P
[facility]  [facility]    Patient Name: Berlin Villa  MRN: [id number]  Admission Date: 2/8/2017  Attending Physician: Dale Valera MD   Primary Care Provider: Luis M Ruelas MD         Patient information was obtained from patient, past medical records and ER records.     Subjective:     Principal Problem:Biliary obstruction due to cancer    Chief Complaint: No chief complaint on file.       HPI: Berlin Villa is a 56 y.o.  male with with HTN, anxiety, GERD, metastatic colon cancer(originally diagnosed in 2010) to the liver (May 2016) that is currently undergoing chemotherapy with Heme/Onc Dr. Karoline Calzada.  He was recently hospitalized at [facility] 1/31/17-2/4/17 for Biliary obstruction due to cancer.  Had ERCP with biliary stent placement performed by GI Dr. Josh Reyna on 2/4/17; was discharged later that evening to f/u with GI in 2-4 weeks.     Patient presented to Heme/onc Dr. Calzada for office visit today with complaints of increased weakness and fatigue accompanied by nausea, vomiting, poor appetite, and back pain (chronic); noted to have hypotension and tachycardia during office visit.  Patient was sent to ED for evaluation.  Upon arrival to [facility] ED patient with SBP 80s-90s, HR 110s.  Tbili 8.2 (6.7 on 2/4/17),  (263), AST 93 (57), ALT 33 (17), WBC 11.2 (8.95).  ED physician spoke with GI Dr. Reyna, who recommended IVFs and IV Abx and to discharge patient home from ED to f/u in clinic the following day with him (GI).  Given 2L NS bolus, metoclopramide 10 mg IV, and zosyn 4.5 Gm in ED.  Patient with complaints of weakness and fatigue, ED physician did not feel patient safe to be discharged home from ED.  Accepted for transfer and admission to Hospital Medicine service.  Patient transferred to [facility].           Past Medical History   Diagnosis Date    Arthritis      sacroilitis    Cancer 1999     colon cancer     Cataract     Colon cancer     Degenerative disc disease     Foot pain     GERD (gastroesophageal reflux disease)     Hypertension        Past Surgical History   Procedure Laterality Date    Colon surgery      Cataract extraction Bilateral        Review of patient's allergies indicates:  No Known Allergies    Current Facility-Administered Medications on File Prior to Encounter   Medication    [COMPLETED] 0.9%  NaCl infusion    [COMPLETED] 0.9%  NaCl infusion    [COMPLETED] hydromorphone (PF) injection 1 mg    [COMPLETED] metoclopramide HCl injection 10 mg    [COMPLETED] metoclopramide HCl injection 10 mg    [COMPLETED] ondansetron injection 4 mg    [COMPLETED] piperacillin-tazobactam 4.5 g in dextrose 5 % 100 mL IVPB (ready to mix system)     Current Outpatient Prescriptions on File Prior to Encounter   Medication Sig    alprazolam (XANAX) 0.25 MG tablet Take 1 tablet (0.25 mg total) by mouth 3 (three) times daily as needed for Anxiety.    amitriptyline (ELAVIL) 10 MG tablet Take 1 tablet (10 mg total) by mouth 2 (two) times daily.    lisinopril (PRINIVIL,ZESTRIL) 20 MG tablet Take 1 tablet (20 mg total) by mouth once daily.    ondansetron (ZOFRAN) 8 MG tablet Take 1 tablet (8 mg total) by mouth every 12 (twelve) hours as needed for Nausea.    oxycodone (ROXICODONE) 30 MG Tab Take 1 tablet (30 mg total) by mouth 4 (four) times daily. (Patient taking differently: Take 30 mg by mouth every 6 (six) hours as needed (pain). )    pantoprazole (PROTONIX) 20 MG tablet Take 20 mg by mouth once daily.    prochlorperazine (COMPAZINE) 10 MG tablet Take 1 tablet (10 mg total) by mouth every 6 (six) hours as needed.     Family History     Problem Relation (Age of Onset)    COPD Brother    Cancer Mother, Father, Brother, Paternal Grandfather    Diabetes Brother        Social History Main Topics    Smoking status: Former Smoker     Packs/day: 0.50     Years: 2.00    Smokeless tobacco: Former User      Comment:  smokless tobacco when a teenager     Alcohol use No    Drug use: No    Sexual activity: Yes     Partners: Female     Review of Systems   Constitutional: Positive for fatigue. Negative for chills and fever.   HENT: Negative for congestion, sore throat and trouble swallowing.    Eyes: Negative for photophobia and visual disturbance.   Respiratory: Negative for cough and shortness of breath.    Cardiovascular: Negative for chest pain and leg swelling.   Gastrointestinal: Positive for nausea and vomiting. Negative for abdominal pain, constipation and diarrhea.   Endocrine: Negative for cold intolerance and heat intolerance.   Genitourinary: Negative for dysuria, frequency and urgency.   Musculoskeletal: Positive for back pain. Negative for myalgias.   Skin: Negative for color change and pallor.   Neurological: Positive for weakness. Negative for headaches.   Hematological: Does not bruise/bleed easily.   Psychiatric/Behavioral: Negative for agitation and confusion.     Objective:     Vital Signs (Most Recent):  Temp: 98.4 °F (36.9 °C) (02/08/17 0038)  Pulse: 101 (02/08/17 0038)  Resp: 20 (02/08/17 0038)  BP: (!) 93/54 (02/08/17 0038)  SpO2: (!) 90 % (02/08/17 0038) Vital Signs (24h Range):  Temp:  [97.6 °F (36.4 °C)-98.4 °F (36.9 °C)] 98.4 °F (36.9 °C)  Pulse:  [] 101  Resp:  [16-20] 20  SpO2:  [90 %-98 %] 90 %  BP: ()/(54-81) 93/54     Weight: 89.8 kg (198 lb)  Body mass index is 27.62 kg/(m^2).    Physical Exam   Constitutional: He is oriented to person, place, and time. He appears well-developed and well-nourished. No distress.   HENT:   Head: Normocephalic and atraumatic.   Mouth/Throat: Oropharynx is clear and moist. He has dentures.   Eyes: EOM are normal. Pupils are equal, round, and reactive to light. Scleral icterus is present.   Neck: Normal range of motion. Neck supple.   Cardiovascular: Normal rate, regular rhythm and intact distal pulses.    Right subclavian port-a-cath.   Pulmonary/Chest:  Effort normal and breath sounds normal. No respiratory distress. He has no wheezes.   Abdominal: Bowel sounds are normal. He exhibits distension. There is no tenderness. There is no rigidity, no rebound and no guarding.   Musculoskeletal: Normal range of motion. He exhibits no edema or tenderness.   Neurological: He is alert and oriented to person, place, and time. He has normal strength. GCS eye subscore is 4. GCS verbal subscore is 5. GCS motor subscore is 6.   Skin: Skin is warm and dry.   Psychiatric: He has a normal mood and affect. His speech is normal and behavior is normal.   Nursing note and vitals reviewed.       Significant Labs:   CBC:   Recent Labs  Lab 02/07/17  1017   WBC 11.20   HGB 15.4   HCT 46.3   *     CMP:   Recent Labs  Lab 02/07/17  1017   *   K 4.1   CL 98   CO2 21*   *   BUN 17   CREATININE 0.8   CALCIUM 8.5*   PROT 5.8*   ALBUMIN 2.1*   BILITOT 8.2*   ALKPHOS 291*   AST 93*   ALT 33   ANIONGAP 11   EGFRNONAA >60     All pertinent labs within the past 24 hours have been reviewed.    Significant Imaging: None    Assessment/Plan:     * Biliary obstruction due to cancer  Abnormal liver enzymes  Elevated bilirubin  Tbili 8.2 up from 6.7 on 2/4/17.  Slight increase in LFTs since discharge on 2/4/17:  (263), AST 93 (57), ALT 33 (17).  NPO p MN; IVFs; continue zosyn.  Avoid hepatotoxins.  Daily BMP and LFTs.  GI consulted for evaluation; appreciate assistance.      GERD (gastroesophageal reflux disease)  PPI      HTN (hypertension), benign  Hold lisinopril due to hypotension.  Continue to monitor, resume when appropriate.      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  Continue to f/u with heme/onc.      Anxiety  PRN xanax.      Chemotherapy-induced thrombocytopenia  Platelet count 140, up from 102 on 2/4/17. Continue to monitor.      Hyponatremia  Serum sodium 130, down  from 134 on 2/4/17; likely related to decreased intake/dehydration.  Given 2L NS in ED; continue IVFs.  Monitor on telemetry.  Daily BMP.      VTE Risk Mitigation         Ordered     enoxaparin injection 40 mg  Daily     Route:  Subcutaneous        02/08/17 0035     High Risk of VTE  Once      02/08/17 0035     Place sequential compression device  Until discontinued      02/08/17 0035     Place RENATA hose  Until discontinued      02/08/17 0035        Silverio Nguyen NP  Department of Hospital Medicine   [facility]

## 2017-02-08 NOTE — TELEPHONE ENCOUNTER
Spoke with pt's son, advised him that Dr. Calzada is working with Axel Sims in reference to pt, nurse advised son  to call with any questions or concerns, and Dr. Calzada, or staff will contact him with further information, when available,  Nurse advised pt's son it should be within a day or two.  Full understanding noted.

## 2017-02-08 NOTE — ED NOTES
Report Called to Sheridan WILSON at [facility]. Transport Called by [facility].  Awaiting arrival.

## 2017-02-08 NOTE — PROGRESS NOTES
"[facility] Hospital Medicine  Progress Note    Patient Name: Berlin Villa  MRN: [id number]  Patient Class: IP- Inpatient   Admission Date: 2/8/2017  Length of Stay: 0 days  Attending Physician: Dale Valera MD  Primary Care Provider: Luis M Ruelas MD        Subjective:     Principal Problem:Biliary obstruction due to cancer    HPI:  Berlin Villa is a 56 y.o.  male with with HTN, anxiety, GERD, metastatic colon cancer(originally diagnosed in 2010) to the liver (May 2016) that is currently undergoing chemotherapy with Heme/Onc Dr. Karoline Calzada.  He was recently hospitalized at [facility] 1/31/17-2/4/17 for Biliary obstruction due to cancer.  Had ERCP with biliary stent placement performed by GI Dr. Josh Reyna on 2/4/17; was discharged later that evening to f/u with GI in 2-4 weeks.     Patient presented to Heme/onc Dr. Calzada for office visit today with complaints of increased weakness and fatigue accompanied by nausea, vomiting, poor appetite, and back pain (chronic); noted to have hypotension and tachycardia during office visit.  Patient was sent to ED for evaluation.  Upon arrival to [facility] ED patient with SBP 80s-90s, HR 110s.  Tbili 8.2 (6.7 on 2/4/17),  (263), AST 93 (57), ALT 33 (17), WBC 11.2 (8.95).  ED physician spoke with GI Dr. Reyna, who recommended IVFs and IV Abx and to discharge patient home from ED to f/u in clinic the following day with him (GI).  Given 2L NS bolus, metoclopramide 10 mg IV, and zosyn 4.5 Gm in ED.  Patient with complaints of weakness and fatigue, ED physician did not feel patient safe to be discharged home from ED.  Accepted for transfer and admission to Hospital Medicine service.  Patient transferred to [facility].           Hospital Course:  Transferred from [facility] for evaluation by GI for biliary obstruction.  Seen by GI on 2/8 who suggests:   "56 year old male s/p stent for biliary stricture here with malaise and " "persistent hyperbilirubinemia, although down from yesterday. Suspect mild cholangitis but no evidence for stent occlusion. Continue antibiotics and trend liver tests but no role for ERCP at this time.     Plan:  1. Continue Zosyn and routine supportive care  2. Trend liver tests  3. No role for ERCP at this time."      Interval History: Pt c/o dry heaves, fatigue. Denies CP, SOB. States difficulty urinating and no BM for past 3 days.  Seen by GI who have no intervention planned.      Review of Systems   Constitutional: Positive for fatigue. Negative for fever.   Respiratory: Negative for shortness of breath and wheezing.    Cardiovascular: Negative for chest pain.   Gastrointestinal: Positive for constipation, nausea and vomiting.   Genitourinary: Positive for difficulty urinating.   Musculoskeletal: Positive for back pain.   Neurological: Positive for weakness.     Objective:     Vital Signs (Most Recent):  Temp: 97.7 °F (36.5 °C) (02/08/17 1227)  Pulse: 101 (02/08/17 1227)  Resp: 18 (02/08/17 1227)  BP: (!) 104/55 (02/08/17 1227)  SpO2: (!) 93 % (02/08/17 1627) Vital Signs (24h Range):  Temp:  [96.1 °F (35.6 °C)-98.4 °F (36.9 °C)] 97.7 °F (36.5 °C)  Pulse:  [] 101  Resp:  [18-20] 18  SpO2:  [90 %-98 %] 93 %  BP: ()/(52-81) 104/55     Weight: 89.8 kg (198 lb)  Body mass index is 27.62 kg/(m^2).    Intake/Output Summary (Last 24 hours) at 02/08/17 1637  Last data filed at 02/08/17 0659   Gross per 24 hour   Intake              725 ml   Output                0 ml   Net              725 ml      Physical Exam   Constitutional: He is oriented to person, place, and time. He appears well-developed and well-nourished. No distress.   Cardiovascular: Normal rate and regular rhythm.    Pulmonary/Chest: Effort normal and breath sounds normal. No respiratory distress. He has no wheezes.   Abdominal: Soft. Bowel sounds are normal. There is no tenderness.   Musculoskeletal: He exhibits no edema.   Neurological: He is " "alert and oriented to person, place, and time.   Skin: Skin is warm and dry. He is not diaphoretic.   Psychiatric: He has a normal mood and affect.   Nursing note and vitals reviewed.      Significant Labs:   CBC:   Recent Labs  Lab 02/07/17  1017 02/08/17  0443   WBC 11.20 8.29   HGB 15.4 13.9*   HCT 46.3 40.6   * 106*     CMP:   Recent Labs  Lab 02/07/17  1017 02/08/17  0443   * 130*   K 4.1 3.8   CL 98 100   CO2 21* 21*   * 98   BUN 17 20   CREATININE 0.8 0.7   CALCIUM 8.5* 7.7*   PROT 5.8* 4.8*   ALBUMIN 2.1* 1.7*   BILITOT 8.2* 5.6*   ALKPHOS 291* 247*   AST 93* 63*   ALT 33 26   ANIONGAP 11 9   EGFRNONAA >60 >60     Coagulation:   Recent Labs  Lab 02/08/17  0443   INR 1.8*       Significant Imaging: no new     Assessment/Plan:      * Biliary obstruction due to cancer  Abnormal liver enzymes  Elevated bilirubin  Hyponatremia   Tbili 5.6 today, down from 8.2 on admission.  Decreased in LFTs this morning.  Seen by GI who has no intervention planned: "Suspect mild cholangitis but no evidence for stent occlusion. Continue antibiotics and trend liver tests but no role for ERCP at this time."     Continue IVFs; continue zosyn, day #2.  Avoid hepatotoxins.  Daily BMP and LFTs.     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 Calzada. Continue pain management. On warfarin for portal vein thrombosis.  INR 1.8 today.  Resume home coumadin with daily INRs.       HTN (hypertension), benign  Holding lisinopril due to hypotension.  Continue to monitor, resume when appropriate. SBP .       Chemotherapy-induced thrombocytopenia  Platelet count 106 - about the same as on discharge on 2/4/17. No visible, active bleeding. Continue to monitor.      Anxiety  Calm at present. PRN xanax.      VTE Risk Mitigation         Ordered     High Risk of VTE  Once. Already anticoagulated on home coumadin.     02/08/17 0035     Place sequential compression device  " Until discontinued      02/08/17 0035     Place RENATA hose  Until discontinued      02/08/17 0035          Kylee Carbajal PA-C  Department of [facility] Medicine   [facility]  Pager: [phone]    Attending: Dale Valera MD

## 2017-02-08 NOTE — ASSESSMENT & PLAN NOTE
Abnormal liver enzymes  Elevated bilirubin  Tbili 8.2 up from 6.7 on 2/4/17.  Slight increase in LFTs since discharge on 2/4/17:  (263), AST 93 (57), ALT 33 (17).  NPO p MN; IVFs; continue zosyn.  Avoid hepatotoxins.  Daily BMP and LFTs.  GI consulted for evaluation; appreciate assistance.

## 2017-02-08 NOTE — ASSESSMENT & PLAN NOTE
Portal vein thrombosis   Oncologist Dr. Calzada. Continue pain management. On warfarin for portal vein thrombosis.  INR 1.8 today.  Resume home coumadin with daily INRs.

## 2017-02-08 NOTE — SUBJECTIVE & OBJECTIVE
Interval History: Pt c/o dry heaves, fatigue. Denies CP, SOB. States difficulty urinating and no BM for past 3 days.  Seen by GI who have no intervention planned.      Review of Systems   Constitutional: Positive for fatigue. Negative for fever.   Respiratory: Negative for shortness of breath and wheezing.    Cardiovascular: Negative for chest pain.   Gastrointestinal: Positive for constipation, nausea and vomiting.   Genitourinary: Positive for difficulty urinating.   Musculoskeletal: Positive for back pain.   Neurological: Positive for weakness.     Objective:     Vital Signs (Most Recent):  Temp: 97.7 °F (36.5 °C) (02/08/17 1227)  Pulse: 101 (02/08/17 1227)  Resp: 18 (02/08/17 1227)  BP: (!) 104/55 (02/08/17 1227)  SpO2: (!) 93 % (02/08/17 1627) Vital Signs (24h Range):  Temp:  [96.1 °F (35.6 °C)-98.4 °F (36.9 °C)] 97.7 °F (36.5 °C)  Pulse:  [] 101  Resp:  [18-20] 18  SpO2:  [90 %-98 %] 93 %  BP: ()/(52-81) 104/55     Weight: 89.8 kg (198 lb)  Body mass index is 27.62 kg/(m^2).    Intake/Output Summary (Last 24 hours) at 02/08/17 1637  Last data filed at 02/08/17 0659   Gross per 24 hour   Intake              725 ml   Output                0 ml   Net              725 ml      Physical Exam   Constitutional: He is oriented to person, place, and time. He appears well-developed and well-nourished. No distress.   Cardiovascular: Normal rate and regular rhythm.    Pulmonary/Chest: Effort normal and breath sounds normal. No respiratory distress. He has no wheezes.   Abdominal: Soft. Bowel sounds are normal. There is no tenderness.   Musculoskeletal: He exhibits no edema.   Neurological: He is alert and oriented to person, place, and time.   Skin: Skin is warm and dry. He is not diaphoretic.   Psychiatric: He has a normal mood and affect.   Nursing note and vitals reviewed.      Significant Labs:   CBC:   Recent Labs  Lab 02/07/17  1017 02/08/17  0443   WBC 11.20 8.29   HGB 15.4 13.9*   HCT 46.3 40.6   PLT  140* 106*     CMP:   Recent Labs  Lab 02/07/17  1017 02/08/17  0443   * 130*   K 4.1 3.8   CL 98 100   CO2 21* 21*   * 98   BUN 17 20   CREATININE 0.8 0.7   CALCIUM 8.5* 7.7*   PROT 5.8* 4.8*   ALBUMIN 2.1* 1.7*   BILITOT 8.2* 5.6*   ALKPHOS 291* 247*   AST 93* 63*   ALT 33 26   ANIONGAP 11 9   EGFRNONAA >60 >60     Coagulation:   Recent Labs  Lab 02/08/17  0443   INR 1.8*       Significant Imaging: no new

## 2017-02-08 NOTE — SUBJECTIVE & OBJECTIVE
Past Medical History   Diagnosis Date    Arthritis      sacroilitis    Cancer 1999     colon cancer    Cataract     Colon cancer     Degenerative disc disease     Foot pain     GERD (gastroesophageal reflux disease)     Hypertension        Past Surgical History   Procedure Laterality Date    Colon surgery      Cataract extraction Bilateral        Review of patient's allergies indicates:  No Known Allergies    Current Facility-Administered Medications on File Prior to Encounter   Medication    [COMPLETED] 0.9%  NaCl infusion    [COMPLETED] 0.9%  NaCl infusion    [COMPLETED] hydromorphone (PF) injection 1 mg    [COMPLETED] metoclopramide HCl injection 10 mg    [COMPLETED] metoclopramide HCl injection 10 mg    [COMPLETED] ondansetron injection 4 mg    [COMPLETED] piperacillin-tazobactam 4.5 g in dextrose 5 % 100 mL IVPB (ready to mix system)     Current Outpatient Prescriptions on File Prior to Encounter   Medication Sig    alprazolam (XANAX) 0.25 MG tablet Take 1 tablet (0.25 mg total) by mouth 3 (three) times daily as needed for Anxiety.    amitriptyline (ELAVIL) 10 MG tablet Take 1 tablet (10 mg total) by mouth 2 (two) times daily.    lisinopril (PRINIVIL,ZESTRIL) 20 MG tablet Take 1 tablet (20 mg total) by mouth once daily.    ondansetron (ZOFRAN) 8 MG tablet Take 1 tablet (8 mg total) by mouth every 12 (twelve) hours as needed for Nausea.    oxycodone (ROXICODONE) 30 MG Tab Take 1 tablet (30 mg total) by mouth 4 (four) times daily. (Patient taking differently: Take 30 mg by mouth every 6 (six) hours as needed (pain). )    pantoprazole (PROTONIX) 20 MG tablet Take 20 mg by mouth once daily.    prochlorperazine (COMPAZINE) 10 MG tablet Take 1 tablet (10 mg total) by mouth every 6 (six) hours as needed.     Family History     Problem Relation (Age of Onset)    COPD Brother    Cancer Mother, Father, Brother, Paternal Grandfather    Diabetes Brother        Social History Main Topics    Smoking  status: Former Smoker     Packs/day: 0.50     Years: 2.00    Smokeless tobacco: Former User      Comment: smokless tobacco when a teenager     Alcohol use No    Drug use: No    Sexual activity: Yes     Partners: Female     Review of Systems   Constitutional: Positive for fatigue. Negative for chills and fever.   HENT: Negative for congestion, sore throat and trouble swallowing.    Eyes: Negative for photophobia and visual disturbance.   Respiratory: Negative for cough and shortness of breath.    Cardiovascular: Negative for chest pain and leg swelling.   Gastrointestinal: Positive for nausea and vomiting. Negative for abdominal pain, constipation and diarrhea.   Endocrine: Negative for cold intolerance and heat intolerance.   Genitourinary: Negative for dysuria, frequency and urgency.   Musculoskeletal: Positive for back pain. Negative for myalgias.   Skin: Negative for color change and pallor.   Neurological: Positive for weakness. Negative for headaches.   Hematological: Does not bruise/bleed easily.   Psychiatric/Behavioral: Negative for agitation and confusion.     Objective:     Vital Signs (Most Recent):  Temp: 98.4 °F (36.9 °C) (02/08/17 0038)  Pulse: 101 (02/08/17 0038)  Resp: 20 (02/08/17 0038)  BP: (!) 93/54 (02/08/17 0038)  SpO2: (!) 90 % (02/08/17 0038) Vital Signs (24h Range):  Temp:  [97.6 °F (36.4 °C)-98.4 °F (36.9 °C)] 98.4 °F (36.9 °C)  Pulse:  [] 101  Resp:  [16-20] 20  SpO2:  [90 %-98 %] 90 %  BP: ()/(54-81) 93/54     Weight: 89.8 kg (198 lb)  Body mass index is 27.62 kg/(m^2).    Physical Exam   Constitutional: He is oriented to person, place, and time. He appears well-developed and well-nourished. No distress.   HENT:   Head: Normocephalic and atraumatic.   Mouth/Throat: Oropharynx is clear and moist. He has dentures.   Eyes: EOM are normal. Pupils are equal, round, and reactive to light. Scleral icterus is present.   Neck: Normal range of motion. Neck supple.   Cardiovascular:  Normal rate, regular rhythm and intact distal pulses.    Right subclavian port-a-cath.   Pulmonary/Chest: Effort normal and breath sounds normal. No respiratory distress. He has no wheezes.   Abdominal: Bowel sounds are normal. He exhibits distension. There is no tenderness. There is no rigidity, no rebound and no guarding.   Musculoskeletal: Normal range of motion. He exhibits no edema or tenderness.   Neurological: He is alert and oriented to person, place, and time. He has normal strength. GCS eye subscore is 4. GCS verbal subscore is 5. GCS motor subscore is 6.   Skin: Skin is warm and dry.   Psychiatric: He has a normal mood and affect. His speech is normal and behavior is normal.   Nursing note and vitals reviewed.       Significant Labs:   CBC:   Recent Labs  Lab 02/07/17  1017   WBC 11.20   HGB 15.4   HCT 46.3   *     CMP:   Recent Labs  Lab 02/07/17  1017   *   K 4.1   CL 98   CO2 21*   *   BUN 17   CREATININE 0.8   CALCIUM 8.5*   PROT 5.8*   ALBUMIN 2.1*   BILITOT 8.2*   ALKPHOS 291*   AST 93*   ALT 33   ANIONGAP 11   EGFRNONAA >60     All pertinent labs within the past 24 hours have been reviewed.    Significant Imaging: None

## 2017-02-08 NOTE — PLAN OF CARE
Problem: Patient Care Overview  Goal: Plan of Care Review  Outcome: Ongoing (interventions implemented as appropriate)  Plan of care reviewed with the patient. Patient remains on tele running sinus tachycardia. Patient verbalizes fall precautions, wheels locked, bed in the low lying position, call light within reach. Will continue to monitor, report given to KATIE Burgess.

## 2017-02-08 NOTE — ED NOTES
Dr. Sanchez on phone with Transfer Center.  Will be transferred to [facility] Direct Admit,. Awaiting for room and EMS transport.

## 2017-02-08 NOTE — ASSESSMENT & PLAN NOTE
Serum sodium 130, down from 134 on 2/4/17; likely related to decreased intake/dehydration.  Given 2L NS in ED; continue IVFs.  Monitor on telemetry.  Daily BMP.

## 2017-02-08 NOTE — PROGRESS NOTES
.Pharmacy New Medication Education    Patient accepted medication education.    Pharmacy educated patient on the following medications, using the teach-back method.   Apap  Xanax  Amitriptyline  Lovenox  Morphine  Zofran  Pantoprazole  Zosyn  ramelteon    Learners of pharmacy medication education included:  patient    Patient +/- learner response:  verbalize understanding

## 2017-02-08 NOTE — ED NOTES
Resting at this time on monitor no request. 1 of 10 pain. Still awaiting Transfer dispo to [place].

## 2017-02-08 NOTE — PLAN OF CARE
Seen by Dr Valera. Medicated for pain as ordered. Skin mildly jaundiced The patient is asked to make an attempt to improve diet and exercise patterns to aid in medical management of this problem. Advanced to regular . No ss of visible bleeding. ,Platelets 106. Continued on telemetry and safety monitoring, No true red alarm ectopy .

## 2017-02-08 NOTE — TELEPHONE ENCOUNTER
----- Message from Sylvia Sandoval sent at 2/8/2017 12:11 PM CST -----  Contact: Berlin Villa  Son called regarding the patient being in [place]. Need to know information about his process. Please contact [phone]

## 2017-02-08 NOTE — ED NOTES
Patient has been updated that transfer center is working on a bed assignment for patient. Patient has c/o nausea. Dr. Sanchez is aware.

## 2017-02-08 NOTE — PLAN OF CARE
Future Appointments  Date Time Provider Department Center   2/13/2017 11:15 AM LAB, SLIDELL SAT [facility] LAB [place]   2/13/2017 2:40 PM KYM Gomez [facility] MED [place]   2/16/2017 2:00 PM Josh Reyna MD [facility]   9/21/2017 1:30 PM Luis M Ruelas MD [facility] MED [place]        02/08/17 1146   Discharge Assessment   Assessment Type Discharge Planning Assessment   Confirmed/corrected address and phone number on facesheet? Yes   Assessment information obtained from? Patient   Expected Length of Stay (days) 3   Communicated expected length of stay with patient/caregiver yes   If Healthcare Directive is received, is it scanned into Epic? no (comment)   Prior to hospitilization cognitive status: Alert/Oriented   Prior to hospitalization functional status: Independent   Current cognitive status: Alert/Oriented   Current Functional Status: Independent   Arrived From home or self-care   Lives With alone   Able to Return to Prior Arrangements yes   Is patient able to care for self after discharge? Yes   Does the patient have family/friends to help with healtcare needs after discharge? yes   How many people do you have in your home that can help with your care after discharge? 0   Who are your caregiver(s) and their phone number(s)? son/uncle   Patient's perception of discharge disposition home or selfcare   Readmission Within The Last 30 Days other (see comments)  (nausea/weakness)   Patient currently being followed by outpatient case management? No   Patient currently receives home health services? No   Does the patient currently use HME? No   Patient currently receives private duty nursing? No   Patient currently receives any other outside agency services? No   Equipment Currently Used at Home none   Do you have any problems affording any of your prescribed medications? Yes   Is the patient taking medications as prescribed? yes   Do you have any financial concerns preventing you from receiving  the healthcare you need? No   Does the patient have transportation to healthcare appointments? Yes   Transportation Available family or friend will provide   On Dialysis? No   Does the patient receive services at the Coumadin Clinic? No  (was taken off of coumadin last week)   Are there any open cases? No   Discharge Plan A Home   Discharge Plan B Home;Home with family   Patient/Family In Agreement With Plan yes     Mary Villatoro RN, CCM, CMSRN  RN Transition Navigator  [phone]

## 2017-02-08 NOTE — ASSESSMENT & PLAN NOTE
Platelet count 106 - about the same as on discharge on 2/4/17. No visible, active bleeding. Continue to monitor.

## 2017-02-08 NOTE — TELEPHONE ENCOUNTER
Patient seen , full consult to follow. Increase in cholestasis post stent likely mild cholangitis . Improving bili on antibiotics . D/W Dr. Calzada

## 2017-02-09 PROBLEM — N39.0 BACTERIAL UTI: Status: ACTIVE | Noted: 2017-02-09

## 2017-02-09 PROBLEM — I95.0 IDIOPATHIC HYPOTENSION: Status: ACTIVE | Noted: 2017-02-09

## 2017-02-09 PROBLEM — I95.9 SEPSIS ASSOCIATED HYPOTENSION: Status: ACTIVE | Noted: 2017-02-09

## 2017-02-09 PROBLEM — A49.9 BACTERIAL UTI: Status: ACTIVE | Noted: 2017-02-09

## 2017-02-09 PROBLEM — N30.00 ACUTE CYSTITIS WITHOUT HEMATURIA: Status: ACTIVE | Noted: 2017-02-09

## 2017-02-09 PROBLEM — A41.9 SEPSIS ASSOCIATED HYPOTENSION: Status: ACTIVE | Noted: 2017-02-09

## 2017-02-09 LAB
ALBUMIN SERPL BCP-MCNC: 1.7 G/DL
ALP SERPL-CCNC: 270 U/L
ALT SERPL W/O P-5'-P-CCNC: 28 U/L
ANION GAP SERPL CALC-SCNC: 7 MMOL/L
AST SERPL-CCNC: 75 U/L
BACTERIA #/AREA URNS HPF: ABNORMAL /HPF
BASOPHILS # BLD AUTO: 0.01 K/UL
BASOPHILS NFR BLD: 0 %
BILIRUB DIRECT SERPL-MCNC: 4.2 MG/DL
BILIRUB SERPL-MCNC: 5.3 MG/DL
BILIRUB UR QL STRIP: ABNORMAL
BUN SERPL-MCNC: 24 MG/DL
CALCIUM SERPL-MCNC: 7.7 MG/DL
CHLORIDE SERPL-SCNC: 100 MMOL/L
CLARITY UR: ABNORMAL
CO2 SERPL-SCNC: 22 MMOL/L
COLOR UR: ABNORMAL
CREAT SERPL-MCNC: 1.3 MG/DL
DIFFERENTIAL METHOD: ABNORMAL
EOSINOPHIL # BLD AUTO: 0 K/UL
EOSINOPHIL NFR BLD: 0 %
ERYTHROCYTE [DISTWIDTH] IN BLOOD BY AUTOMATED COUNT: 15.9 %
EST. GFR  (AFRICAN AMERICAN): >60 ML/MIN/1.73 M^2
EST. GFR  (NON AFRICAN AMERICAN): >60 ML/MIN/1.73 M^2
GLUCOSE SERPL-MCNC: 115 MG/DL
GLUCOSE UR QL STRIP: ABNORMAL
GRAM STN SPEC: NORMAL
HCT VFR BLD AUTO: 41.9 %
HGB BLD-MCNC: 13.8 G/DL
HGB UR QL STRIP: ABNORMAL
HYALINE CASTS #/AREA URNS LPF: 0 /LPF
INR PPP: 2.4
KETONES UR QL STRIP: ABNORMAL
LACTATE SERPL-SCNC: 2 MMOL/L
LEUKOCYTE ESTERASE UR QL STRIP: NEGATIVE
LYMPHOCYTES # BLD AUTO: 0.6 K/UL
LYMPHOCYTES NFR BLD: 2.7 %
MCH RBC QN AUTO: 31.8 PG
MCHC RBC AUTO-ENTMCNC: 32.9 %
MCV RBC AUTO: 97 FL
MICROSCOPIC COMMENT: ABNORMAL
MONOCYTES # BLD AUTO: 2 K/UL
MONOCYTES NFR BLD: 9.2 %
NEUTROPHILS # BLD AUTO: 18.7 K/UL
NEUTROPHILS NFR BLD: 87.7 %
NITRITE UR QL STRIP: POSITIVE
PH UR STRIP: 5 [PH] (ref 5–8)
PLATELET # BLD AUTO: 62 K/UL
PMV BLD AUTO: 10.6 FL
POTASSIUM SERPL-SCNC: 4.1 MMOL/L
PROT SERPL-MCNC: 4.8 G/DL
PROT UR QL STRIP: ABNORMAL
PROTHROMBIN TIME: 24.9 SEC
RBC # BLD AUTO: 4.34 M/UL
RBC #/AREA URNS HPF: 0 /HPF (ref 0–4)
SODIUM SERPL-SCNC: 129 MMOL/L
SP GR UR STRIP: >=1.03 (ref 1–1.03)
SQUAMOUS #/AREA URNS HPF: 6 /HPF
TSH SERPL DL<=0.005 MIU/L-ACNC: 1.41 UIU/ML
URN SPEC COLLECT METH UR: ABNORMAL
UROBILINOGEN UR STRIP-ACNC: 1 EU/DL
WBC # BLD AUTO: 21.31 K/UL
WBC #/AREA URNS HPF: 15 /HPF (ref 0–5)
WBC CLUMPS URNS QL MICRO: ABNORMAL

## 2017-02-09 PROCEDURE — 81000 URINALYSIS NONAUTO W/SCOPE: CPT

## 2017-02-09 PROCEDURE — 85610 PROTHROMBIN TIME: CPT

## 2017-02-09 PROCEDURE — 85025 COMPLETE CBC W/AUTO DIFF WBC: CPT

## 2017-02-09 PROCEDURE — 80048 BASIC METABOLIC PNL TOTAL CA: CPT

## 2017-02-09 PROCEDURE — 93005 ELECTROCARDIOGRAM TRACING: CPT

## 2017-02-09 PROCEDURE — 87040 BLOOD CULTURE FOR BACTERIA: CPT | Mod: 59

## 2017-02-09 PROCEDURE — 94761 N-INVAS EAR/PLS OXIMETRY MLT: CPT

## 2017-02-09 PROCEDURE — 80076 HEPATIC FUNCTION PANEL: CPT

## 2017-02-09 PROCEDURE — 84443 ASSAY THYROID STIM HORMONE: CPT

## 2017-02-09 PROCEDURE — 25000003 PHARM REV CODE 250: Performed by: PHYSICIAN ASSISTANT

## 2017-02-09 PROCEDURE — 11000001 HC ACUTE MED/SURG PRIVATE ROOM

## 2017-02-09 PROCEDURE — 87086 URINE CULTURE/COLONY COUNT: CPT

## 2017-02-09 PROCEDURE — 87205 SMEAR GRAM STAIN: CPT

## 2017-02-09 PROCEDURE — 36415 COLL VENOUS BLD VENIPUNCTURE: CPT

## 2017-02-09 PROCEDURE — 25000003 PHARM REV CODE 250: Performed by: HOSPITALIST

## 2017-02-09 PROCEDURE — 63600175 PHARM REV CODE 636 W HCPCS: Performed by: NURSE PRACTITIONER

## 2017-02-09 PROCEDURE — 25000003 PHARM REV CODE 250: Performed by: NURSE PRACTITIONER

## 2017-02-09 PROCEDURE — 99233 SBSQ HOSP IP/OBS HIGH 50: CPT | Mod: ,,, | Performed by: INTERNAL MEDICINE

## 2017-02-09 PROCEDURE — 93010 ELECTROCARDIOGRAM REPORT: CPT | Mod: ,,, | Performed by: INTERNAL MEDICINE

## 2017-02-09 PROCEDURE — 83605 ASSAY OF LACTIC ACID: CPT

## 2017-02-09 RX ORDER — WARFARIN 2 MG/1
2 TABLET ORAL DAILY
Status: DISCONTINUED | OUTPATIENT
Start: 2017-02-11 | End: 2017-02-10

## 2017-02-09 RX ORDER — OXYCODONE HYDROCHLORIDE 5 MG/1
30 TABLET ORAL EVERY 6 HOURS PRN
Status: DISCONTINUED | OUTPATIENT
Start: 2017-02-09 | End: 2017-02-15 | Stop reason: HOSPADM

## 2017-02-09 RX ADMIN — PANTOPRAZOLE SODIUM 20 MG: 20 TABLET, DELAYED RELEASE ORAL at 09:02

## 2017-02-09 RX ADMIN — STANDARDIZED SENNA CONCENTRATE AND DOCUSATE SODIUM 1 TABLET: 8.6; 5 TABLET, FILM COATED ORAL at 09:02

## 2017-02-09 RX ADMIN — SODIUM CHLORIDE 1000 ML: 0.9 INJECTION, SOLUTION INTRAVENOUS at 09:02

## 2017-02-09 RX ADMIN — OXYCODONE HYDROCHLORIDE 30 MG: 5 TABLET ORAL at 05:02

## 2017-02-09 RX ADMIN — PIPERACILLIN SODIUM AND TAZOBACTAM SODIUM 4.5 G: 4; .5 INJECTION, POWDER, FOR SOLUTION INTRAVENOUS at 04:02

## 2017-02-09 RX ADMIN — AMITRIPTYLINE HYDROCHLORIDE 10 MG: 10 TABLET, FILM COATED ORAL at 12:02

## 2017-02-09 RX ADMIN — PIPERACILLIN SODIUM AND TAZOBACTAM SODIUM 4.5 G: 4; .5 INJECTION, POWDER, FOR SOLUTION INTRAVENOUS at 10:02

## 2017-02-09 RX ADMIN — AMITRIPTYLINE HYDROCHLORIDE 10 MG: 10 TABLET, FILM COATED ORAL at 10:02

## 2017-02-09 RX ADMIN — STANDARDIZED SENNA CONCENTRATE AND DOCUSATE SODIUM 1 TABLET: 8.6; 5 TABLET, FILM COATED ORAL at 12:02

## 2017-02-09 RX ADMIN — PIPERACILLIN SODIUM AND TAZOBACTAM SODIUM 4.5 G: 4; .5 INJECTION, POWDER, FOR SOLUTION INTRAVENOUS at 12:02

## 2017-02-09 RX ADMIN — STANDARDIZED SENNA CONCENTRATE AND DOCUSATE SODIUM 1 TABLET: 8.6; 5 TABLET, FILM COATED ORAL at 10:02

## 2017-02-09 RX ADMIN — OXYCODONE HYDROCHLORIDE 30 MG: 5 TABLET ORAL at 12:02

## 2017-02-09 RX ADMIN — AMITRIPTYLINE HYDROCHLORIDE 10 MG: 10 TABLET, FILM COATED ORAL at 09:02

## 2017-02-09 NOTE — ASSESSMENT & PLAN NOTE
Portal vein thrombosis   Oncologist Dr. Calzada. Continue pain management. On warfarin for portal vein thrombosis.  INR 2.4 today.  Holding home coumadin for thrombocytopenia today.  Continue daily INRs.

## 2017-02-09 NOTE — PHARMACY MED REC
"MedMined Medication Reconciliation  Template    Patient was admitted on 2/8/2017 for Sepsis associated hypotension.      Patient's prior to admission medication regimen was as follows:  Prescriptions Prior to Admission   Medication Sig Dispense Refill Last Dose    alprazolam (XANAX) 0.25 MG tablet Take 1 tablet (0.25 mg total) by mouth 3 (three) times daily as needed for Anxiety. 90 tablet 0 Taking    amitriptyline (ELAVIL) 10 MG tablet Take 1 tablet (10 mg total) by mouth 2 (two) times daily. 60 tablet 1 Taking    lisinopril (PRINIVIL,ZESTRIL) 20 MG tablet Take 1 tablet (20 mg total) by mouth once daily. 30 tablet 6 Taking    ondansetron (ZOFRAN) 8 MG tablet Take 1 tablet (8 mg total) by mouth every 12 (twelve) hours as needed for Nausea. 30 tablet 2 Taking    oxycodone (ROXICODONE) 30 MG Tab Take 1 tablet (30 mg total) by mouth 4 (four) times daily. (Patient taking differently: Take 30 mg by mouth every 6 (six) hours as needed (pain). ) 120 tablet 0 Taking    pantoprazole (PROTONIX) 20 MG tablet Take 20 mg by mouth once daily.  3 Taking    prochlorperazine (COMPAZINE) 10 MG tablet Take 1 tablet (10 mg total) by mouth every 6 (six) hours as needed. 30 tablet 1 Taking         Please add appropriate    SmartPhrase below:  Admission Medication Reconciliation - Pharmacy Consult Note    The home medication history was taken by Kizzy Liz CPHT.  Based on information gathered and subsequent review by the clinical pharmacist, the items below may need attention.     You may go to "Admission" then "Reconcile Home Medications" tabs to review and/or act upon these items. Based on information gathered and subsequent review by the clinical pharmacist, the items below may need attention.    Potentially problematic discrepancies with current MAR    o Patient is taking a drug DIFFERENTLY than how ordered upon admit  o Oxy-IR 30mg PO 4times/day; pt takes oxy ir q6h PRN (MD notified; changes made)    Potential " issues to be addressed PRIOR TO DISCHARGE  o Patient sometimes forget to take his medications.    Please address this information as you see fit.  Feel free to contact us if you have any questions or require assistance.    Kraig Scott  [phone]

## 2017-02-09 NOTE — PROGRESS NOTES
[facility] Medicine  Progress Note    Patient Name: Berlin Villa  MRN: [id number]  Patient Class: IP- Inpatient   Admission Date: 2/8/2017  Length of Stay: 1 days  Attending Physician: Dale Valera MD  Primary Care Provider: Luis M Ruelas MD        Subjective:     Principal Problem:Sepsis associated hypotension    HPI:  Berlin Villa is a 56 y.o.  male with with hypertension, anxiety, gastroesophageal reflux disease, metastatic colon cancer (originally diagnosed in 2010) to the liver (May 2016) that is currently undergoing chemotherapy with hematologist-oncologist Dr. Karoline Calzada. He lives in [place], [place]. His primary care physician is Dr. Luis M Ruelas. He was recently hospitalized at [facility] 1/31/17-2/4/17 for biliary obstruction due to his cancer. He had ERCP with biliary stent placement performed by gastroenterologist Dr. Josh Reyna on 2/03/17 and was discharged later that evening to follow up in clinic in 2-4 weeks.     He was seen by Dr. Calzada in clinic on 2/07/17. He complained of increased weakness and fatigue, accompanied by nausea, vomiting, poor appetite, and back pain (chronic). He was noted to have hypotension and tachycardia during the office visit.  He was sent to [facility] ED for evaluation. He had systolic blood pressure in 80s-90s, HR 110s. Tbili was 8.2 (6.7 on 2/4/17),  (263), AST 93 (57), ALT 33 (17), WBC 11.2 (8.95). The ED physician spoke with Dr. Reyna, who recommended IV fluids and IV antibiotics and to discharge him home from the ED to follow up in clinic the following day with him. He was given 2 liters normal saline bolus, metoclopramide 10 mg IV, and piperacillin-tazobactam 4.5 grams. Due to weakness and fatigue, the ED physician did not feel patient safe discharging him home. He was accepted for transfer and admission to [facility]. He was transferred to [facility].       Hospital Course:  Transferred  "from [facility] for evaluation by GI for biliary obstruction.  Seen by GI on 2/8 who suggests: "56 year old male s/p stent for biliary stricture here with malaise and persistent hyperbilirubinemia, although down from yesterday. Suspect mild cholangitis but no evidence for stent occlusion. Continue antibiotics and trend liver tests but no role for ERCP at this time.     2/9 - Noted hypotension and tachycardia overnight.  WBC increased to 21K today with BILLIE.  Work-up for sepsis.        Interval History: Pt states feels "ok" but still very weak.  Attempting to eat breakfast. States mostly thirsty.  Noted hypotension, tachycardia, elevated WBC and BILLIE this morning. Starting sepsis work-up.     Review of Systems   Constitutional: Positive for fatigue. Negative for fever.   Respiratory: Negative for cough, shortness of breath and wheezing.    Cardiovascular: Negative for chest pain.   Gastrointestinal: Positive for constipation and nausea. Negative for vomiting.   Genitourinary: Positive for difficulty urinating.   Musculoskeletal: Positive for back pain.   Neurological: Positive for weakness.     Objective:     Vital Signs (Most Recent):  Temp: 98.6 °F (37 °C) (02/09/17 0400)  Pulse: 105 (02/09/17 0500)  Resp: 18 (02/09/17 0500)  BP: (!) 87/59 (02/09/17 0500)  SpO2: (!) 94 % (02/09/17 0241) Vital Signs (24h Range):  Temp:  [97.7 °F (36.5 °C)-99 °F (37.2 °C)] 98.6 °F (37 °C)  Pulse:  [] 105  Resp:  [18-20] 18  SpO2:  [92 %-94 %] 94 %  BP: ()/(51-63) 87/59     Weight: 89.8 kg (198 lb)  Body mass index is 27.62 kg/(m^2).    Intake/Output Summary (Last 24 hours) at 02/09/17 0915  Last data filed at 02/09/17 0659   Gross per 24 hour   Intake          3537.08 ml   Output              300 ml   Net          3237.08 ml      Physical Exam   Constitutional: He is oriented to person, place, and time. He appears well-developed and well-nourished. No distress.   Eyes: No scleral icterus.   Cardiovascular: Normal rate " "and regular rhythm.    Pulmonary/Chest: Effort normal and breath sounds normal. No respiratory distress. He has no wheezes.   Port in right upper chest.    Abdominal: Soft. Bowel sounds are normal. He exhibits distension. There is no tenderness.   Musculoskeletal: He exhibits no edema.   Neurological: He is alert and oriented to person, place, and time.   Skin: Skin is warm and dry. He is not diaphoretic.   Psychiatric: He has a normal mood and affect.   Nursing note and vitals reviewed.      Significant Labs:   CBC:   Recent Labs  Lab 02/07/17  1017 02/08/17  0443 02/09/17  0606   WBC 11.20 8.29 21.31*   HGB 15.4 13.9* 13.8*   HCT 46.3 40.6 41.9   * 106* 62*     CMP:   Recent Labs  Lab 02/07/17  1017 02/08/17  0443 02/09/17  0605   * 130* 129*   K 4.1 3.8 4.1   CL 98 100 100   CO2 21* 21* 22*   * 98 115*   BUN 17 20 24*   CREATININE 0.8 0.7 1.3   CALCIUM 8.5* 7.7* 7.7*   PROT 5.8* 4.8* 4.8*   ALBUMIN 2.1* 1.7* 1.7*   BILITOT 8.2* 5.6* 5.3*   ALKPHOS 291* 247* 270*   AST 93* 63* 75*   ALT 33 26 28   ANIONGAP 11 9 7*   EGFRNONAA >60 >60 >60     Coagulation:   Recent Labs  Lab 02/09/17  0605   INR 2.4*     Lactic Acid:   Recent Labs  Lab 02/09/17  0838   LACTATE 2.0       Significant Imaging:     Imaging Results         X-Ray Chest PA And Lateral (In process)             Assessment/Plan:      * Sepsis associated hypotension  Noted hypotension, tachycardia overnight. WBC 21K this morning. Pt denies cough or fever at home.  C/O inability to urinate "normally" and no BM for past 4 days.  Ordered sepsis work-up including UA, lactic acid (2.0), CXR and blood cultures.  Ordered IVF bolus.  Pt actually looks better clinically today than yesterday.  Monitor closely.       Acute cholangitis  Biliary obstruction due to cancer   Abnormal liver enzymes  Elevated bilirubin  Hyponatremia   Tbili 5.3 today, down from 8.2 on admission.  Normal AST/ALT this morning.  Seen by GI who has no intervention planned: " ""Suspect mild cholangitis but no evidence for stent occlusion. Continue antibiotics and trend liver tests but no role for ERCP at this time."     Continue IVFs; continue zosyn, day #3.  Avoid hepatotoxins.  Daily BMP and LFTs.       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 Calzada. Continue pain management. On warfarin for portal vein thrombosis.  INR 2.4 today.  Holding home coumadin for thrombocytopenia today.  Continue daily INRs.       HTN (hypertension), benign  Holding lisinopril due to hypotension.  Continue to monitor, resume when appropriate. SBP .       Chemotherapy-induced thrombocytopenia  Platelet count 62 today, possibly 2/2 sepsis. No visible, active bleeding. Holding coumadin for a few days. Continue to monitor.      Anxiety  Calm at present. PRN xanax.      VTE Risk Mitigation         Ordered     High Risk of VTE  Once      02/08/17 0035     Place sequential compression device  Until discontinued      02/08/17 0035     Place RENATA hose  Until discontinued      02/08/17 0035          Kylee Carbajal PA-C  Department of Hospital Medicine   [facility]  "

## 2017-02-09 NOTE — ASSESSMENT & PLAN NOTE
"Noted hypotension, tachycardia overnight. WBC 21K this morning. Pt denies cough or fever at home.  C/O inability to urinate "normally" and no BM for past 4 days.  Ordered sepsis work-up including UA, lactic acid (2.0), CXR and blood cultures.  Ordered IVF bolus.  Pt actually looks better clinically today than yesterday.  Monitor closely.     "

## 2017-02-09 NOTE — PLAN OF CARE
Problem: Patient Care Overview  Goal: Plan of Care Review  Outcome: Ongoing (interventions implemented as appropriate)  Plan of care reviewed with the patient. Patient remains on tele running sinus tachycardia. Patient advised to call if assistance is needed, wheels locked, bed in the low lying position, call light within reach. Will continue to monitor, report given to KATIE Clark.

## 2017-02-09 NOTE — PLAN OF CARE
Problem: Patient Care Overview  Goal: Plan of Care Review  Outcome: Ongoing (interventions implemented as appropriate)  Room air SpO2   %. Pt with no apparent distess noted. Will continue to monitor.

## 2017-02-09 NOTE — SUBJECTIVE & OBJECTIVE
"Interval History: Pt states feels "ok" but still very weak.  Attempting to eat breakfast. States mostly thirsty.  Noted hypotension, tachycardia, elevated WBC and BILLIE this morning. Starting sepsis work-up.     Review of Systems   Constitutional: Positive for fatigue. Negative for fever.   Respiratory: Negative for cough, shortness of breath and wheezing.    Cardiovascular: Negative for chest pain.   Gastrointestinal: Positive for constipation and nausea. Negative for vomiting.   Genitourinary: Positive for difficulty urinating.   Musculoskeletal: Positive for back pain.   Neurological: Positive for weakness.     Objective:     Vital Signs (Most Recent):  Temp: 98.6 °F (37 °C) (02/09/17 0400)  Pulse: 105 (02/09/17 0500)  Resp: 18 (02/09/17 0500)  BP: (!) 87/59 (02/09/17 0500)  SpO2: (!) 94 % (02/09/17 0241) Vital Signs (24h Range):  Temp:  [97.7 °F (36.5 °C)-99 °F (37.2 °C)] 98.6 °F (37 °C)  Pulse:  [] 105  Resp:  [18-20] 18  SpO2:  [92 %-94 %] 94 %  BP: ()/(51-63) 87/59     Weight: 89.8 kg (198 lb)  Body mass index is 27.62 kg/(m^2).    Intake/Output Summary (Last 24 hours) at 02/09/17 0915  Last data filed at 02/09/17 0659   Gross per 24 hour   Intake          3537.08 ml   Output              300 ml   Net          3237.08 ml      Physical Exam   Constitutional: He is oriented to person, place, and time. He appears well-developed and well-nourished. No distress.   Eyes: No scleral icterus.   Cardiovascular: Normal rate and regular rhythm.    Pulmonary/Chest: Effort normal and breath sounds normal. No respiratory distress. He has no wheezes.   Port in right upper chest.    Abdominal: Soft. Bowel sounds are normal. He exhibits distension. There is no tenderness.   Musculoskeletal: He exhibits no edema.   Neurological: He is alert and oriented to person, place, and time.   Skin: Skin is warm and dry. He is not diaphoretic.   Psychiatric: He has a normal mood and affect.   Nursing note and vitals " reviewed.      Significant Labs:   CBC:   Recent Labs  Lab 02/07/17  1017 02/08/17  0443 02/09/17  0606   WBC 11.20 8.29 21.31*   HGB 15.4 13.9* 13.8*   HCT 46.3 40.6 41.9   * 106* 62*     CMP:   Recent Labs  Lab 02/07/17  1017 02/08/17  0443 02/09/17  0605   * 130* 129*   K 4.1 3.8 4.1   CL 98 100 100   CO2 21* 21* 22*   * 98 115*   BUN 17 20 24*   CREATININE 0.8 0.7 1.3   CALCIUM 8.5* 7.7* 7.7*   PROT 5.8* 4.8* 4.8*   ALBUMIN 2.1* 1.7* 1.7*   BILITOT 8.2* 5.6* 5.3*   ALKPHOS 291* 247* 270*   AST 93* 63* 75*   ALT 33 26 28   ANIONGAP 11 9 7*   EGFRNONAA >60 >60 >60     Coagulation:   Recent Labs  Lab 02/09/17  0605   INR 2.4*     Lactic Acid:   Recent Labs  Lab 02/09/17  0838   LACTATE 2.0       Significant Imaging:     Imaging Results         X-Ray Chest PA And Lateral (In process)

## 2017-02-09 NOTE — ASSESSMENT & PLAN NOTE
"Biliary obstruction due to cancer   Abnormal liver enzymes  Elevated bilirubin  Hyponatremia   Tbili 5.3 today, down from 8.2 on admission.  Normal AST/ALT this morning.  Seen by GI who has no intervention planned: "Suspect mild cholangitis but no evidence for stent occlusion. Continue antibiotics and trend liver tests but no role for ERCP at this time."     Continue IVFs; continue zosyn, day #3.  Avoid hepatotoxins.  Daily BMP and LFTs.     "

## 2017-02-09 NOTE — PLAN OF CARE
Problem: Patient Care Overview  Goal: Plan of Care Review  Sats 92% RA; tolerating well; will continue to monitor.

## 2017-02-09 NOTE — PLAN OF CARE
Problem: Patient Care Overview  Goal: Plan of Care Review  Outcome: Ongoing (interventions implemented as appropriate)  Pt receiving IV fluids continuously. MD aware of decreased urine output. Pt went for chest x ray today. Will continue to monitor.

## 2017-02-09 NOTE — ASSESSMENT & PLAN NOTE
Platelet count 62 today, possibly 2/2 sepsis. No visible, active bleeding. Holding coumadin for a few days. Continue to monitor.

## 2017-02-10 ENCOUNTER — DOCUMENTATION ONLY (OUTPATIENT)
Dept: FAMILY MEDICINE | Facility: CLINIC | Age: 57
End: 2017-02-10

## 2017-02-10 PROBLEM — I95.9 SEPSIS ASSOCIATED HYPOTENSION: Status: RESOLVED | Noted: 2017-02-09 | Resolved: 2017-02-10

## 2017-02-10 PROBLEM — A41.9 SEPSIS ASSOCIATED HYPOTENSION: Status: RESOLVED | Noted: 2017-02-09 | Resolved: 2017-02-10

## 2017-02-10 LAB
ALBUMIN SERPL BCP-MCNC: 1.7 G/DL
ALP SERPL-CCNC: 255 U/L
ALT SERPL W/O P-5'-P-CCNC: 34 U/L
ANION GAP SERPL CALC-SCNC: 7 MMOL/L
AST SERPL-CCNC: 98 U/L
BACTERIA UR CULT: NO GROWTH
BASOPHILS # BLD AUTO: 0.01 K/UL
BASOPHILS NFR BLD: 0.1 %
BILIRUB DIRECT SERPL-MCNC: 3.8 MG/DL
BILIRUB SERPL-MCNC: 4.7 MG/DL
BUN SERPL-MCNC: 29 MG/DL
CALCIUM SERPL-MCNC: 7.7 MG/DL
CHLORIDE SERPL-SCNC: 99 MMOL/L
CO2 SERPL-SCNC: 23 MMOL/L
CREAT SERPL-MCNC: 1 MG/DL
DIFFERENTIAL METHOD: ABNORMAL
EOSINOPHIL # BLD AUTO: 0.1 K/UL
EOSINOPHIL NFR BLD: 0.6 %
ERYTHROCYTE [DISTWIDTH] IN BLOOD BY AUTOMATED COUNT: 16.4 %
EST. GFR  (AFRICAN AMERICAN): >60 ML/MIN/1.73 M^2
EST. GFR  (NON AFRICAN AMERICAN): >60 ML/MIN/1.73 M^2
GLUCOSE SERPL-MCNC: 93 MG/DL
HCT VFR BLD AUTO: 41.8 %
HGB BLD-MCNC: 13.9 G/DL
INR PPP: 2.7
LYMPHOCYTES # BLD AUTO: 1.5 K/UL
LYMPHOCYTES NFR BLD: 17.4 %
MCH RBC QN AUTO: 32.6 PG
MCHC RBC AUTO-ENTMCNC: 33.3 %
MCV RBC AUTO: 98 FL
MONOCYTES # BLD AUTO: 1.5 K/UL
MONOCYTES NFR BLD: 17 %
NEUTROPHILS # BLD AUTO: 5.7 K/UL
NEUTROPHILS NFR BLD: 64.7 %
PLATELET # BLD AUTO: 53 K/UL
PMV BLD AUTO: 11 FL
POTASSIUM SERPL-SCNC: 4.1 MMOL/L
PROT SERPL-MCNC: 4.8 G/DL
PROTHROMBIN TIME: 27.9 SEC
RBC # BLD AUTO: 4.27 M/UL
SODIUM SERPL-SCNC: 129 MMOL/L
WBC # BLD AUTO: 8.86 K/UL

## 2017-02-10 PROCEDURE — 80048 BASIC METABOLIC PNL TOTAL CA: CPT

## 2017-02-10 PROCEDURE — 94761 N-INVAS EAR/PLS OXIMETRY MLT: CPT

## 2017-02-10 PROCEDURE — 11000001 HC ACUTE MED/SURG PRIVATE ROOM

## 2017-02-10 PROCEDURE — 80076 HEPATIC FUNCTION PANEL: CPT

## 2017-02-10 PROCEDURE — 99233 SBSQ HOSP IP/OBS HIGH 50: CPT | Mod: ,,, | Performed by: INTERNAL MEDICINE

## 2017-02-10 PROCEDURE — 25000003 PHARM REV CODE 250: Performed by: PHYSICIAN ASSISTANT

## 2017-02-10 PROCEDURE — 63600175 PHARM REV CODE 636 W HCPCS: Performed by: NURSE PRACTITIONER

## 2017-02-10 PROCEDURE — 63600175 PHARM REV CODE 636 W HCPCS: Performed by: HOSPITALIST

## 2017-02-10 PROCEDURE — 25000003 PHARM REV CODE 250: Performed by: NURSE PRACTITIONER

## 2017-02-10 PROCEDURE — 85610 PROTHROMBIN TIME: CPT

## 2017-02-10 PROCEDURE — 25000003 PHARM REV CODE 250: Performed by: HOSPITALIST

## 2017-02-10 PROCEDURE — 36415 COLL VENOUS BLD VENIPUNCTURE: CPT

## 2017-02-10 PROCEDURE — 85025 COMPLETE CBC W/AUTO DIFF WBC: CPT

## 2017-02-10 RX ORDER — BISACODYL 10 MG
10 SUPPOSITORY, RECTAL RECTAL DAILY PRN
Status: DISCONTINUED | OUTPATIENT
Start: 2017-02-10 | End: 2017-02-15 | Stop reason: HOSPADM

## 2017-02-10 RX ORDER — BISACODYL 10 MG
10 SUPPOSITORY, RECTAL RECTAL ONCE
Status: COMPLETED | OUTPATIENT
Start: 2017-02-10 | End: 2017-02-10

## 2017-02-10 RX ORDER — CEFTRIAXONE 1 G/1
1 INJECTION, POWDER, FOR SOLUTION INTRAMUSCULAR; INTRAVENOUS
Status: DISCONTINUED | OUTPATIENT
Start: 2017-02-10 | End: 2017-02-10

## 2017-02-10 RX ORDER — METRONIDAZOLE 500 MG/1
500 TABLET ORAL EVERY 8 HOURS
Status: DISCONTINUED | OUTPATIENT
Start: 2017-02-10 | End: 2017-02-13

## 2017-02-10 RX ADMIN — AMITRIPTYLINE HYDROCHLORIDE 10 MG: 10 TABLET, FILM COATED ORAL at 08:02

## 2017-02-10 RX ADMIN — METRONIDAZOLE 500 MG: 500 TABLET ORAL at 02:02

## 2017-02-10 RX ADMIN — METRONIDAZOLE 500 MG: 500 TABLET ORAL at 08:02

## 2017-02-10 RX ADMIN — STANDARDIZED SENNA CONCENTRATE AND DOCUSATE SODIUM 1 TABLET: 8.6; 5 TABLET, FILM COATED ORAL at 08:02

## 2017-02-10 RX ADMIN — CEFTRIAXONE 1 G: 1 INJECTION, SOLUTION INTRAVENOUS at 12:02

## 2017-02-10 RX ADMIN — SODIUM CHLORIDE: 0.9 INJECTION, SOLUTION INTRAVENOUS at 05:02

## 2017-02-10 RX ADMIN — PIPERACILLIN SODIUM AND TAZOBACTAM SODIUM 4.5 G: 4; .5 INJECTION, POWDER, FOR SOLUTION INTRAVENOUS at 04:02

## 2017-02-10 RX ADMIN — OXYCODONE HYDROCHLORIDE 30 MG: 5 TABLET ORAL at 09:02

## 2017-02-10 RX ADMIN — OXYCODONE HYDROCHLORIDE 30 MG: 5 TABLET ORAL at 02:02

## 2017-02-10 RX ADMIN — PROMETHAZINE HYDROCHLORIDE 6.25 MG: 25 INJECTION INTRAMUSCULAR; INTRAVENOUS at 08:02

## 2017-02-10 RX ADMIN — PANTOPRAZOLE SODIUM 20 MG: 20 TABLET, DELAYED RELEASE ORAL at 08:02

## 2017-02-10 RX ADMIN — OXYCODONE HYDROCHLORIDE 30 MG: 5 TABLET ORAL at 01:02

## 2017-02-10 RX ADMIN — BISACODYL 10 MG: 10 SUPPOSITORY RECTAL at 12:02

## 2017-02-10 NOTE — PLAN OF CARE
Problem: Patient Care Overview  Goal: Plan of Care Review  Outcome: Ongoing (interventions implemented as appropriate)  Plan of care reviewed with the patient. Patient remains on tele running NSR. Patient verbalizes fall precautions, wheels locked, bed in the low lying position, call light within reach. Will continue to monitor, report given to KATIE Clark.

## 2017-02-10 NOTE — PLAN OF CARE
Problem: Patient Care Overview  Goal: Plan of Care Review  Outcome: Ongoing (interventions implemented as appropriate)  Pt receiving iv abx. Pt had bm after suppository. Pt IV fluids maintained.

## 2017-02-10 NOTE — SUBJECTIVE & OBJECTIVE
Interval History: States feeling a little bit better.  Urinating more on his own though it is still dark.  No BM - ordering a 1 time suppository.  Encouraged ambulation.  Pt denies CP, SOB, abdominal pain.     Review of Systems   Constitutional: Positive for fatigue. Negative for fever.   Respiratory: Negative for cough, shortness of breath and wheezing.    Cardiovascular: Negative for chest pain.   Gastrointestinal: Positive for constipation. Negative for nausea and vomiting.   Neurological: Positive for weakness.     Objective:     Vital Signs (Most Recent):  Temp: 97.9 °F (36.6 °C) (02/10/17 0800)  Pulse: 101 (02/10/17 0800)  Resp: 20 (02/10/17 0800)  BP: (!) 114/57 (02/10/17 0800)  SpO2: (!) 93 % (02/10/17 0827) Vital Signs (24h Range):  Temp:  [97.6 °F (36.4 °C)-98.6 °F (37 °C)] 97.9 °F (36.6 °C)  Pulse:  [100-105] 101  Resp:  [20-22] 20  SpO2:  [92 %-93 %] 93 %  BP: ()/(53-71) 114/57     Weight: 89.8 kg (198 lb)  Body mass index is 27.62 kg/(m^2).    Intake/Output Summary (Last 24 hours) at 02/10/17 1145  Last data filed at 02/10/17 0659   Gross per 24 hour   Intake          3937.08 ml   Output              900 ml   Net          3037.08 ml      Physical Exam   Constitutional: He is oriented to person, place, and time. He appears well-developed and well-nourished. No distress.   Eyes: No scleral icterus.   Neck: No JVD present.   Cardiovascular: Normal rate and regular rhythm.    Pulmonary/Chest: Effort normal and breath sounds normal. No respiratory distress. He has no wheezes.   Port in right upper chest.    Abdominal: Soft. Bowel sounds are normal. He exhibits distension. There is no tenderness.   Musculoskeletal: He exhibits no edema.   Neurological: He is alert and oriented to person, place, and time.   Skin: Skin is warm and dry. He is not diaphoretic.   Psychiatric: He has a normal mood and affect.   Nursing note and vitals reviewed.      Significant Labs:   CBC:   Recent Labs  Lab 02/09/17  0606  02/10/17  0712   WBC 21.31* 8.86   HGB 13.8* 13.9*   HCT 41.9 41.8   PLT 62* 53*     CMP:   Recent Labs  Lab 02/09/17  0605 02/10/17  0712   * 129*   K 4.1 4.1    99   CO2 22* 23   * 93   BUN 24* 29*   CREATININE 1.3 1.0   CALCIUM 7.7* 7.7*   PROT 4.8* 4.8*   ALBUMIN 1.7* 1.7*   BILITOT 5.3* 4.7*   ALKPHOS 270* 255*   AST 75* 98*   ALT 28 34   ANIONGAP 7* 7*   EGFRNONAA >60 >60     Urine Studies:   Recent Labs  Lab 02/09/17  1129   COLORU [place]*   APPEARANCEUA Hazy*   PHUR 5.0   SPECGRAV >=1.030*   PROTEINUA 2+*   GLUCUA Trace*   KETONESU 1+*   BILIRUBINUA 3+*   OCCULTUA Trace*   NITRITE Positive*   UROBILINOGEN 1.0   LEUKOCYTESUR Negative   RBCUA 0   WBCUA 15*   BACTERIA Occasional   SQUAMEPITHEL 6   HYALINECASTS 0       Significant Imaging:     Imaging Results         X-Ray Chest PA And Lateral (Final result) Result time:  02/09/17 10:57:07    Final result by Rob Real MD (02/09/17 10:57:07)    Impression:     Minimal atelectasis at the left lung base.      Electronically signed by: ROB REAL MD  Date:     02/09/17  Time:    10:57     Narrative:    PA and lateral chest radiographs      Comparison: 6/27/16    Results: There is a port in the right upper thorax, distal tip in the SVC.  The cardiac silhouette is normal in size, midline.  The pulmonary vascularity is normal.  There is a small band of opacity noted in the left lung base suggestive of minimal atelectasis, no definite large area of focal airspace consolidation.  No pleural effusion.  No pneumothorax.

## 2017-02-10 NOTE — ASSESSMENT & PLAN NOTE
"Noted hypotension, tachycardia, WBC 21K on 2/9. Pt denies cough or fever at home. C/O inability to urinate "normally" and no BM for past 4 days. Ordered sepsis work-up including UA (Positive nitrite, 15 WBC and occasional bacteria) , lactic acid (2.0), CXR (minimal atelectasis at left lung base) and blood cultures (no growth x 1 day). Continue IVF. Pt looks and feels better.  Continue ceftriaxone, day #1.   "

## 2017-02-10 NOTE — PLAN OF CARE
Problem: Patient Care Overview  Goal: Plan of Care Review  Outcome: Ongoing (interventions implemented as appropriate)  Pt on RA sats 93%.

## 2017-02-10 NOTE — PROGRESS NOTES
[facility] Medicine  Progress Note    Patient Name: Berlin Villa  MRN: [id number]  Patient Class: IP- Inpatient   Admission Date: 2/8/2017  Length of Stay: 2 days  Attending Physician: Dale Valera MD  Primary Care Provider: Luis M Ruelas MD        Subjective:     Principal Problem:Sepsis associated hypotension    HPI:  Berlin Villa is a 56 y.o.  male with with hypertension, anxiety, gastroesophageal reflux disease, metastatic colon cancer (originally diagnosed in 2010) to the liver (May 2016) that is currently undergoing chemotherapy with hematologist-oncologist Dr. Karoline Calzada. He lives in [place], [place]. His primary care physician is Dr. Luis M Ruelas. He was recently hospitalized at [facility] 1/31/17-2/4/17 for biliary obstruction due to his cancer. He had ERCP with biliary stent placement performed by gastroenterologist Dr. Josh Reyna on 2/03/17 and was discharged later that evening to follow up in clinic in 2-4 weeks.     He was seen by Dr. Calzada in clinic on 2/07/17. He complained of increased weakness and fatigue, accompanied by nausea, vomiting, poor appetite, and back pain (chronic). He was noted to have hypotension and tachycardia during the office visit.  He was sent to [facility] ED for evaluation. He had systolic blood pressure in 80s-90s, HR 110s. Tbili was 8.2 (6.7 on 2/4/17),  (263), AST 93 (57), ALT 33 (17), WBC 11.2 (8.95). The ED physician spoke with Dr. Reyna, who recommended IV fluids and IV antibiotics and to discharge him home from the ED to follow up in clinic the following day with him. He was given 2 liters normal saline bolus, metoclopramide 10 mg IV, and piperacillin-tazobactam 4.5 grams. Due to weakness and fatigue, the ED physician did not feel patient safe discharging him home. He was accepted for transfer and admission to [facility]. He was transferred to [facility].       Hospital Course:  Transferred  "from [facility] for evaluation by GI for biliary obstruction.  Seen by GI on 2/8 who suggests: "56 year old male s/p stent for biliary stricture here with malaise and persistent hyperbilirubinemia, although down from yesterday. Suspect mild cholangitis but no evidence for stent occlusion. Continue antibiotics and trend liver tests but no role for ERCP at this time.  On 2/9, Noted hypotension and tachycardia overnight.  WBC increased to 21K today with BILLIE.  Work-up for sepsis.  Urinalysis suggests UTI.  Given 1L fluid bolus.  Lactic acid 2.0.  Today, 2/10, WBC back to normal and patient looks and feels better.  Urine gram stain shows No WBC's, epithelial cells or organisms. T Bili continues to improve.  Antibiotics adjusted by Pharmacist for more targeted coverage.         Interval History: States feeling a little bit better.  Urinating more on his own though it is still dark.  No BM - ordering a 1 time suppository.  Encouraged ambulation.  Pt denies CP, SOB, abdominal pain.     Review of Systems   Constitutional: Positive for fatigue. Negative for fever.   Respiratory: Negative for cough, shortness of breath and wheezing.    Cardiovascular: Negative for chest pain.   Gastrointestinal: Positive for constipation. Negative for nausea and vomiting.   Neurological: Positive for weakness.     Objective:     Vital Signs (Most Recent):  Temp: 97.9 °F (36.6 °C) (02/10/17 0800)  Pulse: 101 (02/10/17 0800)  Resp: 20 (02/10/17 0800)  BP: (!) 114/57 (02/10/17 0800)  SpO2: (!) 93 % (02/10/17 0827) Vital Signs (24h Range):  Temp:  [97.6 °F (36.4 °C)-98.6 °F (37 °C)] 97.9 °F (36.6 °C)  Pulse:  [100-105] 101  Resp:  [20-22] 20  SpO2:  [92 %-93 %] 93 %  BP: ()/(53-71) 114/57     Weight: 89.8 kg (198 lb)  Body mass index is 27.62 kg/(m^2).    Intake/Output Summary (Last 24 hours) at 02/10/17 1145  Last data filed at 02/10/17 0659   Gross per 24 hour   Intake          3937.08 ml   Output              900 ml   Net          " 3037.08 ml      Physical Exam   Constitutional: He is oriented to person, place, and time. He appears well-developed and well-nourished. No distress.   Eyes: No scleral icterus.   Neck: No JVD present.   Cardiovascular: Normal rate and regular rhythm.    Pulmonary/Chest: Effort normal and breath sounds normal. No respiratory distress. He has no wheezes.   Port in right upper chest.    Abdominal: Soft. Bowel sounds are normal. He exhibits distension. There is no tenderness.   Musculoskeletal: He exhibits no edema.   Neurological: He is alert and oriented to person, place, and time.   Skin: Skin is warm and dry. He is not diaphoretic.   Psychiatric: He has a normal mood and affect.   Nursing note and vitals reviewed.      Significant Labs:   CBC:   Recent Labs  Lab 02/09/17  0606 02/10/17  0712   WBC 21.31* 8.86   HGB 13.8* 13.9*   HCT 41.9 41.8   PLT 62* 53*     CMP:   Recent Labs  Lab 02/09/17  0605 02/10/17  0712   * 129*   K 4.1 4.1    99   CO2 22* 23   * 93   BUN 24* 29*   CREATININE 1.3 1.0   CALCIUM 7.7* 7.7*   PROT 4.8* 4.8*   ALBUMIN 1.7* 1.7*   BILITOT 5.3* 4.7*   ALKPHOS 270* 255*   AST 75* 98*   ALT 28 34   ANIONGAP 7* 7*   EGFRNONAA >60 >60     Urine Studies:   Recent Labs  Lab 02/09/17  1129   COLORU [place]*   APPEARANCEUA Hazy*   PHUR 5.0   SPECGRAV >=1.030*   PROTEINUA 2+*   GLUCUA Trace*   KETONESU 1+*   BILIRUBINUA 3+*   OCCULTUA Trace*   NITRITE Positive*   UROBILINOGEN 1.0   LEUKOCYTESUR Negative   RBCUA 0   WBCUA 15*   BACTERIA Occasional   SQUAMEPITHEL 6   HYALINECASTS 0       Significant Imaging:     Imaging Results         X-Ray Chest PA And Lateral (Final result) Result time:  02/09/17 10:57:07    Final result by Rob Real MD (02/09/17 10:57:07)    Impression:     Minimal atelectasis at the left lung base.      Electronically signed by: ROB REAL MD  Date:     02/09/17  Time:    10:57     Narrative:    PA and lateral chest radiographs      Comparison:  "6/27/16    Results: There is a port in the right upper thorax, distal tip in the SVC.  The cardiac silhouette is normal in size, midline.  The pulmonary vascularity is normal.  There is a small band of opacity noted in the left lung base suggestive of minimal atelectasis, no definite large area of focal airspace consolidation.  No pleural effusion.  No pneumothorax.                  Assessment/Plan:      Acute cholangitis  Biliary obstruction due to cancer   Abnormal liver enzymes  Elevated bilirubin  Hyponatremia   Tbili 4.7 today, down from 8.2 on admission.  Seen by GI who has no intervention planned: "Suspect mild cholangitis but no evidence for stent occlusion. Continue antibiotics and trend liver tests but no role for ERCP at this time."     Continue IVFs; received zosyn x 3 days. Started on ceftriaxone and metronidazole, day #1.  Avoid hepatotoxins.  Daily BMP and LFTs.       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 Calzada. Outpatient work-up planned.  Continue pain management. On warfarin for portal vein thrombosis.  INR 2.7today.  Holding home coumadin for thrombocytopenia.  Continue daily INRs.       Acute cystitis without hematuria  Noted hypotension, tachycardia, WBC 21K on 2/9. Pt denies cough or fever at home. C/O inability to urinate "normally" and no BM for past 4 days. Ordered sepsis work-up including UA (Positive nitrite, 15 WBC and occasional bacteria) , lactic acid (2.0), CXR (minimal atelectasis at left lung base) and blood cultures (no growth x 1 day). Continue IVF. Pt looks and feels better.  Continue ceftriaxone, day #1.     HTN (hypertension), benign  Holding lisinopril due to hypotension.  Continue to monitor, resume if needed. SBP .       Chemotherapy-induced thrombocytopenia  Platelet count 53 today, possibly 2/2 sepsis. No visible, active bleeding. Continue hold on coumadin. Continue to " monitor.      Anxiety  Mood stable. PRN xanax.      VTE Risk Mitigation         Ordered     High Risk of VTE  Once      02/08/17 0035     Place sequential compression device  Until discontinued      02/08/17 0035     Place RENATA hose  Until discontinued      02/08/17 0035          Kylee Carbajal PA-C  Department of [facility]   [facility]  Pager: [phone]    Attending: Dale Valera MD

## 2017-02-10 NOTE — PROGRESS NOTES
"[facility]    CC: Hyperbilirubinemia    HPI 56 y.o. male with metastatic colon cancer with biliary stricture s/p stent placed 2/4 by Dr. Reyna.     Denies abdominal pain. Reports decreased appetite and nausea this morning. No fevers since admission. Reports dysuria.     Past Medical History   Diagnosis Date    Arthritis      sacroilitis    Cancer 1999     colon cancer    Cataract     Colon cancer     Degenerative disc disease     Foot pain     GERD (gastroesophageal reflux disease)     Hypertension          Review of Systems  General ROS: negative for chills, fever or weight loss  Cardiovascular ROS: no chest pain or dyspnea on exertion  Gastrointestinal ROS: no worsening of chronic abdominal pain, no hematemesis, no blood in stool    Physical Examination  Visit Vitals    BP (!) 114/57 (BP Location: Left arm, Patient Position: Sitting, BP Method: Manual)    Pulse 101    Temp 97.9 °F (36.6 °C) (Oral)    Resp 20    Ht 5' 11" (1.803 m)    Wt 89.8 kg (198 lb)    SpO2 (!) 93%    BMI 27.62 kg/m2     General appearance: alert, cooperative, fatigued appearing  HENT: Normocephalic, atraumatic, neck symmetrical, no nasal discharge   Lungs: clear to auscultation bilaterally, no dullness to percussion bilaterally  Heart: regular rate and rhythm without rub; no displacement of the PMI   Abdomen: moderate distention, soft, BS present  Extremities: extremities symmetric; no clubbing, cyanosis, or edema  Neurologic: Alert and oriented X 3, normal strength, normal coordination and gait    Labs:  Lab Results   Component Value Date    WBC 8.86 02/10/2017    HGB 13.9 (L) 02/10/2017    HCT 41.8 02/10/2017    MCV 98 02/10/2017    PLT 53 (L) 02/10/2017     CMP  Sodium   Date Value Ref Range Status   02/09/2017 129 (L) 136 - 145 mmol/L Final     Potassium   Date Value Ref Range Status   02/09/2017 4.1 3.5 - 5.1 mmol/L Final     Chloride   Date Value Ref Range Status   02/09/2017 100 95 - 110 mmol/L Final     CO2 "   Date Value Ref Range Status   02/09/2017 22 (L) 23 - 29 mmol/L Final     Glucose   Date Value Ref Range Status   02/09/2017 115 (H) 70 - 110 mg/dL Final     BUN, Bld   Date Value Ref Range Status   02/09/2017 24 (H) 6 - 20 mg/dL Final     Creatinine   Date Value Ref Range Status   02/09/2017 1.3 0.5 - 1.4 mg/dL Final     Calcium   Date Value Ref Range Status   02/09/2017 7.7 (L) 8.7 - 10.5 mg/dL Final     Total Protein   Date Value Ref Range Status   02/09/2017 4.8 (L) 6.0 - 8.4 g/dL Final     Albumin   Date Value Ref Range Status   02/09/2017 1.7 (L) 3.5 - 5.2 g/dL Final     Total Bilirubin   Date Value Ref Range Status   02/09/2017 5.3 (H) 0.1 - 1.0 mg/dL Final     Comment:     For infants and newborns, interpretation of results should be based  on gestational age, weight and in agreement with clinical  observations.  Premature Infant recommended reference ranges:  Up to 24 hours.............<8.0 mg/dL  Up to 48 hours............<12.0 mg/dL  3-5 days..................<15.0 mg/dL  6-29 days.................<15.0 mg/dL       Alkaline Phosphatase   Date Value Ref Range Status   02/09/2017 270 (H) 55 - 135 U/L Final     AST   Date Value Ref Range Status   02/09/2017 75 (H) 10 - 40 U/L Final     ALT   Date Value Ref Range Status   02/09/2017 28 10 - 44 U/L Final     Anion Gap   Date Value Ref Range Status   02/09/2017 7 (L) 8 - 16 mmol/L Final     eGFR if    Date Value Ref Range Status   02/09/2017 >60 >60 mL/min/1.73 m^2 Final     eGFR if non    Date Value Ref Range Status   02/09/2017 >60 >60 mL/min/1.73 m^2 Final     Comment:     Calculation used to obtain the estimated glomerular filtration  rate (eGFR) is the CKD-EPI equation. Since race is unknown   in our information system, the eGFR values for   -American and Non--American patients are given   for each creatinine result.           Imaging:  CXR- clear lungs    Assessment:   57 yo male with metastatic colon cancer  causing biliary obstruction s/p recent biliary stent, presented from oncology clinic with weakness. Suspect presentation due to sepsis likely from UTI, denies abdominal pain, bilirubin trending down.    Plan:  -Continue treatment for sepsis with IV antibiotics  -As bilirubin continues to improve do not believe presentation is related to biliary stent dysfunction  -Daily CBC, CMP, PT/INR  -Hold Coumadin      Merari Mcleod MD  [facility] PGY - 4

## 2017-02-10 NOTE — PROGRESS NOTES
Pre-Visit Chart Review  For Appointment Scheduled on 2/13/17    Health Maintenance Due   Topic Date Due    Pneumococcal PCV13 (High Risk) (1 - PCV13 Required) 07[birth year]02/1962    TETANUS VACCINE  07/02/1979    Pneumococcal PPSV23 (High Risk) (1) 07/02/1979    Influenza Vaccine  08/01/2016

## 2017-02-10 NOTE — ASSESSMENT & PLAN NOTE
Portal vein thrombosis   H/O colon cancer  Oncologist Dr. Calzada. Outpatient work-up planned.  Continue pain management. On warfarin for portal vein thrombosis.  INR 2.7today.  Holding home coumadin for thrombocytopenia.  Continue daily INRs.

## 2017-02-10 NOTE — ASSESSMENT & PLAN NOTE
"Biliary obstruction due to cancer   Abnormal liver enzymes  Elevated bilirubin  Hyponatremia   Tbili 4.7 today, down from 8.2 on admission.  Seen by GI who has no intervention planned: "Suspect mild cholangitis but no evidence for stent occlusion. Continue antibiotics and trend liver tests but no role for ERCP at this time."     Continue IVFs; received zosyn x 3 days. Started on ceftriaxone and metronidazole, day #1.  Avoid hepatotoxins.  Daily BMP and LFTs.     "

## 2017-02-10 NOTE — ASSESSMENT & PLAN NOTE
Platelet count 53 today, possibly 2/2 sepsis. No visible, active bleeding. Continue hold on coumadin. Continue to monitor.

## 2017-02-11 PROBLEM — R18.8 ASCITES: Status: ACTIVE | Noted: 2017-02-11

## 2017-02-11 PROBLEM — N30.00 ACUTE CYSTITIS WITHOUT HEMATURIA: Status: RESOLVED | Noted: 2017-02-09 | Resolved: 2017-02-11

## 2017-02-11 PROBLEM — K70.31 ASCITES DUE TO ALCOHOLIC CIRRHOSIS: Status: ACTIVE | Noted: 2017-02-11

## 2017-02-11 PROBLEM — I95.0 IDIOPATHIC HYPOTENSION: Status: RESOLVED | Noted: 2017-02-09 | Resolved: 2017-02-11

## 2017-02-11 LAB
ALBUMIN SERPL BCP-MCNC: 1.6 G/DL
ALP SERPL-CCNC: 281 U/L
ALT SERPL W/O P-5'-P-CCNC: 29 U/L
ANION GAP SERPL CALC-SCNC: 8 MMOL/L
AST SERPL-CCNC: 79 U/L
BASOPHILS # BLD AUTO: 0.01 K/UL
BASOPHILS NFR BLD: 0.1 %
BILIRUB DIRECT SERPL-MCNC: 3.6 MG/DL
BILIRUB SERPL-MCNC: 4.6 MG/DL
BUN SERPL-MCNC: 27 MG/DL
CALCIUM SERPL-MCNC: 7.5 MG/DL
CHLORIDE SERPL-SCNC: 100 MMOL/L
CO2 SERPL-SCNC: 19 MMOL/L
CREAT SERPL-MCNC: 0.8 MG/DL
DIFFERENTIAL METHOD: ABNORMAL
EOSINOPHIL # BLD AUTO: 0.1 K/UL
EOSINOPHIL NFR BLD: 0.7 %
ERYTHROCYTE [DISTWIDTH] IN BLOOD BY AUTOMATED COUNT: 16.1 %
EST. GFR  (AFRICAN AMERICAN): >60 ML/MIN/1.73 M^2
EST. GFR  (NON AFRICAN AMERICAN): >60 ML/MIN/1.73 M^2
GLUCOSE SERPL-MCNC: 91 MG/DL
HCT VFR BLD AUTO: 42 %
HGB BLD-MCNC: 14.1 G/DL
INR PPP: 3.1
LYMPHOCYTES # BLD AUTO: 1.6 K/UL
LYMPHOCYTES NFR BLD: 16 %
MCH RBC QN AUTO: 32.6 PG
MCHC RBC AUTO-ENTMCNC: 33.6 %
MCV RBC AUTO: 97 FL
MONOCYTES # BLD AUTO: 1.5 K/UL
MONOCYTES NFR BLD: 15.3 %
NEUTROPHILS # BLD AUTO: 6.6 K/UL
NEUTROPHILS NFR BLD: 67.7 %
PLATELET # BLD AUTO: 52 K/UL
PMV BLD AUTO: 10.8 FL
POTASSIUM SERPL-SCNC: 4 MMOL/L
PROT SERPL-MCNC: 4.7 G/DL
PROTHROMBIN TIME: 31.5 SEC
RBC # BLD AUTO: 4.33 M/UL
SODIUM SERPL-SCNC: 127 MMOL/L
WBC # BLD AUTO: 9.69 K/UL

## 2017-02-11 PROCEDURE — 80076 HEPATIC FUNCTION PANEL: CPT

## 2017-02-11 PROCEDURE — 36415 COLL VENOUS BLD VENIPUNCTURE: CPT

## 2017-02-11 PROCEDURE — 25000003 PHARM REV CODE 250: Performed by: NURSE PRACTITIONER

## 2017-02-11 PROCEDURE — 80048 BASIC METABOLIC PNL TOTAL CA: CPT

## 2017-02-11 PROCEDURE — 25000003 PHARM REV CODE 250: Performed by: PHYSICIAN ASSISTANT

## 2017-02-11 PROCEDURE — 25000003 PHARM REV CODE 250: Performed by: HOSPITALIST

## 2017-02-11 PROCEDURE — 85025 COMPLETE CBC W/AUTO DIFF WBC: CPT

## 2017-02-11 PROCEDURE — 99232 SBSQ HOSP IP/OBS MODERATE 35: CPT | Mod: ,,, | Performed by: INTERNAL MEDICINE

## 2017-02-11 PROCEDURE — 85610 PROTHROMBIN TIME: CPT

## 2017-02-11 PROCEDURE — 63600175 PHARM REV CODE 636 W HCPCS: Performed by: NURSE PRACTITIONER

## 2017-02-11 PROCEDURE — 63600175 PHARM REV CODE 636 W HCPCS: Performed by: HOSPITALIST

## 2017-02-11 PROCEDURE — 94761 N-INVAS EAR/PLS OXIMETRY MLT: CPT

## 2017-02-11 PROCEDURE — 11000001 HC ACUTE MED/SURG PRIVATE ROOM

## 2017-02-11 RX ORDER — TAMSULOSIN HYDROCHLORIDE 0.4 MG/1
0.4 CAPSULE ORAL DAILY
Status: DISCONTINUED | OUTPATIENT
Start: 2017-02-11 | End: 2017-02-13

## 2017-02-11 RX ORDER — FUROSEMIDE 10 MG/ML
40 INJECTION INTRAMUSCULAR; INTRAVENOUS ONCE
Status: COMPLETED | OUTPATIENT
Start: 2017-02-11 | End: 2017-02-11

## 2017-02-11 RX ORDER — LACTULOSE 10 G/15ML
15 SOLUTION ORAL 2 TIMES DAILY
Status: DISCONTINUED | OUTPATIENT
Start: 2017-02-11 | End: 2017-02-12

## 2017-02-11 RX ORDER — FUROSEMIDE 20 MG/1
20 TABLET ORAL DAILY
Status: DISCONTINUED | OUTPATIENT
Start: 2017-02-12 | End: 2017-02-12

## 2017-02-11 RX ADMIN — STANDARDIZED SENNA CONCENTRATE AND DOCUSATE SODIUM 1 TABLET: 8.6; 5 TABLET, FILM COATED ORAL at 08:02

## 2017-02-11 RX ADMIN — PANTOPRAZOLE SODIUM 20 MG: 20 TABLET, DELAYED RELEASE ORAL at 08:02

## 2017-02-11 RX ADMIN — METRONIDAZOLE 500 MG: 500 TABLET ORAL at 02:02

## 2017-02-11 RX ADMIN — AMITRIPTYLINE HYDROCHLORIDE 10 MG: 10 TABLET, FILM COATED ORAL at 08:02

## 2017-02-11 RX ADMIN — FUROSEMIDE 40 MG: 10 INJECTION, SOLUTION INTRAMUSCULAR; INTRAVENOUS at 11:02

## 2017-02-11 RX ADMIN — OXYCODONE HYDROCHLORIDE 30 MG: 5 TABLET ORAL at 05:02

## 2017-02-11 RX ADMIN — SODIUM CHLORIDE: 0.9 INJECTION, SOLUTION INTRAVENOUS at 05:02

## 2017-02-11 RX ADMIN — PROMETHAZINE HYDROCHLORIDE 6.25 MG: 25 INJECTION INTRAMUSCULAR; INTRAVENOUS at 08:02

## 2017-02-11 RX ADMIN — TAMSULOSIN HYDROCHLORIDE 0.4 MG: 0.4 CAPSULE ORAL at 06:02

## 2017-02-11 RX ADMIN — ONDANSETRON 8 MG: 8 TABLET, ORALLY DISINTEGRATING ORAL at 12:02

## 2017-02-11 RX ADMIN — CEFTRIAXONE 1 G: 1 INJECTION, SOLUTION INTRAVENOUS at 11:02

## 2017-02-11 RX ADMIN — OXYCODONE HYDROCHLORIDE 30 MG: 5 TABLET ORAL at 08:02

## 2017-02-11 RX ADMIN — LACTULOSE 15 G: 10 SOLUTION ORAL at 08:02

## 2017-02-11 RX ADMIN — METRONIDAZOLE 500 MG: 500 TABLET ORAL at 05:02

## 2017-02-11 RX ADMIN — LACTULOSE 15 G: 10 SOLUTION ORAL at 02:02

## 2017-02-11 NOTE — SUBJECTIVE & OBJECTIVE
Interval History: Pt states feels about the same.  +BM after suppository yesterday. Required in/out cath again this morning for urinary retention. Urine culture No Growth. Suspect patient has increasing ascites in his abdomen. Ordered ultrasound to assess.  INR continues to increase despite no coumadin administration. Pt denies CP, SOB.      Review of Systems   Constitutional: Positive for fatigue. Negative for chills and fever.   Respiratory: Negative for cough and shortness of breath.    Cardiovascular: Negative for chest pain.   Gastrointestinal: Positive for abdominal distention and constipation. Negative for abdominal pain, nausea and vomiting.   Genitourinary: Positive for difficulty urinating.   Musculoskeletal: Positive for back pain.   Neurological: Positive for weakness.     Objective:     Vital Signs (Most Recent):  Temp: 97.7 °F (36.5 °C) (02/11/17 0855)  Pulse: 107 (02/11/17 0855)  Resp: 19 (02/11/17 0855)  BP: 115/66 (02/11/17 0855)  SpO2: (!) 93 % (02/11/17 0852) Vital Signs (24h Range):  Temp:  [96.6 °F (35.9 °C)-98.1 °F (36.7 °C)] 97.7 °F (36.5 °C)  Pulse:  [] 107  Resp:  [19-20] 19  SpO2:  [92 %-94 %] 93 %  BP: ()/(54-67) 115/66     Weight: 89.8 kg (198 lb)  Body mass index is 27.62 kg/(m^2).    Intake/Output Summary (Last 24 hours) at 02/11/17 1232  Last data filed at 02/11/17 0537   Gross per 24 hour   Intake          2879.16 ml   Output             1000 ml   Net          1879.16 ml      Physical Exam   Constitutional: He is oriented to person, place, and time. He appears well-developed and well-nourished. No distress.   Eyes: No scleral icterus.   Neck: No JVD present.   Cardiovascular: Normal rate and regular rhythm.    Pulmonary/Chest: Effort normal and breath sounds normal. No respiratory distress. He has no wheezes.   Port in right upper chest.    Abdominal: Soft. Bowel sounds are normal. He exhibits distension. There is no tenderness.   Musculoskeletal: He exhibits no edema.    Neurological: He is alert and oriented to person, place, and time.   Skin: Skin is warm and dry. He is not diaphoretic.   Yellow tinge to skin.    Psychiatric: He has a normal mood and affect. His behavior is normal.   Nursing note and vitals reviewed.      Significant Labs:   CBC:   Recent Labs  Lab 02/10/17  0712 02/11/17  0459   WBC 8.86 9.69   HGB 13.9* 14.1   HCT 41.8 42.0   PLT 53* 52*     CMP:   Recent Labs  Lab 02/10/17  0712 02/11/17  0459   * 127*   K 4.1 4.0   CL 99 100   CO2 23 19*   GLU 93 91   BUN 29* 27*   CREATININE 1.0 0.8   CALCIUM 7.7* 7.5*   PROT 4.8* 4.7*   ALBUMIN 1.7* 1.6*   BILITOT 4.7* 4.6*   ALKPHOS 255* 281*   AST 98* 79*   ALT 34 29   ANIONGAP 7* 8   EGFRNONAA >60 >60     Coagulation:   Recent Labs  Lab 02/11/17 0459   INR 3.1*       Significant Imaging: no new. Abdominal ultrasound ordered.

## 2017-02-11 NOTE — ASSESSMENT & PLAN NOTE
Portal vein thrombosis   H/O colon cancer  Hyponatremia   Ascites   Pt's abdomen appears more distended. Abdominal ultrasound ordered to assess. Stopped IVF and giving 1 dose of furosemide.  INR 3.1 today despite receiving no coumadin for several days.  Sodium decreased to 127. Patient with increasing urinary retention. Suspect worsening liver problems.  Oncologist Dr. Calzada. Outpatient work-up planned.  Continue pain management. Was on coumadin for portal vein thrombosis.  Holding for thrombocytopenia and supratherapeutic INR.  Continue daily INRs.

## 2017-02-11 NOTE — PROGRESS NOTES
[facility] Medicine  Progress Note    Patient Name: Berlin Villa  MRN: [id number]  Patient Class: IP- Inpatient   Admission Date: 2/8/2017  Length of Stay: 3 days  Attending Physician: Dale Valera MD  Primary Care Provider: Luis M Ruelas MD        Subjective:     Principal Problem:Acute cholangitis    HPI:  Berlin Villa is a 56 y.o.  male with with hypertension, anxiety, gastroesophageal reflux disease, metastatic colon cancer (originally diagnosed in 2010) to the liver (May 2016) that is currently undergoing chemotherapy with hematologist-oncologist Dr. Karoline Calzada. He lives in [place], [place]. His primary care physician is Dr. Luis M Ruelas. He was recently hospitalized at [facility] 1/31/17-2/4/17 for biliary obstruction due to his cancer. He had ERCP with biliary stent placement performed by gastroenterologist Dr. Josh Reyna on 2/03/17 and was discharged later that evening to follow up in clinic in 2-4 weeks.     He was seen by Dr. Calzada in clinic on 2/07/17. He complained of increased weakness and fatigue, accompanied by nausea, vomiting, poor appetite, and back pain (chronic). He was noted to have hypotension and tachycardia during the office visit.  He was sent to [facility] ED for evaluation. He had systolic blood pressure in 80s-90s, HR 110s. Tbili was 8.2 (6.7 on 2/4/17),  (263), AST 93 (57), ALT 33 (17), WBC 11.2 (8.95). The ED physician spoke with Dr. Reyna, who recommended IV fluids and IV antibiotics and to discharge him home from the ED to follow up in clinic the following day with him. He was given 2 liters normal saline bolus, metoclopramide 10 mg IV, and piperacillin-tazobactam 4.5 grams. Due to weakness and fatigue, the ED physician did not feel patient safe discharging him home. He was accepted for transfer and admission to [facility]. He was transferred to [facility].       Hospital Course:  Transferred from  "[facility] for evaluation by GI for biliary obstruction.  Seen by GI on 2/8 who suggests: "56 year old male s/p stent for biliary stricture here with malaise and persistent hyperbilirubinemia, although down from yesterday. Suspect mild cholangitis but no evidence for stent occlusion. Continue antibiotics and trend liver tests but no role for ERCP at this time.  On 2/9, Noted hypotension and tachycardia overnight.  WBC increased to 21K today with BILLIE.  Work-up for sepsis.  Urinalysis suggests UTI.  Given 1L fluid bolus.  Lactic acid 2.0.  Today, 2/10, WBC back to normal and patient looks and feels better.  Urine gram stain shows No WBC's, epithelial cells or organisms. T Bili continues to improve.  Antibiotics adjusted by Pharmacist for more targeted coverage. 2/11 Urine culture shows no growth.  Abdomen more distended with increasing INR despite receiving no coumadin. Liver is not functioning properly likely 2/2 cancer.  Ordered abdominal ultrasound to assess for ascites for paracentesis.           Interval History: Pt states feels about the same.  +BM after suppository yesterday. Required in/out cath again this morning for urinary retention. Urine culture No Growth. Suspect patient has increasing ascites in his abdomen. Ordered ultrasound to assess.  INR continues to increase despite no coumadin administration. Pt denies CP, SOB.      Review of Systems   Constitutional: Positive for fatigue. Negative for chills and fever.   Respiratory: Negative for cough and shortness of breath.    Cardiovascular: Negative for chest pain.   Gastrointestinal: Positive for abdominal distention and constipation. Negative for abdominal pain, nausea and vomiting.   Genitourinary: Positive for difficulty urinating.   Musculoskeletal: Positive for back pain.   Neurological: Positive for weakness.     Objective:     Vital Signs (Most Recent):  Temp: 97.7 °F (36.5 °C) (02/11/17 0855)  Pulse: 107 (02/11/17 0855)  Resp: 19 (02/11/17 " 0855)  BP: 115/66 (02/11/17 0855)  SpO2: (!) 93 % (02/11/17 0852) Vital Signs (24h Range):  Temp:  [96.6 °F (35.9 °C)-98.1 °F (36.7 °C)] 97.7 °F (36.5 °C)  Pulse:  [] 107  Resp:  [19-20] 19  SpO2:  [92 %-94 %] 93 %  BP: ()/(54-67) 115/66     Weight: 89.8 kg (198 lb)  Body mass index is 27.62 kg/(m^2).    Intake/Output Summary (Last 24 hours) at 02/11/17 1232  Last data filed at 02/11/17 0537   Gross per 24 hour   Intake          2879.16 ml   Output             1000 ml   Net          1879.16 ml      Physical Exam   Constitutional: He is oriented to person, place, and time. He appears well-developed and well-nourished. No distress.   Eyes: No scleral icterus.   Neck: No JVD present.   Cardiovascular: Normal rate and regular rhythm.    Pulmonary/Chest: Effort normal and breath sounds normal. No respiratory distress. He has no wheezes.   Port in right upper chest.    Abdominal: Soft. Bowel sounds are normal. He exhibits distension. There is no tenderness.   Musculoskeletal: He exhibits no edema.   Neurological: He is alert and oriented to person, place, and time.   Skin: Skin is warm and dry. He is not diaphoretic.   Yellow tinge to skin.    Psychiatric: He has a normal mood and affect. His behavior is normal.   Nursing note and vitals reviewed.      Significant Labs:   CBC:   Recent Labs  Lab 02/10/17  0712 02/11/17 0459   WBC 8.86 9.69   HGB 13.9* 14.1   HCT 41.8 42.0   PLT 53* 52*     CMP:   Recent Labs  Lab 02/10/17  0712 02/11/17 0459   * 127*   K 4.1 4.0   CL 99 100   CO2 23 19*   GLU 93 91   BUN 29* 27*   CREATININE 1.0 0.8   CALCIUM 7.7* 7.5*   PROT 4.8* 4.7*   ALBUMIN 1.7* 1.6*   BILITOT 4.7* 4.6*   ALKPHOS 255* 281*   AST 98* 79*   ALT 34 29   ANIONGAP 7* 8   EGFRNONAA >60 >60     Coagulation:   Recent Labs  Lab 02/11/17  0459   INR 3.1*       Significant Imaging: no new. Abdominal ultrasound ordered.     Assessment/Plan:      * Acute cholangitis  Biliary obstruction due to cancer, biliary  "stent placement 2/3/17  Abnormal liver enzymes  Elevated bilirubin  Tbili 4.6 today, down from 8.2 on admission.  Seen by GI who has no intervention planned: "Suspect mild cholangitis but no evidence for stent occlusion. Continue antibiotics and trend liver tests but no role for ERCP at this time."     Discontinued IVFs.  Received zosyn x 3 days. Started on ceftriaxone and metronidazole, day #2.  Avoid hepatotoxins.  Daily BMP and LFTs.       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 Abdominal ultrasound ordered to assess. Stopped IVF and giving 1 dose of furosemide.  INR 3.1 today despite receiving no coumadin for several days.  Sodium decreased to 127. Patient with increasing urinary retention. Suspect worsening liver problems.  Oncologist Dr. Calzada. Outpatient work-up planned.  Continue pain management. Was on coumadin for portal vein thrombosis.  Holding for thrombocytopenia and supratherapeutic INR.  Continue daily INRs.       HTN (hypertension), benign  Holding lisinopril due to hypotension.  Continue to monitor, resume if needed. SBP .       Chemotherapy-induced thrombocytopenia  Platelet count 52 today. No visible, active bleeding. Continue hold on coumadin. Continue to monitor.      Anxiety  Mood stable. PRN xanax.      VTE Risk Mitigation         Ordered     High Risk of VTE  Once      02/08/17 0035     Place sequential compression device  Until discontinued      02/08/17 0035     Place RENATA hose  Until discontinued      02/08/17 0035          Kylee Carbajal PA-C  Department of Hospital Medicine   [facility]  Pager: [phone]    Attending: Dale Valera MD    "

## 2017-02-11 NOTE — PROGRESS NOTES
[facility]  Gastroenterology  Progress Note    Patient Name: Berlin Villa  MRN: [id number]  Admission Date: 2/8/2017  Hospital Length of Stay: 3 days  Code Status: Full Code   Attending Provider: Dale Valera MD  Consulting Provider: Josh Reyna MD  Primary Care Physician: Luis M Ruelas MD  Principal Problem: Sepsis associated hypotension    Last Recorded LACE+ Scores     LACE+ Score      60 at 02/08 0009              Subjective:   Colon Cancer Metastatic to liver , cholestasis . Has UTI .  Interval History:     Review of Systems   Constitutional: Positive for appetite change, chills and fever. Negative for activity change.   Respiratory: Negative for cough, chest tightness, shortness of breath and wheezing.    Cardiovascular: Negative for chest pain and leg swelling.   Gastrointestinal: Negative for abdominal distention, constipation, diarrhea, nausea, rectal pain and vomiting.   Genitourinary: Negative for dysuria, hematuria and urgency.   Skin: Negative for pallor and rash.   Neurological: Negative for dizziness, tremors, seizures, weakness, light-headedness and numbness.   Hematological: Negative for adenopathy.   Psychiatric/Behavioral: Negative for agitation, behavioral problems and confusion. The patient is not nervous/anxious.      Objective:     Vital Signs (Most Recent):  Temp: 97.7 °F (36.5 °C) (02/11/17 0855)  Pulse: 107 (02/11/17 0855)  Resp: 19 (02/11/17 0855)  BP: 115/66 (02/11/17 0855)  SpO2: (!) 93 % (02/11/17 0852) Vital Signs (24h Range):  Temp:  [96.6 °F (35.9 °C)-98.1 °F (36.7 °C)] 97.7 °F (36.5 °C)  Pulse:  [] 107  Resp:  [19-20] 19  SpO2:  [92 %-94 %] 93 %  BP: ()/(54-67) 115/66     Weight: 89.8 kg (198 lb) (02/08/17 0038)  Body mass index is 27.62 kg/(m^2).      Intake/Output Summary (Last 24 hours) at 02/11/17 1240  Last data filed at 02/11/17 0537   Gross per 24 hour   Intake          2879.16 ml   Output             1000 ml   Net          1879.16 ml        Lines/Drains/Airways     Central Venous Catheter Line                 Port A Cath Single Lumen right subclavian -- days         Port A Cath Single Lumen 06/27/16 1312 right subclavian 228 days          Peripheral Intravenous Line                 Peripheral IV - Single Lumen 02/07/17 1618 Left Forearm 3 days                Physical Exam   Constitutional: He is oriented to person, place, and time. He appears well-developed and well-nourished. No distress.   HENT:   Head: Normocephalic and atraumatic.   Eyes: EOM are normal. Left eye exhibits no discharge. Scleral icterus is present.   Neck: Normal range of motion. Neck supple. No JVD present. No tracheal deviation present.   Cardiovascular: Normal rate and regular rhythm.  Exam reveals no gallop and no friction rub.    Pulmonary/Chest: Effort normal. He has no wheezes. He has no rales.   Abdominal: Soft. Bowel sounds are normal. He exhibits no mass. There is no tenderness.   Musculoskeletal: Normal range of motion. He exhibits no tenderness or deformity.   Neurological: He is alert and oriented to person, place, and time. He displays normal reflexes. Coordination normal.   Skin: No rash noted. No pallor.   Psychiatric: He has a normal mood and affect.   depressed   Nursing note and vitals reviewed.      Significant Labs:      reviewed  Significant Imaging:  reviewed    Assessment/Plan:     Active Diagnoses:    Diagnosis Date Noted POA    Ascites due to alcoholic cirrhosis [K70.31] 02/11/2017 Yes    Idiopathic hypotension [I95.0] 02/09/2017 Yes    Hyperbilirubinemia [E80.6] 02/08/2017 Yes    Acute cholangitis [K83.0] 02/08/2017 Yes    Hyponatremia [E87.1] 02/07/2017 Yes    History of biliary duct stent placement 2/03/17 [Z98.890] 02/03/2017 Not Applicable    Portal vein thrombosis [I81] 02/01/2017 Yes    Abnormal liver enzymes [R74.8] 01/31/2017 Yes    Elevated bilirubin [R17] 01/31/2017 Yes    Biliary obstruction due to cancer [K83.1] 01/31/2017 Yes     Chemotherapy-induced thrombocytopenia [D69.59, T45.1X5A] 10/31/2016 Yes    Anxiety [F41.9] 08/09/2016 Yes    Metastasis to liver [C78.7] 06/23/2016 Yes     Chronic    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] 06/23/2016 Yes     Chronic    Fatty liver [K76.0] 05/03/2016 Yes     Chronic    History of colon cancer [Z85.038] 04/25/2016 Yes     Chronic    HTN (hypertension), benign [I10] 11/30/2012 Yes     Chronic    GERD (gastroesophageal reflux disease) [K21.9]  Yes     Chronic      Problems Resolved During this Admission:    Diagnosis Date Noted Date Resolved POA    PRINCIPAL PROBLEM:  Sepsis associated hypotension [A41.9] 02/09/2017 02/10/2017 Yes    Acute cystitis without hematuria [N30.00] 02/09/2017 02/11/2017 Yes       VTE Risk Mitigation         Ordered     High Risk of VTE  Once      02/08/17 0035     Place sequential compression device  Until discontinued      02/08/17 0035     Place RENATA hose  Until discontinued      02/08/17 0035      metastatic colon cancer to liver   Cholestasis   No response to intrahepatioc stent   UTI   D/W family and Dr. Calzada   Thank you for your consult.     Josh Reyna MD  Gastroenterology  [facility]

## 2017-02-11 NOTE — ASSESSMENT & PLAN NOTE
Platelet count 52 today. No visible, active bleeding. Continue hold on coumadin. Continue to monitor.

## 2017-02-11 NOTE — PLAN OF CARE
Problem: Patient Care Overview  Goal: Plan of Care Review  Room air SpO2   94%. Pt with no apparent distress noted. Will continue to monitor.

## 2017-02-11 NOTE — PLAN OF CARE
Problem: Fluid Volume Deficit (Adult)  Goal: Identify Related Risk Factors and Signs and Symptoms  Related risk factors and signs and symptoms are identified upon initiation of Human Response Clinical Practice Guideline (CPG)  Outcome: Ongoing (interventions implemented as appropriate)  Alteration in comfort due to fluid retention. Patient in bed alert and oriented at this time. Patient has marked distention in abdomen. Patient has ascites with jaundice to face and trunk. Patient having poor output at this time,. Voiding per urinal, small amounts of urine.  Continues on fluids at this time, instructed patient to watch the amount of fluid intake due to ascites. Will continue  To observe.

## 2017-02-11 NOTE — ASSESSMENT & PLAN NOTE
"Biliary obstruction due to cancer, biliary stent placement 2/3/17  Abnormal liver enzymes  Elevated bilirubin  Tbili 4.6 today, down from 8.2 on admission.  Seen by GI who has no intervention planned: "Suspect mild cholangitis but no evidence for stent occlusion. Continue antibiotics and trend liver tests but no role for ERCP at this time."     Discontinued IVFs.  Received zosyn x 3 days. Started on ceftriaxone and metronidazole, day #2.  Avoid hepatotoxins.  Daily BMP and LFTs.     "

## 2017-02-12 PROBLEM — R33.9 URINARY RETENTION WITH INCOMPLETE BLADDER EMPTYING: Status: ACTIVE | Noted: 2017-02-12

## 2017-02-12 LAB
ALBUMIN SERPL BCP-MCNC: 1.7 G/DL
ALP SERPL-CCNC: 401 U/L
ALT SERPL W/O P-5'-P-CCNC: 43 U/L
AMMONIA PLAS-SCNC: 49 UMOL/L
ANION GAP SERPL CALC-SCNC: 12 MMOL/L
AST SERPL-CCNC: 116 U/L
BASOPHILS # BLD AUTO: 0.05 K/UL
BASOPHILS NFR BLD: 0.5 %
BILIRUB DIRECT SERPL-MCNC: 3.3 MG/DL
BILIRUB SERPL-MCNC: 5 MG/DL
BUN SERPL-MCNC: 35 MG/DL
CALCIUM SERPL-MCNC: 8.1 MG/DL
CHLORIDE SERPL-SCNC: 100 MMOL/L
CO2 SERPL-SCNC: 14 MMOL/L
CREAT SERPL-MCNC: 0.9 MG/DL
DIFFERENTIAL METHOD: ABNORMAL
EOSINOPHIL # BLD AUTO: 0 K/UL
EOSINOPHIL NFR BLD: 0.1 %
ERYTHROCYTE [DISTWIDTH] IN BLOOD BY AUTOMATED COUNT: 16.1 %
EST. GFR  (AFRICAN AMERICAN): >60 ML/MIN/1.73 M^2
EST. GFR  (NON AFRICAN AMERICAN): >60 ML/MIN/1.73 M^2
GLUCOSE SERPL-MCNC: 118 MG/DL
HCT VFR BLD AUTO: 45.2 %
HGB BLD-MCNC: 15.6 G/DL
INR PPP: 3.6
LYMPHOCYTES # BLD AUTO: 1 K/UL
LYMPHOCYTES NFR BLD: 8.9 %
MCH RBC QN AUTO: 32.6 PG
MCHC RBC AUTO-ENTMCNC: 34.5 %
MCV RBC AUTO: 95 FL
MONOCYTES # BLD AUTO: 1.2 K/UL
MONOCYTES NFR BLD: 11 %
NEUTROPHILS # BLD AUTO: 8.6 K/UL
NEUTROPHILS NFR BLD: 77.5 %
PLATELET # BLD AUTO: 55 K/UL
PMV BLD AUTO: 11.1 FL
POTASSIUM SERPL-SCNC: 5.2 MMOL/L
PROT SERPL-MCNC: 5.2 G/DL
PROTHROMBIN TIME: 36.8 SEC
RBC # BLD AUTO: 4.78 M/UL
SODIUM SERPL-SCNC: 126 MMOL/L
WBC # BLD AUTO: 11.07 K/UL

## 2017-02-12 PROCEDURE — 63600175 PHARM REV CODE 636 W HCPCS: Performed by: HOSPITALIST

## 2017-02-12 PROCEDURE — 82140 ASSAY OF AMMONIA: CPT

## 2017-02-12 PROCEDURE — 80076 HEPATIC FUNCTION PANEL: CPT

## 2017-02-12 PROCEDURE — 94761 N-INVAS EAR/PLS OXIMETRY MLT: CPT

## 2017-02-12 PROCEDURE — 25000003 PHARM REV CODE 250: Performed by: HOSPITALIST

## 2017-02-12 PROCEDURE — 25000003 PHARM REV CODE 250: Performed by: NURSE PRACTITIONER

## 2017-02-12 PROCEDURE — 80048 BASIC METABOLIC PNL TOTAL CA: CPT

## 2017-02-12 PROCEDURE — 85610 PROTHROMBIN TIME: CPT

## 2017-02-12 PROCEDURE — 25000003 PHARM REV CODE 250: Performed by: PHYSICIAN ASSISTANT

## 2017-02-12 PROCEDURE — 85025 COMPLETE CBC W/AUTO DIFF WBC: CPT

## 2017-02-12 PROCEDURE — 63600175 PHARM REV CODE 636 W HCPCS: Performed by: NURSE PRACTITIONER

## 2017-02-12 PROCEDURE — 11000001 HC ACUTE MED/SURG PRIVATE ROOM

## 2017-02-12 PROCEDURE — 36415 COLL VENOUS BLD VENIPUNCTURE: CPT

## 2017-02-12 PROCEDURE — P9045 ALBUMIN (HUMAN), 5%, 250 ML: HCPCS | Performed by: HOSPITALIST

## 2017-02-12 RX ORDER — ALBUMIN HUMAN 50 G/1000ML
25 SOLUTION INTRAVENOUS ONCE
Status: DISCONTINUED | OUTPATIENT
Start: 2017-02-12 | End: 2017-02-12

## 2017-02-12 RX ORDER — PHYTONADIONE 5 MG/1
5 TABLET ORAL ONCE
Status: COMPLETED | OUTPATIENT
Start: 2017-02-12 | End: 2017-02-12

## 2017-02-12 RX ORDER — ALBUMIN HUMAN 50 G/1000ML
25 SOLUTION INTRAVENOUS EVERY 6 HOURS
Status: DISCONTINUED | OUTPATIENT
Start: 2017-02-12 | End: 2017-02-15 | Stop reason: HOSPADM

## 2017-02-12 RX ADMIN — PROMETHAZINE HYDROCHLORIDE 6.25 MG: 25 INJECTION INTRAMUSCULAR; INTRAVENOUS at 06:02

## 2017-02-12 RX ADMIN — AMITRIPTYLINE HYDROCHLORIDE 10 MG: 10 TABLET, FILM COATED ORAL at 09:02

## 2017-02-12 RX ADMIN — PROMETHAZINE HYDROCHLORIDE 6.25 MG: 25 INJECTION INTRAMUSCULAR; INTRAVENOUS at 08:02

## 2017-02-12 RX ADMIN — FUROSEMIDE 20 MG: 20 TABLET ORAL at 09:02

## 2017-02-12 RX ADMIN — ALBUMIN (HUMAN) 25 G: 12.5 SOLUTION INTRAVENOUS at 01:02

## 2017-02-12 RX ADMIN — ALPRAZOLAM 0.25 MG: 0.25 TABLET ORAL at 11:02

## 2017-02-12 RX ADMIN — METRONIDAZOLE 500 MG: 500 TABLET ORAL at 05:02

## 2017-02-12 RX ADMIN — ALBUMIN (HUMAN) 25 G: 12.5 SOLUTION INTRAVENOUS at 06:02

## 2017-02-12 RX ADMIN — CEFTRIAXONE 1 G: 1 INJECTION, SOLUTION INTRAVENOUS at 12:02

## 2017-02-12 RX ADMIN — RAMELTEON 8 MG: 8 TABLET, FILM COATED ORAL at 09:02

## 2017-02-12 RX ADMIN — TAMSULOSIN HYDROCHLORIDE 0.4 MG: 0.4 CAPSULE ORAL at 09:02

## 2017-02-12 RX ADMIN — STANDARDIZED SENNA CONCENTRATE AND DOCUSATE SODIUM 1 TABLET: 8.6; 5 TABLET, FILM COATED ORAL at 09:02

## 2017-02-12 RX ADMIN — LACTULOSE 15 G: 10 SOLUTION ORAL at 09:02

## 2017-02-12 RX ADMIN — ONDANSETRON 8 MG: 8 TABLET, ORALLY DISINTEGRATING ORAL at 06:02

## 2017-02-12 RX ADMIN — MORPHINE SULFATE 2 MG: 2 INJECTION, SOLUTION INTRAMUSCULAR; INTRAVENOUS at 09:02

## 2017-02-12 RX ADMIN — PHYTONADIONE 5 MG: 5 TABLET ORAL at 01:02

## 2017-02-12 RX ADMIN — METRONIDAZOLE 500 MG: 500 TABLET ORAL at 09:02

## 2017-02-12 RX ADMIN — PANTOPRAZOLE SODIUM 20 MG: 20 TABLET, DELAYED RELEASE ORAL at 09:02

## 2017-02-12 RX ADMIN — METRONIDAZOLE 500 MG: 500 TABLET ORAL at 01:02

## 2017-02-12 NOTE — ASSESSMENT & PLAN NOTE
Pt c/o urinary retention on admission. States that he has had difficulty urinating for weeks.  No evidence of UTI.  No hydronephrosis seen on imaging and renal function is normal except for elevated BUN likely 2/2 dehydration.  PRN bladder scans and in/out catheter. Will likely place an indwelling catheter at discharge and have patient see Urology as an outpatient.

## 2017-02-12 NOTE — PLAN OF CARE
Problem: Pain, Chronic (Adult)  Goal: Identify Related Risk Factors and Signs and Symptoms  Related risk factors and signs and symptoms are identified upon initiation of Human Response Clinical Practice Guideline (CPG)   Alteration in comfort due to abdominal pain. Patient in bed family at bedside, Patient medicated with hs medications and pain medication. Patient medicated with phenergan 6.25 IVPB. Patient relieved at this time, at sleeping at intervals. Will continue to observe as needed.

## 2017-02-12 NOTE — ASSESSMENT & PLAN NOTE
"Portal vein thrombosis   H/O colon cancer  Hyponatremia   Ascites   Pt's abdomen appears more distended. Abdominal ultrasound ordered to assess and shows same amount "mild to moderate" of ascites as seen on MRI 1/31/2017. Stopped IVF and started on furosemide.  INR 3.1 yesterday despite receiving no coumadin for several days.  Sodium decreased to 126. Patient with  urinary retention. Suspect worsening liver problems.  Oncologist is Dr. Calzada. Outpatient work-up planned.  Continue pain management. Was on coumadin for portal vein thrombosis.  Discontineued for persistent thrombocytopenia and supratherapeutic INR.  Continue daily labs. Started on lactulose for hepatic encephalopathy - ammonia is 49 and family states he is "forgetful".      "

## 2017-02-12 NOTE — PROGRESS NOTES
[facility] Medicine  Progress Note    Patient Name: Berlin Villa  MRN: [id number]  Patient Class: IP- Inpatient   Admission Date: 2/8/2017  Length of Stay: 4 days  Attending Physician: Dale Valera MD  Primary Care Provider: Luis M Ruelas MD        Subjective:     Principal Problem:Acute cholangitis    HPI:  Berlin Villa is a 56 y.o.  male with with hypertension, anxiety, gastroesophageal reflux disease, metastatic colon cancer (originally diagnosed in 2010) to the liver (May 2016) that is currently undergoing chemotherapy with hematologist-oncologist Dr. Karoline Calzada. He lives in [place], [place]. His primary care physician is Dr. Luis M Ruelas. He was recently hospitalized at [facility] 1/31/17-2/4/17 for biliary obstruction due to his cancer. He had ERCP with biliary stent placement performed by gastroenterologist Dr. Josh Reyna on 2/03/17 and was discharged later that evening to follow up in clinic in 2-4 weeks.     He was seen by Dr. Calzada in clinic on 2/07/17. He complained of increased weakness and fatigue, accompanied by nausea, vomiting, poor appetite, and back pain (chronic). He was noted to have hypotension and tachycardia during the office visit.  He was sent to [facility] ED for evaluation. He had systolic blood pressure in 80s-90s, HR 110s. Tbili was 8.2 (6.7 on 2/4/17),  (263), AST 93 (57), ALT 33 (17), WBC 11.2 (8.95). The ED physician spoke with Dr. Reyna, who recommended IV fluids and IV antibiotics and to discharge him home from the ED to follow up in clinic the following day with him. He was given 2 liters normal saline bolus, metoclopramide 10 mg IV, and piperacillin-tazobactam 4.5 grams. Due to weakness and fatigue, the ED physician did not feel patient safe discharging him home. He was accepted for transfer and admission to [facility]. He was transferred to [facility].       Hospital Course:  Transferred from  "[facility] for evaluation by GI for biliary obstruction.  Seen by GI on 2/8 who suggests: "56 year old male s/p stent for biliary stricture here with malaise and persistent hyperbilirubinemia, although down from yesterday. Suspect mild cholangitis but no evidence for stent occlusion. Continue antibiotics and trend liver tests but no role for ERCP at this time.  On 2/9, Noted hypotension and tachycardia overnight.  WBC increased to 21K today with BILLIE.  Work-up for sepsis.  Urinalysis suggests UTI.  Given 1L fluid bolus.  Lactic acid 2.0.  Today, 2/10, WBC back to normal and patient looks and feels better.  Urine gram stain shows No WBC's, epithelial cells or organisms. T Bili continues to improve.  Antibiotics adjusted by Pharmacist for more targeted coverage. 2/11 Urine culture shows no growth.  Abdomen more distended with increasing INR despite receiving no coumadin. Liver is not functioning properly likely 2/2 cancer.  Ordered abdominal ultrasound to assess for ascites for paracentesis which shows "mild to moderate amount of ascites, similar to MRI from 1/31/2017".           Interval History: Pt states feels the same or slightly worse. Vomited last night.  Still requiring intermittent catheterization for urinary retention. Still has poor PO intake 2/2 no appetite.  +BM with lactulose. Denies CP, SOB.     Review of Systems   Constitutional: Positive for fatigue. Negative for chills and fever.   Respiratory: Negative for cough and shortness of breath.    Cardiovascular: Negative for chest pain.   Gastrointestinal: Positive for abdominal distention, nausea and vomiting. Negative for abdominal pain.   Genitourinary: Positive for difficulty urinating.   Musculoskeletal: Positive for back pain.   Neurological: Positive for weakness.     Objective:     Vital Signs (Most Recent):  Temp: 96.4 °F (35.8 °C) (02/12/17 0800)  Pulse: 104 (02/12/17 0800)  Resp: 20 (02/12/17 0800)  BP: 107/67 (02/12/17 0800)  SpO2: 95 % " (02/12/17 0609) Vital Signs (24h Range):  Temp:  [96.4 °F (35.8 °C)-99.4 °F (37.4 °C)] 96.4 °F (35.8 °C)  Pulse:  [] 104  Resp:  [18-20] 20  SpO2:  [92 %-96 %] 95 %  BP: ()/(47-82) 107/67     Weight: 89.8 kg (198 lb)  Body mass index is 27.62 kg/(m^2).    Intake/Output Summary (Last 24 hours) at 02/12/17 1133  Last data filed at 02/11/17 1800   Gross per 24 hour   Intake                0 ml   Output              450 ml   Net             -450 ml      Physical Exam   Constitutional: He appears well-developed and well-nourished. No distress.   Eyes: Scleral icterus is present.   Neck: No JVD present.   Cardiovascular: Normal rate and regular rhythm.    Pulmonary/Chest: Effort normal and breath sounds normal. No respiratory distress. He has no wheezes.   Port in right upper chest.    Abdominal: Soft. Bowel sounds are normal. He exhibits distension. There is no tenderness.   Musculoskeletal: He exhibits no edema.   Neurological: He is alert.   Skin: Skin is warm and dry. He is not diaphoretic.   Yellow tinge to skin.    Psychiatric: He has a normal mood and affect. His behavior is normal.   Nursing note and vitals reviewed.      Significant Labs:   CBC:   Recent Labs  Lab 02/11/17  0459 02/12/17  0946   WBC 9.69 11.07   HGB 14.1 15.6   HCT 42.0 45.2   PLT 52* 55*     CMP:   Recent Labs  Lab 02/11/17  0459 02/12/17  0946   * 126*   K 4.0 5.2*    100   CO2 19* 14*   GLU 91 118*   BUN 27* 35*   CREATININE 0.8 0.9   CALCIUM 7.5* 8.1*   PROT 4.7* 5.2*   ALBUMIN 1.6* 1.7*   BILITOT 4.6* 5.0*   ALKPHOS 281* 401*   AST 79* 116*   ALT 29 43   ANIONGAP 8 12   EGFRNONAA >60 >60       Significant Imaging:     US Abdomen Complete 2/8/2017  1.  Cirrhotic appearing liver with ascites.  The amount of ascites is thought to be similar to the MRI from 01/31/2017.    2.  Sludge noted within the gallbladder.    Assessment/Plan:      * Acute cholangitis  Biliary obstruction due to cancer, biliary stent placement  "2/3/17  Abnormal liver enzymes  Elevated bilirubin  Tbili 5.0 today, increased from yesterday but still down from 8.2 on admission.  Alk Phos and AST are worse today. Seen by GI who has no intervention planned: "Suspect mild cholangitis but no evidence for stent occlusion. Continue antibiotics and trend liver tests but no role for ERCP at this time."     Discontinued IVFs.  Received zosyn x 3 days. Started on ceftriaxone and metronidazole, day #3.  Avoid hepatotoxins.  Daily BMP and LFTs.       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 Abdominal ultrasound ordered to assess and shows same amount "mild to moderate" of ascites as seen on MRI 1/31/2017. Stopped IVF and started on furosemide.  INR 3.1 yesterday despite receiving no coumadin for several days.  Sodium decreased to 126. Patient with  urinary retention. Suspect worsening liver problems.  Oncologist is Dr. Calzada. Outpatient work-up planned.  Continue pain management. Was on coumadin for portal vein thrombosis.  Discontineued for persistent thrombocytopenia and supratherapeutic INR.  Continue daily labs. Started on lactulose for hepatic encephalopathy - ammonia is 49 and family states he is "forgetful".        HTN (hypertension), benign  Holding lisinopril due to hypotension. Will discontinue at discharge.  Continue to monitor. SBP .        Urinary retention with incomplete bladder emptying  Pt c/o urinary retention on admission. States that he has had difficulty urinating for weeks.  No evidence of UTI.  No hydronephrosis seen on imaging and renal function is normal except for elevated BUN likely 2/2 dehydration.  PRN bladder scans and in/out catheter. Will likely place an indwelling catheter at discharge and have patient see Urology as an outpatient.        Chemotherapy-induced thrombocytopenia  Platelet count 55 today. No " visible, active bleeding. Discontinued home coumadin. Continue to monitor.      Anxiety  Mood stable. PRN xanax.      VTE Risk Mitigation         Ordered     High Risk of VTE  Once      02/08/17 0035     Place sequential compression device  Until discontinued      02/08/17 0035     Place RENATA hose  Until discontinued      02/08/17 0035          Kylee Carbajal PA-C  Department of Hospital Medicine   [facility]  Pager: [phone]    Attending: Dale Valera MD

## 2017-02-12 NOTE — PLAN OF CARE
Problem: Patient Care Overview  Goal: Plan of Care Review  Outcome: Ongoing (interventions implemented as appropriate)  Reviewed plan of care with pt. Will continue to monitor for pain and nausea. No true red alarms on tele. Safety measures are in place, bed low and in lock position, call light in reach, bed alarm is on, and family remains at the bedside. Pt verbalizes full understanding of their plan of care.

## 2017-02-12 NOTE — SUBJECTIVE & OBJECTIVE
Interval History: Pt states feels the same or slightly worse. Vomited last night.  Still requiring intermittent catheterization for urinary retention. Still has poor PO intake 2/2 no appetite.  +BM with lactulose. Denies CP, SOB.     Review of Systems   Constitutional: Positive for fatigue. Negative for chills and fever.   Respiratory: Negative for cough and shortness of breath.    Cardiovascular: Negative for chest pain.   Gastrointestinal: Positive for abdominal distention, nausea and vomiting. Negative for abdominal pain.   Genitourinary: Positive for difficulty urinating.   Musculoskeletal: Positive for back pain.   Neurological: Positive for weakness.     Objective:     Vital Signs (Most Recent):  Temp: 96.4 °F (35.8 °C) (02/12/17 0800)  Pulse: 104 (02/12/17 0800)  Resp: 20 (02/12/17 0800)  BP: 107/67 (02/12/17 0800)  SpO2: 95 % (02/12/17 0609) Vital Signs (24h Range):  Temp:  [96.4 °F (35.8 °C)-99.4 °F (37.4 °C)] 96.4 °F (35.8 °C)  Pulse:  [] 104  Resp:  [18-20] 20  SpO2:  [92 %-96 %] 95 %  BP: ()/(47-82) 107/67     Weight: 89.8 kg (198 lb)  Body mass index is 27.62 kg/(m^2).    Intake/Output Summary (Last 24 hours) at 02/12/17 1133  Last data filed at 02/11/17 1800   Gross per 24 hour   Intake                0 ml   Output              450 ml   Net             -450 ml      Physical Exam   Constitutional: He appears well-developed and well-nourished. No distress.   Eyes: Scleral icterus is present.   Neck: No JVD present.   Cardiovascular: Normal rate and regular rhythm.    Pulmonary/Chest: Effort normal and breath sounds normal. No respiratory distress. He has no wheezes.   Port in right upper chest.    Abdominal: Soft. Bowel sounds are normal. He exhibits distension. There is no tenderness.   Musculoskeletal: He exhibits no edema.   Neurological: He is alert.   Skin: Skin is warm and dry. He is not diaphoretic.   Yellow tinge to skin.    Psychiatric: He has a normal mood and affect. His behavior  is normal.   Nursing note and vitals reviewed.      Significant Labs:   CBC:   Recent Labs  Lab 02/11/17  0459 02/12/17  0946   WBC 9.69 11.07   HGB 14.1 15.6   HCT 42.0 45.2   PLT 52* 55*     CMP:   Recent Labs  Lab 02/11/17  0459 02/12/17  0946   * 126*   K 4.0 5.2*    100   CO2 19* 14*   GLU 91 118*   BUN 27* 35*   CREATININE 0.8 0.9   CALCIUM 7.5* 8.1*   PROT 4.7* 5.2*   ALBUMIN 1.6* 1.7*   BILITOT 4.6* 5.0*   ALKPHOS 281* 401*   AST 79* 116*   ALT 29 43   ANIONGAP 8 12   EGFRNONAA >60 >60       Significant Imaging:     US Abdomen Complete 2/8/2017  1.  Cirrhotic appearing liver with ascites.  The amount of ascites is thought to be similar to the MRI from 01/31/2017.    2.  Sludge noted within the gallbladder.

## 2017-02-12 NOTE — ASSESSMENT & PLAN NOTE
"Biliary obstruction due to cancer, biliary stent placement 2/3/17  Abnormal liver enzymes  Elevated bilirubin  Tbili 5.0 today, increased from yesterday but still down from 8.2 on admission.  Alk Phos and AST are worse today. Seen by GI who has no intervention planned: "Suspect mild cholangitis but no evidence for stent occlusion. Continue antibiotics and trend liver tests but no role for ERCP at this time."     Discontinued IVFs.  Received zosyn x 3 days. Started on ceftriaxone and metronidazole, day #3.  Avoid hepatotoxins.  Daily BMP and LFTs.     "

## 2017-02-12 NOTE — ASSESSMENT & PLAN NOTE
Platelet count 55 today. No visible, active bleeding. Discontinued home coumadin. Continue to monitor.

## 2017-02-12 NOTE — ASSESSMENT & PLAN NOTE
Holding lisinopril due to hypotension. Will discontinue at discharge.  Continue to monitor. SBP .

## 2017-02-13 ENCOUNTER — TELEPHONE (OUTPATIENT)
Dept: HEMATOLOGY/ONCOLOGY | Facility: CLINIC | Age: 57
End: 2017-02-13

## 2017-02-13 LAB
ALBUMIN SERPL BCP-MCNC: 3 G/DL
ALP SERPL-CCNC: 276 U/L
ALT SERPL W/O P-5'-P-CCNC: 30 U/L
ANION GAP SERPL CALC-SCNC: 11 MMOL/L
AST SERPL-CCNC: 79 U/L
BASOPHILS # BLD AUTO: 0 K/UL
BASOPHILS NFR BLD: 0 %
BILIRUB DIRECT SERPL-MCNC: 3.8 MG/DL
BILIRUB SERPL-MCNC: 5 MG/DL
BUN SERPL-MCNC: 31 MG/DL
CALCIUM SERPL-MCNC: 6.7 MG/DL
CHLORIDE SERPL-SCNC: 102 MMOL/L
CO2 SERPL-SCNC: 17 MMOL/L
CREAT SERPL-MCNC: 0.8 MG/DL
DIFFERENTIAL METHOD: ABNORMAL
EOSINOPHIL # BLD AUTO: 0 K/UL
EOSINOPHIL NFR BLD: 0.1 %
ERYTHROCYTE [DISTWIDTH] IN BLOOD BY AUTOMATED COUNT: 16.2 %
EST. GFR  (AFRICAN AMERICAN): >60 ML/MIN/1.73 M^2
EST. GFR  (NON AFRICAN AMERICAN): >60 ML/MIN/1.73 M^2
GLUCOSE SERPL-MCNC: 78 MG/DL
HCT VFR BLD AUTO: 34.1 %
HGB BLD-MCNC: 11.2 G/DL
INR PPP: 2.1
LYMPHOCYTES # BLD AUTO: 1 K/UL
LYMPHOCYTES NFR BLD: 12.9 %
MCH RBC QN AUTO: 31.5 PG
MCHC RBC AUTO-ENTMCNC: 32.8 %
MCV RBC AUTO: 96 FL
MONOCYTES # BLD AUTO: 0.9 K/UL
MONOCYTES NFR BLD: 11.7 %
NEUTROPHILS # BLD AUTO: 5.7 K/UL
NEUTROPHILS NFR BLD: 75 %
PLATELET # BLD AUTO: 46 K/UL
PMV BLD AUTO: 10.8 FL
POTASSIUM SERPL-SCNC: 3.2 MMOL/L
PROT SERPL-MCNC: 4.9 G/DL
PROTHROMBIN TIME: 22 SEC
RBC # BLD AUTO: 3.56 M/UL
SODIUM SERPL-SCNC: 130 MMOL/L
WBC # BLD AUTO: 7.54 K/UL

## 2017-02-13 PROCEDURE — 99233 SBSQ HOSP IP/OBS HIGH 50: CPT | Mod: ,,, | Performed by: INTERNAL MEDICINE

## 2017-02-13 PROCEDURE — 25000003 PHARM REV CODE 250: Performed by: NURSE PRACTITIONER

## 2017-02-13 PROCEDURE — 25000003 PHARM REV CODE 250: Performed by: HOSPITALIST

## 2017-02-13 PROCEDURE — 85025 COMPLETE CBC W/AUTO DIFF WBC: CPT

## 2017-02-13 PROCEDURE — 11000001 HC ACUTE MED/SURG PRIVATE ROOM

## 2017-02-13 PROCEDURE — 85610 PROTHROMBIN TIME: CPT

## 2017-02-13 PROCEDURE — 36415 COLL VENOUS BLD VENIPUNCTURE: CPT

## 2017-02-13 PROCEDURE — 80048 BASIC METABOLIC PNL TOTAL CA: CPT

## 2017-02-13 PROCEDURE — P9045 ALBUMIN (HUMAN), 5%, 250 ML: HCPCS | Performed by: HOSPITALIST

## 2017-02-13 PROCEDURE — 25000003 PHARM REV CODE 250: Performed by: PHYSICIAN ASSISTANT

## 2017-02-13 PROCEDURE — 63600175 PHARM REV CODE 636 W HCPCS: Performed by: NURSE PRACTITIONER

## 2017-02-13 PROCEDURE — 80076 HEPATIC FUNCTION PANEL: CPT

## 2017-02-13 PROCEDURE — 63600175 PHARM REV CODE 636 W HCPCS: Performed by: HOSPITALIST

## 2017-02-13 PROCEDURE — 94761 N-INVAS EAR/PLS OXIMETRY MLT: CPT

## 2017-02-13 RX ORDER — DEXTROSE MONOHYDRATE AND SODIUM CHLORIDE 5; .45 G/100ML; G/100ML
INJECTION, SOLUTION INTRAVENOUS CONTINUOUS
Status: DISCONTINUED | OUTPATIENT
Start: 2017-02-13 | End: 2017-02-14

## 2017-02-13 RX ORDER — METRONIDAZOLE 500 MG/100ML
500 INJECTION, SOLUTION INTRAVENOUS
Status: COMPLETED | OUTPATIENT
Start: 2017-02-13 | End: 2017-02-14

## 2017-02-13 RX ORDER — LACTULOSE 10 G/15ML
20 SOLUTION ORAL DAILY
Status: DISCONTINUED | OUTPATIENT
Start: 2017-02-14 | End: 2017-02-15

## 2017-02-13 RX ORDER — ZOLPIDEM TARTRATE 5 MG/1
5 TABLET ORAL ONCE
Status: COMPLETED | OUTPATIENT
Start: 2017-02-13 | End: 2017-02-13

## 2017-02-13 RX ADMIN — CEFTRIAXONE 1 G: 1 INJECTION, SOLUTION INTRAVENOUS at 11:02

## 2017-02-13 RX ADMIN — MORPHINE SULFATE 4 MG: 2 INJECTION, SOLUTION INTRAMUSCULAR; INTRAVENOUS at 06:02

## 2017-02-13 RX ADMIN — STANDARDIZED SENNA CONCENTRATE AND DOCUSATE SODIUM 1 TABLET: 8.6; 5 TABLET, FILM COATED ORAL at 09:02

## 2017-02-13 RX ADMIN — ALPRAZOLAM 0.25 MG: 0.25 TABLET ORAL at 06:02

## 2017-02-13 RX ADMIN — METRONIDAZOLE 500 MG: 500 SOLUTION INTRAVENOUS at 06:02

## 2017-02-13 RX ADMIN — MORPHINE SULFATE 2 MG: 2 INJECTION, SOLUTION INTRAMUSCULAR; INTRAVENOUS at 09:02

## 2017-02-13 RX ADMIN — MORPHINE SULFATE 4 MG: 2 INJECTION, SOLUTION INTRAMUSCULAR; INTRAVENOUS at 10:02

## 2017-02-13 RX ADMIN — PROMETHAZINE HYDROCHLORIDE 6.25 MG: 25 INJECTION INTRAMUSCULAR; INTRAVENOUS at 05:02

## 2017-02-13 RX ADMIN — PROMETHAZINE HYDROCHLORIDE 6.25 MG: 25 INJECTION INTRAMUSCULAR; INTRAVENOUS at 08:02

## 2017-02-13 RX ADMIN — AMITRIPTYLINE HYDROCHLORIDE 10 MG: 10 TABLET, FILM COATED ORAL at 09:02

## 2017-02-13 RX ADMIN — MORPHINE SULFATE 4 MG: 2 INJECTION, SOLUTION INTRAMUSCULAR; INTRAVENOUS at 05:02

## 2017-02-13 RX ADMIN — TAMSULOSIN HYDROCHLORIDE 0.4 MG: 0.4 CAPSULE ORAL at 09:02

## 2017-02-13 RX ADMIN — ZOLPIDEM TARTRATE 5 MG: 5 TABLET, FILM COATED ORAL at 01:02

## 2017-02-13 RX ADMIN — PANTOPRAZOLE SODIUM 20 MG: 20 TABLET, DELAYED RELEASE ORAL at 09:02

## 2017-02-13 RX ADMIN — ALBUMIN (HUMAN) 25 G: 12.5 SOLUTION INTRAVENOUS at 12:02

## 2017-02-13 RX ADMIN — ALBUMIN (HUMAN) 25 G: 12.5 SOLUTION INTRAVENOUS at 01:02

## 2017-02-13 RX ADMIN — DEXTROSE AND SODIUM CHLORIDE: 5; .45 INJECTION, SOLUTION INTRAVENOUS at 01:02

## 2017-02-13 RX ADMIN — ALBUMIN (HUMAN) 25 G: 12.5 SOLUTION INTRAVENOUS at 06:02

## 2017-02-13 NOTE — PLAN OF CARE
Problem: Patient Care Overview  Goal: Plan of Care Review  Outcome: Ongoing (interventions implemented as appropriate)  Plan of care reviewed with pt. Pt verbalizes understanding. Bed in lowest position, side rails up times 2, wheels locked, nonslip socks on, and bed alarm on. Call bell w/in reach. Instructed to call for needs/assist oob. Family at bedside. Pt reports vomiting after any po pill administration. Pt premedicated with prn phenergan prior to scheduled 2100 and 2200 po meds. Pt vomited small amt of clear emesis after administration of pills. No pills noted in emesis. Pt reports improvement in ability to take pills after prn phenergan administration. Pt given prn xanax, rozerem, and morphine per md order. Pt given one time dose of ambien to help pt sleep. Respirations even and unlabored. No distress noted. No c/o sob t/o night. Pt on telemetry. SR-ST, 90's-low 100's noted. No true red alarms noted. Will continue to monitor.

## 2017-02-13 NOTE — PLAN OF CARE
Problem: Patient Care Overview  Goal: Plan of Care Review  Saturations were as noted in charting.  No signs of respiratory distress.  Will continue to monitor.

## 2017-02-13 NOTE — PROGRESS NOTES
"[facility]    CC: hyperbilirubinemia    HPI 56 y.o. male with past medical history of colon cancer with metastatic disease to the liver with likely malignant biliary stricture s/p ERCP with stent placement on 2/3/17 with Dr. Reyna who presented with acute, progressive, worsening of his hyperbilirubinemia with associated generalized abdominal pain, and malaise.    This morning he reports less nausea and vomiting.  He continues to have a poor appetite.    Previous records reviewed.    Collateral information obtained from family present at the bedside.      Past Medical History   Diagnosis Date    Arthritis      sacroilitis    Cancer 1999     colon cancer    Cataract     Colon cancer     Degenerative disc disease     Foot pain     GERD (gastroesophageal reflux disease)     Hypertension          Review of Systems  General ROS: negative for chills, fever or weight loss  Cardiovascular ROS: no chest pain or dyspnea on exertion  Gastrointestinal ROS: positive for abdominal distension, mild abdominal discomfort, intermittent nausea and vomiting    Physical Examination  Visit Vitals    /66    Pulse 101    Temp 97 °F (36.1 °C) (Oral)    Resp 20    Ht 5' 11" (1.803 m)    Wt 89.8 kg (198 lb)    SpO2 (!) 93%    BMI 27.62 kg/m2     General appearance: alert, cooperative, no distress  HENT: Normocephalic, atraumatic, neck symmetrical, no nasal discharge .  Positive for scleral icterus  Lungs: clear to auscultation bilaterally, no dullness to percussion bilaterally  Heart: regular rate and rhythm without rub; no displacement of the PMI   Abdomen: soft, distended, hypoactive bowel sounds, no rebound or guarding  Extremities: extremities symmetric; no clubbing, cyanosis, or edema  Neurologic: Alert and oriented X 3, moves all four extremities, intact sensation to light touch    Labs:  H/H 11.2/34  Plt 46  INR 2.1  TB 5      Imaging: MRCP 1/31/17 Right hepatic lobe mass with associated biliary " obstruction    I have independently reviewed his imaging study    Assessment: The patient is a 57 yo man with past medical history of metastatic colon cancer with biliary obstruction likely secondary to malignant stricture s/p ERCP on 2/3 with stenting of his stricture.  He presented on 2/7 with fatigue and found to have an elevated total bilirubin and alk phos likely representing cholestasis following stent placement.  His total bilirubin has remained stable with some improvement in his alk phos.    Plan:  Continue to monitor hepatic function profile daily while the patient remains inpatient.    Continue antibiotic coverage against gram negative and anaerobes.    Plan for outpatient ERCP for stent removal in about 7 weeks.    Lary Vargas  [employer] GI Fellow PGY V

## 2017-02-13 NOTE — TELEPHONE ENCOUNTER
----- Message from RT Eddie sent at 2/13/2017  2:14 PM CST -----  Contact: Jamel (Son) [phone]   Jamel (Son) [phone], requesting to inform the pt will be discharged tomorrow form [facility] [place], [place], before the pt's discharge the doctor's would like to know what next step should they take, thanks.

## 2017-02-13 NOTE — PLAN OF CARE
Problem: Patient Care Overview  Goal: Plan of Care Review  Outcome: Ongoing (interventions implemented as appropriate)  Reviewed plan of care with pt and family. Pt will be NPO at midnight for EGD tomorrow. No true red alarms on tele. Safety measures are in place, bed low and in lock position, call light in reach, bed alarm is on, and family remains at the bedside. Pt verbalizes full understanding of their plan of care.

## 2017-02-13 NOTE — NURSING
Enma Patrick notified of bp 82/42 manually. Gave ok to administer pain medication. No parameters were ordered.

## 2017-02-13 NOTE — PROGRESS NOTES
[facility]  Gastroenterology  Progress Note    Patient Name: Berlin Villa  MRN: [id number]  Admission Date: 2/8/2017  Hospital Length of Stay: 5 days  Code Status: Full Code   Attending Provider: Dale Valera MD  Consulting Provider: Josh Reyna MD  Primary Care Physician: Luis M Ruelas MD  Principal Problem: Acute cholangitis    Last Recorded LACE+ Scores     LACE+ Score      60 at 02/08 0009              Subjective:   Metastatic colon cancer / cholestasis   Interval History:   Review of Systems   Constitutional: Positive for activity change, appetite change, fatigue and fever.   HENT: Negative for drooling, trouble swallowing and voice change.    Respiratory: Negative for cough and choking.    Cardiovascular: Negative for chest pain.   Gastrointestinal: Negative for abdominal distention, abdominal pain, blood in stool, nausea and vomiting.   Genitourinary: Negative for flank pain, frequency, hematuria and urgency.   Musculoskeletal: Negative for neck pain and neck stiffness.   Skin: Negative for pallor and rash.   Allergic/Immunologic: Negative for environmental allergies.   Neurological: Negative for tremors, seizures, weakness and numbness.   Hematological: Negative for adenopathy.   Psychiatric/Behavioral: Negative for confusion. The patient is not nervous/anxious.      Objective:     Vital Signs (Most Recent):  Temp: 97.5 °F (36.4 °C) (02/13/17 1200)  Pulse: 101 (02/13/17 1200)  Resp: 18 (02/13/17 1200)  BP: 107/62 (02/13/17 1200)  SpO2: (!) 92 % (02/13/17 1205) Vital Signs (24h Range):  Temp:  [97 °F (36.1 °C)-97.7 °F (36.5 °C)] 97.5 °F (36.4 °C)  Pulse:  [] 101  Resp:  [18-21] 18  SpO2:  [92 %-97 %] 92 %  BP: ()/(47-66) 107/62     Weight: 89.8 kg (198 lb) (02/08/17 0038)  Body mass index is 27.62 kg/(m^2).      Intake/Output Summary (Last 24 hours) at 02/13/17 1403  Last data filed at 02/13/17 0632   Gross per 24 hour   Intake             1220 ml   Output               200 ml   Net             1020 ml       Lines/Drains/Airways     Central Venous Catheter Line                 Port A Cath Single Lumen right subclavian -- days         Port A Cath Single Lumen 06/27/16 1312 right subclavian 231 days          Drain                 Urethral Catheter 02/12/17 1845 Double-lumen less than 1 day          Peripheral Intravenous Line                 Peripheral IV - Single Lumen 02/07/17 1618 Left Forearm 5 days                Physical Exam   Constitutional: He is oriented to person, place, and time. He appears well-developed.   malnourished   HENT:   Head: Atraumatic.   Eyes: EOM are normal. Left eye exhibits no discharge. Scleral icterus is present.   Neck: Neck supple. No JVD present. No tracheal deviation present.   Cardiovascular: Normal rate and regular rhythm.  Exam reveals no friction rub.    No murmur heard.  Pulmonary/Chest: Effort normal. He has no wheezes. He has no rales.   Abdominal: He exhibits no distension and no mass. No hernia.   Musculoskeletal: Normal range of motion. He exhibits no tenderness or deformity.   Neurological: He is alert and oriented to person, place, and time. He displays normal reflexes. Coordination normal.   Skin: No rash noted. No pallor.   Psychiatric: Thought content normal.   depressed       Significant Labs:   reviewed     Significant Imaging:  reviewed  Assessment/Plan:     Active Diagnoses:    Diagnosis Date Noted POA    PRINCIPAL PROBLEM:  Acute cholangitis [K83.0] 02/08/2017 Yes    Urinary retention with incomplete bladder emptying [R33.9] 02/12/2017 Yes    Ascites [R18.8] 02/11/2017 Yes    Hyperbilirubinemia [E80.6] 02/08/2017 Yes    Hyponatremia [E87.1] 02/07/2017 Yes    History of biliary duct stent placement 2/03/17 [Z98.890] 02/03/2017 Not Applicable    Portal vein thrombosis [I81] 02/01/2017 Yes    Abnormal liver enzymes [R74.8] 01/31/2017 Yes    Elevated bilirubin [R17] 01/31/2017 Yes    Biliary obstruction due to cancer [K83.1]  01/31/2017 Yes    Chemotherapy-induced thrombocytopenia [D69.59, T45.1X5A] 10/31/2016 Yes    Anxiety [F41.9] 08/09/2016 Yes    Metastasis to liver [C78.7] 06/23/2016 Yes     Chronic    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] 06/23/2016 Yes     Chronic    Fatty liver [K76.0] 05/03/2016 Yes     Chronic    History of colon cancer [Z85.038] 04/25/2016 Yes     Chronic    HTN (hypertension), benign [I10] 11/30/2012 Yes     Chronic    GERD (gastroesophageal reflux disease) [K21.9]  Yes     Chronic      Problems Resolved During this Admission:    Diagnosis Date Noted Date Resolved POA    Idiopathic hypotension [I95.0] 02/09/2017 02/11/2017 Yes    Sepsis associated hypotension [A41.9] 02/09/2017 02/10/2017 Yes    Acute cystitis without hematuria [N30.00] 02/09/2017 02/11/2017 Yes       VTE Risk Mitigation         Ordered     High Risk of VTE  Once      02/08/17 0035     Place sequential compression device  Until discontinued      02/08/17 0035     Place RENATA hose  Until discontinued      02/08/17 0035      Metastatic colon cancer   Cholestasis non responsive to stent   Poor prognosis D/W Dr. Calzada and family       Thank you for your consult.     Josh Reyna MD  Gastroenterology  [facility]

## 2017-02-13 NOTE — PROGRESS NOTES
Pt c/o anxiousness and not being able to sleep. Denies any pain at this time. Pt previously given prn ramelteon and xanax with no relief. T, Patrick, NP notified. New orders received for ambien 5 mg po times one dose. Will admin

## 2017-02-14 ENCOUNTER — TELEPHONE (OUTPATIENT)
Dept: HEMATOLOGY/ONCOLOGY | Facility: CLINIC | Age: 57
End: 2017-02-14

## 2017-02-14 LAB
ALBUMIN SERPL BCP-MCNC: 2.8 G/DL
ALP SERPL-CCNC: 265 U/L
ALT SERPL W/O P-5'-P-CCNC: 30 U/L
ANION GAP SERPL CALC-SCNC: 11 MMOL/L
AST SERPL-CCNC: 78 U/L
BACTERIA BLD CULT: NORMAL
BACTERIA BLD CULT: NORMAL
BASOPHILS # BLD AUTO: 0 K/UL
BASOPHILS NFR BLD: 0 %
BILIRUB DIRECT SERPL-MCNC: 4.7 MG/DL
BILIRUB SERPL-MCNC: 7.6 MG/DL
BUN SERPL-MCNC: 20 MG/DL
CALCIUM SERPL-MCNC: 8.1 MG/DL
CHLORIDE SERPL-SCNC: 103 MMOL/L
CO2 SERPL-SCNC: 14 MMOL/L
CREAT SERPL-MCNC: 0.6 MG/DL
DIFFERENTIAL METHOD: ABNORMAL
EOSINOPHIL # BLD AUTO: 0 K/UL
EOSINOPHIL NFR BLD: 0.1 %
ERYTHROCYTE [DISTWIDTH] IN BLOOD BY AUTOMATED COUNT: 16 %
EST. GFR  (AFRICAN AMERICAN): >60 ML/MIN/1.73 M^2
EST. GFR  (NON AFRICAN AMERICAN): >60 ML/MIN/1.73 M^2
GLUCOSE SERPL-MCNC: 107 MG/DL
HCT VFR BLD AUTO: 39.4 %
HGB BLD-MCNC: 13.1 G/DL
INR PPP: 1.6
LYMPHOCYTES # BLD AUTO: 1 K/UL
LYMPHOCYTES NFR BLD: 11 %
MCH RBC QN AUTO: 32 PG
MCHC RBC AUTO-ENTMCNC: 33.2 %
MCV RBC AUTO: 96 FL
MONOCYTES # BLD AUTO: 1 K/UL
MONOCYTES NFR BLD: 11.6 %
NEUTROPHILS # BLD AUTO: 6.6 K/UL
NEUTROPHILS NFR BLD: 77.3 %
PLATELET # BLD AUTO: 40 K/UL
PLATELET BLD QL SMEAR: ABNORMAL
PMV BLD AUTO: 10.9 FL
POTASSIUM SERPL-SCNC: 3.9 MMOL/L
PROT SERPL-MCNC: 4.9 G/DL
PROTHROMBIN TIME: 16.7 SEC
RBC # BLD AUTO: 4.1 M/UL
SODIUM SERPL-SCNC: 128 MMOL/L
WBC # BLD AUTO: 8.61 K/UL

## 2017-02-14 PROCEDURE — 85610 PROTHROMBIN TIME: CPT

## 2017-02-14 PROCEDURE — 11000001 HC ACUTE MED/SURG PRIVATE ROOM

## 2017-02-14 PROCEDURE — 63600175 PHARM REV CODE 636 W HCPCS: Performed by: HOSPITALIST

## 2017-02-14 PROCEDURE — 25000003 PHARM REV CODE 250: Performed by: NURSE PRACTITIONER

## 2017-02-14 PROCEDURE — 36415 COLL VENOUS BLD VENIPUNCTURE: CPT

## 2017-02-14 PROCEDURE — 25000003 PHARM REV CODE 250: Performed by: INTERNAL MEDICINE

## 2017-02-14 PROCEDURE — 63600175 PHARM REV CODE 636 W HCPCS: Performed by: NURSE PRACTITIONER

## 2017-02-14 PROCEDURE — 25000003 PHARM REV CODE 250: Performed by: HOSPITALIST

## 2017-02-14 PROCEDURE — 80076 HEPATIC FUNCTION PANEL: CPT

## 2017-02-14 PROCEDURE — P9045 ALBUMIN (HUMAN), 5%, 250 ML: HCPCS | Performed by: HOSPITALIST

## 2017-02-14 PROCEDURE — 85025 COMPLETE CBC W/AUTO DIFF WBC: CPT

## 2017-02-14 PROCEDURE — 94761 N-INVAS EAR/PLS OXIMETRY MLT: CPT

## 2017-02-14 PROCEDURE — 80048 BASIC METABOLIC PNL TOTAL CA: CPT

## 2017-02-14 PROCEDURE — 25000003 PHARM REV CODE 250: Performed by: PHYSICIAN ASSISTANT

## 2017-02-14 RX ORDER — HYDROMORPHONE HYDROCHLORIDE 2 MG/ML
1 INJECTION, SOLUTION INTRAMUSCULAR; INTRAVENOUS; SUBCUTANEOUS
Status: DISCONTINUED | OUTPATIENT
Start: 2017-02-14 | End: 2017-02-15 | Stop reason: HOSPADM

## 2017-02-14 RX ORDER — SODIUM CHLORIDE 9 MG/ML
INJECTION, SOLUTION INTRAVENOUS CONTINUOUS
Status: DISCONTINUED | OUTPATIENT
Start: 2017-02-14 | End: 2017-02-15

## 2017-02-14 RX ORDER — MORPHINE SULFATE 2 MG/ML
4 INJECTION, SOLUTION INTRAMUSCULAR; INTRAVENOUS EVERY 4 HOURS PRN
Status: DISCONTINUED | OUTPATIENT
Start: 2017-02-14 | End: 2017-02-15 | Stop reason: HOSPADM

## 2017-02-14 RX ADMIN — ALBUMIN (HUMAN) 25 G: 12.5 SOLUTION INTRAVENOUS at 12:02

## 2017-02-14 RX ADMIN — CEFTRIAXONE 1 G: 1 INJECTION, SOLUTION INTRAVENOUS at 01:02

## 2017-02-14 RX ADMIN — METRONIDAZOLE 500 MG: 500 SOLUTION INTRAVENOUS at 11:02

## 2017-02-14 RX ADMIN — SODIUM CHLORIDE: 0.9 INJECTION, SOLUTION INTRAVENOUS at 11:02

## 2017-02-14 RX ADMIN — PROMETHAZINE HYDROCHLORIDE 6.25 MG: 25 INJECTION INTRAMUSCULAR; INTRAVENOUS at 05:02

## 2017-02-14 RX ADMIN — DEXTROSE AND SODIUM CHLORIDE: 5; .45 INJECTION, SOLUTION INTRAVENOUS at 12:02

## 2017-02-14 RX ADMIN — AMITRIPTYLINE HYDROCHLORIDE 10 MG: 10 TABLET, FILM COATED ORAL at 08:02

## 2017-02-14 RX ADMIN — HYDROMORPHONE HYDROCHLORIDE 1 MG: 2 INJECTION, SOLUTION INTRAMUSCULAR; INTRAVENOUS; SUBCUTANEOUS at 10:02

## 2017-02-14 RX ADMIN — ALBUMIN (HUMAN) 25 G: 12.5 SOLUTION INTRAVENOUS at 11:02

## 2017-02-14 RX ADMIN — MORPHINE SULFATE 2 MG: 2 INJECTION, SOLUTION INTRAMUSCULAR; INTRAVENOUS at 08:02

## 2017-02-14 RX ADMIN — MORPHINE SULFATE 4 MG: 2 INJECTION, SOLUTION INTRAMUSCULAR; INTRAVENOUS at 11:02

## 2017-02-14 RX ADMIN — ALBUMIN (HUMAN) 25 G: 12.5 SOLUTION INTRAVENOUS at 05:02

## 2017-02-14 RX ADMIN — HYDROMORPHONE HYDROCHLORIDE 1 MG: 2 INJECTION, SOLUTION INTRAMUSCULAR; INTRAVENOUS; SUBCUTANEOUS at 01:02

## 2017-02-14 RX ADMIN — MORPHINE SULFATE 4 MG: 2 INJECTION, SOLUTION INTRAMUSCULAR; INTRAVENOUS at 05:02

## 2017-02-14 RX ADMIN — HYDROMORPHONE HYDROCHLORIDE 1 MG: 2 INJECTION, SOLUTION INTRAMUSCULAR; INTRAVENOUS; SUBCUTANEOUS at 05:02

## 2017-02-14 RX ADMIN — METRONIDAZOLE 500 MG: 500 SOLUTION INTRAVENOUS at 02:02

## 2017-02-14 RX ADMIN — SODIUM CHLORIDE 500 ML: 0.9 INJECTION, SOLUTION INTRAVENOUS at 01:02

## 2017-02-14 RX ADMIN — PANTOPRAZOLE SODIUM 20 MG: 20 TABLET, DELAYED RELEASE ORAL at 08:02

## 2017-02-14 RX ADMIN — STANDARDIZED SENNA CONCENTRATE AND DOCUSATE SODIUM 1 TABLET: 8.6; 5 TABLET, FILM COATED ORAL at 09:02

## 2017-02-14 RX ADMIN — SODIUM CHLORIDE, PRESERVATIVE FREE 1000 ML: 5 INJECTION INTRAVENOUS at 09:02

## 2017-02-14 NOTE — SUBJECTIVE & OBJECTIVE
Interval History:   Patient is having 10/10 pain in epigastric mid back region, as well as mild hypotension. Pain medication changed to hydromorphone 1 mg IV every 3 hours prn and 500 ml normal saline bolus give. IV fluids changed to normal saline for Sodium of 128. Abdomen is tight with hypoactive bowel sounds.     Review of Systems   Constitutional: Positive for fatigue. Negative for chills and fever.   Respiratory: Negative for cough and shortness of breath.    Cardiovascular: Negative for chest pain.   Gastrointestinal: Positive for abdominal distention, nausea and vomiting. Negative for abdominal pain.   Musculoskeletal: Positive for back pain.   Neurological: Positive for weakness.     Objective:     Vital Signs (Most Recent):  Temp: 97.9 °F (36.6 °C) (02/14/17 1200)  Pulse: 100 (02/14/17 1200)  Resp: 19 (02/14/17 1200)  BP: (!) 93/55 (02/14/17 0800)  SpO2: (!) 88 % (02/14/17 1248) Vital Signs (24h Range):  Temp:  [97.4 °F (36.3 °C)-97.9 °F (36.6 °C)] 97.9 °F (36.6 °C)  Pulse:  [] 100  Resp:  [18-19] 19  SpO2:  [88 %-95 %] 88 %  BP: ()/(54-70) 93/55     Weight: 89.8 kg (198 lb)  Body mass index is 27.62 kg/(m^2).    Intake/Output Summary (Last 24 hours) at 02/14/17 1449  Last data filed at 02/14/17 0600   Gross per 24 hour   Intake          2901.67 ml   Output              950 ml   Net          1951.67 ml      Physical Exam   Constitutional: He appears well-developed.   Ill-appearing    HENT:   Dry mucous membranes    Eyes: Scleral icterus is present.   Neck: No JVD present.   Cardiovascular: Normal rate and regular rhythm.    Pulmonary/Chest: Effort normal and breath sounds normal. No respiratory distress. He has no wheezes.   Port in right upper chest.    Abdominal: Soft. He exhibits distension. There is no tenderness.   Hypoactive bowel sounds   Musculoskeletal: He exhibits no edema.   Neurological: He is alert.   Skin: He is not diaphoretic.   Psychiatric:   Flat affect. Depressed.    Nursing  note and vitals reviewed.      Significant Labs:   CBC:   Recent Labs  Lab 02/13/17  0452 02/14/17  0640   WBC 7.54 8.61   HGB 11.2* 13.1*   HCT 34.1* 39.4*   PLT 46* 40*     CMP:   Recent Labs  Lab 02/13/17  0451 02/14/17  0640   * 128*   K 3.2* 3.9    103   CO2 17* 14*   GLU 78 107   BUN 31* 20   CREATININE 0.8 0.6   CALCIUM 6.7* 8.1*   PROT 4.9* 4.9*   ALBUMIN 3.0* 2.8*   BILITOT 5.0* 7.6*   ALKPHOS 276* 265*   AST 79* 78*   ALT 30 30   ANIONGAP 11 11   EGFRNONAA >60 >60     Coagulation:   Recent Labs  Lab 02/14/17  0640   INR 1.6*       Significant Imaging:  No new

## 2017-02-14 NOTE — PLAN OF CARE
Problem: Patient Care Overview  Goal: Plan of Care Review  Patient on RA, no respiratory distress noted. Will continue to monitor.   Outcome: Ongoing (interventions implemented as appropriate)  Pt's SpO2 90% on RA. No respiratory distress noted. Will continue to monitor SpO2.

## 2017-02-14 NOTE — ASSESSMENT & PLAN NOTE
"Portal vein thrombosis   H/O colon cancer  Hyponatremia   Ascites   Pt's abdomen appears distended. Abdominal ultrasound ordered to assess and shows same amount "mild to moderate" of ascites as seen on MRI 1/31/2017. Stopped IVF and started on furosemide which may renal function worse.  Stopped furosemide.  Suspect worsening liver problems.  Oncologist is Dr. Calzada. Outpatient work-up planned.  Sent message to Dr. Calzada on 2/13 with no response.  Continue pain management. Was on coumadin for portal vein thrombosis.  Discontinued for persistent thrombocytopenia and supratherapeutic INR. Gave 1 dose of vitamin K on 2/12.   Continue daily labs. Started on lactulose for hepatic encephalopathy - ammonia is 49 and family states he is confused and forgetful.     "

## 2017-02-14 NOTE — PROGRESS NOTES
[facility]  Progress Note    Patient Name: Berlin Villa  MRN: [id number]  Patient Class: IP- Inpatient   Admission Date: 2/8/2017  Length of Stay: 5 days  Attending Physician: Dale Valera MD  Primary Care Provider: Luis M Ruelas MD        Subjective:     Principal Problem:Acute cholangitis    HPI:  Berlin Villa is a 56 y.o.  male with with hypertension, anxiety, gastroesophageal reflux disease, metastatic colon cancer (originally diagnosed in 2010) to the liver (May 2016) that is currently undergoing chemotherapy with hematologist-oncologist Dr. Karoline Calzada. He lives in [place], [place]. His primary care physician is Dr. Luis M Ruelas. He was recently hospitalized at [facility] 1/31/17-2/4/17 for biliary obstruction due to his cancer. He had ERCP with biliary stent placement performed by gastroenterologist Dr. Josh Reyna on 2/03/17 and was discharged later that evening to follow up in clinic in 2-4 weeks.     He was seen by Dr. Calzada in clinic on 2/07/17. He complained of increased weakness and fatigue, accompanied by nausea, vomiting, poor appetite, and back pain (chronic). He was noted to have hypotension and tachycardia during the office visit.  He was sent to [facility] ED for evaluation. He had systolic blood pressure in 80s-90s, HR 110s. Tbili was 8.2 (6.7 on 2/4/17),  (263), AST 93 (57), ALT 33 (17), WBC 11.2 (8.95). The ED physician spoke with Dr. Reyna, who recommended IV fluids and IV antibiotics and to discharge him home from the ED to follow up in clinic the following day with him. He was given 2 liters normal saline bolus, metoclopramide 10 mg IV, and piperacillin-tazobactam 4.5 grams. Due to weakness and fatigue, the ED physician did not feel patient safe discharging him home. He was accepted for transfer and admission to [facility]. He was transferred to [facility].       Hospital Course:  Transferred from  "[facility] for evaluation by GI for biliary obstruction.  Seen by GI on 2/8 who suggests: "56 year old male s/p stent for biliary stricture here with malaise and persistent hyperbilirubinemia, although down from yesterday. Suspect mild cholangitis but no evidence for stent occlusion. Continue antibiotics and trend liver tests but no role for ERCP at this time.  On 2/9, Noted hypotension and tachycardia overnight.  WBC increased to 21K today with BILLIE.  Work-up for sepsis.  Urinalysis suggests UTI.  Given 1L fluid bolus.  Lactic acid 2.0.  Today, 2/10, WBC back to normal and patient looks and feels better.  Urine gram stain shows No WBC's, epithelial cells or organisms. T Bili continues to improve.  Antibiotics adjusted by Pharmacist for more targeted coverage. 2/11 Urine culture shows no growth.  Abdomen more distended with increasing INR despite receiving no coumadin. Liver is not functioning properly likely 2/2 cancer.  Ordered abdominal ultrasound to assess for ascites for paracentesis which shows "mild to moderate amount of ascites, similar to MRI from 1/31/2017".    2/13 Pt continues to decline: poor PO intake, now vomiting/gagging after eating or taking meds. Inserted indwelling urinary catheter for urinary retention and urine output is fair.  Sent message to Oncologist Dr. Calzada to inquire about whether patient is candidate for Hospice.  Received text message from GI today noting that he will do EGD tomorrow as total bilirubin has trended up again.  Spoke to son and sister on 2/13 about plan for home with Home Health or Hospice when cleared by GI.  Mary Villatoro, Transition Navigator, will assist with discharge planning.        Interval History: Pt continues to vomit food and medication.  Low urine output - restarted IVF today. Pt states he is tired and wants to sleep in his own bed.  Sister notes intermittent confusion likely 2/2 elevated ammonia level.  Sent message to Dr. Calzada, Oncologist, but received " no response yet regarding enrolling this patient in hospice.  He denies CP, SOB. Denies abdominal pain.     Review of Systems   Constitutional: Positive for fatigue. Negative for chills and fever.   Respiratory: Negative for cough and shortness of breath.    Cardiovascular: Negative for chest pain.   Gastrointestinal: Positive for abdominal distention, nausea and vomiting. Negative for abdominal pain.   Musculoskeletal: Positive for back pain.   Neurological: Positive for weakness.     Objective:     Vital Signs (Most Recent):  Temp: 97.6 °F (36.4 °C) (02/13/17 1600)  Pulse: 101 (02/13/17 1600)  Resp: 18 (02/13/17 1600)  BP: 113/67 (02/13/17 1600)  SpO2: (!) 92 % (02/13/17 1205) Vital Signs (24h Range):  Temp:  [97 °F (36.1 °C)-97.7 °F (36.5 °C)] 97.6 °F (36.4 °C)  Pulse:  [] 101  Resp:  [18-21] 18  SpO2:  [92 %-96 %] 92 %  BP: ()/(50-67) 113/67     Weight: 89.8 kg (198 lb)  Body mass index is 27.62 kg/(m^2).    Intake/Output Summary (Last 24 hours) at 02/13/17 1929  Last data filed at 02/13/17 1708   Gross per 24 hour   Intake             1220 ml   Output              750 ml   Net              470 ml      Physical Exam   Constitutional: He appears well-developed.   Ill-appearing    HENT:   Dry mucous membranes    Eyes: Scleral icterus is present.   Neck: No JVD present.   Cardiovascular: Normal rate and regular rhythm.    Pulmonary/Chest: Effort normal and breath sounds normal. No respiratory distress. He has no wheezes.   Port in right upper chest.    Abdominal: Soft. Bowel sounds are normal. He exhibits distension. There is no tenderness.   Musculoskeletal: He exhibits no edema.   Neurological: He is alert.   Skin: He is not diaphoretic.   Psychiatric:   Flat affect. Depressed.    Nursing note and vitals reviewed.      Significant Labs:   CBC:   Recent Labs  Lab 02/12/17  0946 02/13/17  0452   WBC 11.07 7.54   HGB 15.6 11.2*   HCT 45.2 34.1*   PLT 55* 46*     CMP:   Recent Labs  Lab 02/12/17  0941  "02/13/17  0451   * 130*   K 5.2* 3.2*    102   CO2 14* 17*   * 78   BUN 35* 31*   CREATININE 0.9 0.8   CALCIUM 8.1* 6.7*   PROT 5.2* 4.9*   ALBUMIN 1.7* 3.0*   BILITOT 5.0* 5.0*   ALKPHOS 401* 276*   * 79*   ALT 43 30   ANIONGAP 12 11   EGFRNONAA >60 >60     Coagulation:   Recent Labs  Lab 02/13/17  0451   INR 2.1*       Significant Imaging: no new    Assessment/Plan:      * Acute cholangitis  Biliary obstruction due to cancer, biliary stent placement 2/3/17  Abnormal liver enzymes  Elevated bilirubin  Tbili 5.0 today, same as yesterday but still down from 8.2 on admission.  Alk Phos and AST are better today but still abnormal. Seen by GI who has no intervention planned: "Suspect mild cholangitis but no evidence for stent occlusion. Continue antibiotics and trend liver tests but no role for ERCP at this time." Received text message from Dr. Reyna today, 2/13, who states will do ERCP tomorrow.       Restarted IVFs.  Received zosyn x 3 days. Started on ceftriaxone and metronidazole, day #4.  Avoid hepatotoxins.  Daily BMP and LFTs.       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 Abdominal ultrasound ordered to assess and shows same amount "mild to moderate" of ascites as seen on MRI 1/31/2017. Stopped IVF and started on furosemide which may renal function worse.  Stopped furosemide.  Suspect worsening liver problems.  Oncologist is Dr. Calzada. Outpatient work-up planned.  Sent message to Dr. Calzada on 2/13 with no response.  Continue pain management. Was on coumadin for portal vein thrombosis.  Discontinued for persistent thrombocytopenia and supratherapeutic INR. Gave 1 dose of vitamin K on 2/12.   Continue daily labs. Started on lactulose for hepatic encephalopathy - ammonia is 49 and family states he is confused and forgetful.       HTN (hypertension), benign  Holding " lisinopril due to hypotension. Will discontinue at discharge.  Continue to monitor. SBP .        Urinary retention with incomplete bladder emptying  Pt c/o urinary retention on admission. States that he has had difficulty urinating for weeks.  No evidence of UTI.  No hydronephrosis seen on imaging and renal function is normal except for elevated BUN likely 2/2 dehydration.  Ordered indwelling catheter on 2/12/2017 and patient may see Urology as an outpatient unless he goes home with Hospice.       Chemotherapy-induced thrombocytopenia  Platelet count 46 today. No visible, active bleeding. Discontinued home coumadin. Continue to monitor.      Anxiety  Pt appears depressed.  On PRN xanax for anxiety at home.       VTE Risk Mitigation         Ordered     High Risk of VTE  Once      02/08/17 0035     Place sequential compression device  Until discontinued      02/08/17 0035     Place RENATA hose  Until discontinued      02/08/17 0035          Klyee Carbajal PA-C  Department of Hospital Medicine   [facility]  Pager: [phone]    Attending: Dale Valera MD

## 2017-02-14 NOTE — PROGRESS NOTES
[facility]  Progress Note    Patient Name: Berlin Villa  MRN: [id number]  Patient Class: IP- Inpatient   Admission Date: 2/8/2017  Length of Stay: 6 days  Attending Physician: Noam Hansen MD  Primary Care Provider: Luis M Ruelas MD        Subjective:     Principal Problem:Acute cholangitis    HPI:  Berlin Villa is a 56 y.o.  male with with hypertension, anxiety, gastroesophageal reflux disease, metastatic colon cancer (originally diagnosed in 2010) to the liver (May 2016) that is currently undergoing chemotherapy with hematologist-oncologist Dr. Karoline Calzada. He lives in [place], [place]. His primary care physician is Dr. Luis M Ruelas. He was recently hospitalized at [facility] 1/31/17-2/4/17 for biliary obstruction due to his cancer. He had ERCP with biliary stent placement performed by gastroenterologist Dr. Josh Reyna on 2/03/17 and was discharged later that evening to follow up in clinic in 2-4 weeks.     He was seen by Dr. Calzada in clinic on 2/07/17. He complained of increased weakness and fatigue, accompanied by nausea, vomiting, poor appetite, and back pain (chronic). He was noted to have hypotension and tachycardia during the office visit.  He was sent to [facility] ED for evaluation. He had systolic blood pressure in 80s-90s, HR 110s. Tbili was 8.2 (6.7 on 2/4/17),  (263), AST 93 (57), ALT 33 (17), WBC 11.2 (8.95). The ED physician spoke with Dr. Reyna, who recommended IV fluids and IV antibiotics and to discharge him home from the ED to follow up in clinic the following day with him. He was given 2 liters normal saline bolus, metoclopramide 10 mg IV, and piperacillin-tazobactam 4.5 grams. Due to weakness and fatigue, the ED physician did not feel patient safe discharging him home. He was accepted for transfer and admission to [facility]. He was transferred to [facility].       Hospital Course:  Transferred from  "[facility] for evaluation by GI for biliary obstruction.  Seen by GI on 2/8 who suggests: "56 year old male s/p stent for biliary stricture here with malaise and persistent hyperbilirubinemia, although down from yesterday. Suspect mild cholangitis but no evidence for stent occlusion. Continue antibiotics and trend liver tests but no role for ERCP at this time.  On 2/9, Noted hypotension and tachycardia overnight.  WBC increased to 21K today with BILLIE.  Work-up for sepsis.  Urinalysis suggests UTI.  Given 1L fluid bolus.  Lactic acid 2.0.  Today, 2/10, WBC back to normal and patient looks and feels better.  Urine gram stain shows No WBC's, epithelial cells or organisms. T Bili continues to improve.  Antibiotics adjusted by Pharmacist for more targeted coverage. 2/11 Urine culture shows no growth.  Abdomen more distended with increasing INR despite receiving no coumadin. Liver is not functioning properly likely 2/2 cancer.  Ordered abdominal ultrasound to assess for ascites for paracentesis which shows "mild to moderate amount of ascites, similar to MRI from 1/31/2017".    2/13 Pt continues to decline: poor PO intake, now vomiting/gagging after eating or taking meds. Inserted indwelling urinary catheter for urinary retention and urine output is fair.  Sent message to Oncologist Dr. Calzada to inquire about whether patient is candidate for Hospice.  Received text message from GI today noting that he will do EGD tomorrow as total bilirubin has trended up again.  Spoke to son and sister on 2/13 about plan for home with Home Health or Hospice when cleared by GI.  Mary Villatoro, Transition Navigator, will assist with discharge planning.        Interval History:   Patient is having 10/10 pain in epigastric mid back region, as well as mild hypotension. Pain medication changed to hydromorphone 1 mg IV every 3 hours prn and 500 ml normal saline bolus give. IV fluids changed to normal saline for Sodium of 128. Abdomen is tight " with hypoactive bowel sounds.     Review of Systems   Constitutional: Positive for fatigue. Negative for chills and fever.   Respiratory: Negative for cough and shortness of breath.    Cardiovascular: Negative for chest pain.   Gastrointestinal: Positive for abdominal distention, nausea and vomiting. Negative for abdominal pain.   Musculoskeletal: Positive for back pain.   Neurological: Positive for weakness.     Objective:     Vital Signs (Most Recent):  Temp: 97.9 °F (36.6 °C) (02/14/17 1200)  Pulse: 100 (02/14/17 1200)  Resp: 19 (02/14/17 1200)  BP: (!) 93/55 (02/14/17 0800)  SpO2: (!) 88 % (02/14/17 1248) Vital Signs (24h Range):  Temp:  [97.4 °F (36.3 °C)-97.9 °F (36.6 °C)] 97.9 °F (36.6 °C)  Pulse:  [] 100  Resp:  [18-19] 19  SpO2:  [88 %-95 %] 88 %  BP: ()/(54-70) 93/55     Weight: 89.8 kg (198 lb)  Body mass index is 27.62 kg/(m^2).    Intake/Output Summary (Last 24 hours) at 02/14/17 1449  Last data filed at 02/14/17 0600   Gross per 24 hour   Intake          2901.67 ml   Output              950 ml   Net          1951.67 ml      Physical Exam   Constitutional: He appears well-developed.   Ill-appearing    HENT:   Dry mucous membranes    Eyes: Scleral icterus is present.   Neck: No JVD present.   Cardiovascular: Normal rate and regular rhythm.    Pulmonary/Chest: Effort normal and breath sounds normal. No respiratory distress. He has no wheezes.   Port in right upper chest.    Abdominal: Soft. He exhibits distension. There is no tenderness.   Hypoactive bowel sounds   Musculoskeletal: He exhibits no edema.   Neurological: He is alert.   Skin: He is not diaphoretic.   Psychiatric:   Flat affect. Depressed.    Nursing note and vitals reviewed.      Significant Labs:   CBC:   Recent Labs  Lab 02/13/17  0452 02/14/17  0640   WBC 7.54 8.61   HGB 11.2* 13.1*   HCT 34.1* 39.4*   PLT 46* 40*     CMP:   Recent Labs  Lab 02/13/17  0451 02/14/17  0640   * 128*   K 3.2* 3.9    103   CO2 17* 14*  "  GLU 78 107   BUN 31* 20   CREATININE 0.8 0.6   CALCIUM 6.7* 8.1*   PROT 4.9* 4.9*   ALBUMIN 3.0* 2.8*   BILITOT 5.0* 7.6*   ALKPHOS 276* 265*   AST 79* 78*   ALT 30 30   ANIONGAP 11 11   EGFRNONAA >60 >60     Coagulation:   Recent Labs  Lab 02/14/17  0640   INR 1.6*       Significant Imaging:  No new    Assessment/Plan:      * Acute cholangitis  Biliary obstruction due to cancer, biliary stent placement 2/3/17  Abnormal liver enzymes  Elevated bilirubin  Tbili 5.0 today, same as yesterday but still down from 8.2 on admission.  Alk Phos and AST are better today but still abnormal. Seen by GI who has no intervention planned: "Suspect mild cholangitis but no evidence for stent occlusion. Continue antibiotics and trend liver tests but no role for ERCP at this time." Received text message from Dr. Reyna today, 2/13, who states will do ERCP tomorrow.       Restarted IVFs.  Received zosyn x 3 days. Started on ceftriaxone and metronidazole, day #5.  Avoid hepatotoxins.  Daily BMP and LFTs.       GERD (gastroesophageal reflux disease)  PPI      HTN (hypertension), benign  Holding lisinopril due to hypotension  Continue to monitor. SBP .        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  Continue to f/u with heme/onc.      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 Abdominal ultrasound ordered to assess and shows same amount "mild to moderate" of ascites as seen on MRI 1/31/2017. Stopped IVF and started on furosemide which may renal function worse.  Stopped furosemide.  Suspect worsening liver problems.  Oncologist is Dr. Calzada. Outpatient work-up planned.  Sent message to Dr. Calzada on 2/13 with no response.  Continue pain management. Was on coumadin for portal vein thrombosis.  Discontinued for " "persistent thrombocytopenia and supratherapeutic INR. Gave 1 dose of vitamin K on 2/12.   Continue daily labs. Started on lactulose for hepatic encephalopathy - ammonia is 49 and family states he is confused and forgetful.       Anxiety  Pt appears depressed.  On PRN xanax for anxiety at home.       Biliary obstruction due to cancer  Abnormal liver enzymes  Elevated bilirubin  Hyponatremia   Tbili 5.6 today, down from 8.2 on admission.  Decreased in LFTs this morning.  Seen by GI who has no intervention planned: "Suspect mild cholangitis but no evidence for stent occlusion. Continue antibiotics and trend liver tests but no role for ERCP at this time."     Continue IVFs; continue zosyn, day #2.  Avoid hepatotoxins.  Daily BMP and LFTs.     Hyponatremia  Serum sodium 128, down from 130 on 2/13/17; likely related to decreased intake/dehydration.  Given 2L NS in ED; continue IVFs.  Monitor on telemetry.  Daily BMP.      Urinary retention with incomplete bladder emptying  Pt c/o urinary retention on admission. States that he has had difficulty urinating for weeks.  No evidence of UTI.  No hydronephrosis seen on imaging and renal function is normal except for elevated BUN likely 2/2 dehydration.  Ordered indwelling catheter on 2/12/2017 and patient may see Urology as an outpatient unless he goes home with Hospice.       VTE Risk Mitigation         Ordered     High Risk of VTE  Once      02/08/17 0035     Place sequential compression device  Until discontinued      02/08/17 0035     Place RENATA hose  Until discontinued      02/08/17 0035          Zara Garcia NP  Department of Hospital Medicine   [facility]  "

## 2017-02-14 NOTE — PLAN OF CARE
Problem: Patient Care Overview  Goal: Plan of Care Review  Outcome: Ongoing (interventions implemented as appropriate)  Plan of care reviewed with patient. Patient verbalized understanding. Patient's sister has remained at bedside throughout shift. IV fluids maintained per MD orders. Patient NPO since midnight in preparation for EGD today. Patient NSR-sinus tachycardia on telemetry monitoring, HR 90's-100's. Bed is low and locked, call light is within reach. Patient has been instructed to call if in need of assistance. Patient verbalized understanding.

## 2017-02-14 NOTE — ASSESSMENT & PLAN NOTE
Serum sodium 128, down from 130 on 2/13/17; likely related to decreased intake/dehydration.  Given 2L NS in ED; continue IVFs.  Monitor on telemetry.  Daily BMP.

## 2017-02-14 NOTE — PLAN OF CARE
Problem: Patient Care Overview  Goal: Plan of Care Review  Patient on RA, no respiratory distress noted. Will continue to monitor.   Outcome: Ongoing (interventions implemented as appropriate)  Reviewed plan of care with pt and family. Pt reported pain 10/10 throughout the day. He reports feeling bloated and abdominal discomfort. Family consulted with the  today. Safety measures are in place, bed low and in lock position, call light in reach, bed alarm is on, and family remains at the bedside. Pt verbalizes full understanding of their plan of care.

## 2017-02-14 NOTE — ASSESSMENT & PLAN NOTE
Pt c/o urinary retention on admission. States that he has had difficulty urinating for weeks.  No evidence of UTI.  No hydronephrosis seen on imaging and renal function is normal except for elevated BUN likely 2/2 dehydration.  Ordered indwelling catheter on 2/12/2017 and patient may see Urology as an outpatient unless he goes home with Hospice.

## 2017-02-14 NOTE — ASSESSMENT & PLAN NOTE
Platelet count 46 today. No visible, active bleeding. Discontinued home coumadin. Continue to monitor.

## 2017-02-14 NOTE — SUBJECTIVE & OBJECTIVE
Interval History: Pt continues to vomit food and medication.  Low urine output - restarted IVF today. Pt states he is tired and wants to sleep in his own bed.  Sister notes intermittent confusion likely 2/2 elevated ammonia level.  Sent message to Dr. Calzada, Oncologist, but received no response yet regarding enrolling this patient in hospice.  He denies CP, SOB. Denies abdominal pain.     Review of Systems   Constitutional: Positive for fatigue. Negative for chills and fever.   Respiratory: Negative for cough and shortness of breath.    Cardiovascular: Negative for chest pain.   Gastrointestinal: Positive for abdominal distention, nausea and vomiting. Negative for abdominal pain.   Musculoskeletal: Positive for back pain.   Neurological: Positive for weakness.     Objective:     Vital Signs (Most Recent):  Temp: 97.6 °F (36.4 °C) (02/13/17 1600)  Pulse: 101 (02/13/17 1600)  Resp: 18 (02/13/17 1600)  BP: 113/67 (02/13/17 1600)  SpO2: (!) 92 % (02/13/17 1205) Vital Signs (24h Range):  Temp:  [97 °F (36.1 °C)-97.7 °F (36.5 °C)] 97.6 °F (36.4 °C)  Pulse:  [] 101  Resp:  [18-21] 18  SpO2:  [92 %-96 %] 92 %  BP: ()/(50-67) 113/67     Weight: 89.8 kg (198 lb)  Body mass index is 27.62 kg/(m^2).    Intake/Output Summary (Last 24 hours) at 02/13/17 1929  Last data filed at 02/13/17 1708   Gross per 24 hour   Intake             1220 ml   Output              750 ml   Net              470 ml      Physical Exam   Constitutional: He appears well-developed.   Ill-appearing    HENT:   Dry mucous membranes    Eyes: Scleral icterus is present.   Neck: No JVD present.   Cardiovascular: Normal rate and regular rhythm.    Pulmonary/Chest: Effort normal and breath sounds normal. No respiratory distress. He has no wheezes.   Port in right upper chest.    Abdominal: Soft. Bowel sounds are normal. He exhibits distension. There is no tenderness.   Musculoskeletal: He exhibits no edema.   Neurological: He is alert.   Skin: He is  not diaphoretic.   Psychiatric:   Flat affect. Depressed.    Nursing note and vitals reviewed.      Significant Labs:   CBC:   Recent Labs  Lab 02/12/17  0946 02/13/17  0452   WBC 11.07 7.54   HGB 15.6 11.2*   HCT 45.2 34.1*   PLT 55* 46*     CMP:   Recent Labs  Lab 02/12/17  0946 02/13/17 0451   * 130*   K 5.2* 3.2*    102   CO2 14* 17*   * 78   BUN 35* 31*   CREATININE 0.9 0.8   CALCIUM 8.1* 6.7*   PROT 5.2* 4.9*   ALBUMIN 1.7* 3.0*   BILITOT 5.0* 5.0*   ALKPHOS 401* 276*   * 79*   ALT 43 30   ANIONGAP 12 11   EGFRNONAA >60 >60     Coagulation:   Recent Labs  Lab 02/13/17 0451   INR 2.1*       Significant Imaging: no new

## 2017-02-14 NOTE — ASSESSMENT & PLAN NOTE
"Biliary obstruction due to cancer, biliary stent placement 2/3/17  Abnormal liver enzymes  Elevated bilirubin  Tbili 5.0 today, same as yesterday but still down from 8.2 on admission.  Alk Phos and AST are better today but still abnormal. Seen by GI who has no intervention planned: "Suspect mild cholangitis but no evidence for stent occlusion. Continue antibiotics and trend liver tests but no role for ERCP at this time." Received text message from Dr. Reyna today, 2/13, who states will do ERCP tomorrow.       Restarted IVFs.  Received zosyn x 3 days. Started on ceftriaxone and metronidazole, day #4.  Avoid hepatotoxins.  Daily BMP and LFTs.     "

## 2017-02-14 NOTE — ASSESSMENT & PLAN NOTE
"Biliary obstruction due to cancer, biliary stent placement 2/3/17  Abnormal liver enzymes  Elevated bilirubin  Tbili 5.0 today, same as yesterday but still down from 8.2 on admission.  Alk Phos and AST are better today but still abnormal. Seen by GI who has no intervention planned: "Suspect mild cholangitis but no evidence for stent occlusion. Continue antibiotics and trend liver tests but no role for ERCP at this time." Received text message from Dr. Reyna today, 2/13, who states will do ERCP tomorrow.       Restarted IVFs.  Received zosyn x 3 days. Started on ceftriaxone and metronidazole, day #5.  Avoid hepatotoxins.  Daily BMP and LFTs.     "

## 2017-02-15 VITALS
BODY MASS INDEX: 27.72 KG/M2 | HEIGHT: 71 IN | SYSTOLIC BLOOD PRESSURE: 116 MMHG | TEMPERATURE: 98 F | DIASTOLIC BLOOD PRESSURE: 70 MMHG | OXYGEN SATURATION: 92 % | WEIGHT: 198 LBS | RESPIRATION RATE: 20 BRPM | HEART RATE: 105 BPM

## 2017-02-15 LAB
ALBUMIN SERPL BCP-MCNC: 3 G/DL
ALP SERPL-CCNC: 267 U/L
ALT SERPL W/O P-5'-P-CCNC: 28 U/L
ANION GAP SERPL CALC-SCNC: 12 MMOL/L
AST SERPL-CCNC: 77 U/L
BILIRUB SERPL-MCNC: 10 MG/DL
BUN SERPL-MCNC: 16 MG/DL
CALCIUM SERPL-MCNC: 8.3 MG/DL
CHLORIDE SERPL-SCNC: 106 MMOL/L
CO2 SERPL-SCNC: 13 MMOL/L
CREAT SERPL-MCNC: 0.7 MG/DL
EST. GFR  (AFRICAN AMERICAN): >60 ML/MIN/1.73 M^2
EST. GFR  (NON AFRICAN AMERICAN): >60 ML/MIN/1.73 M^2
GLUCOSE SERPL-MCNC: 89 MG/DL
MAGNESIUM SERPL-MCNC: 2.1 MG/DL
POTASSIUM SERPL-SCNC: 4.1 MMOL/L
PROT SERPL-MCNC: 4.8 G/DL
SODIUM SERPL-SCNC: 131 MMOL/L

## 2017-02-15 PROCEDURE — 63600175 PHARM REV CODE 636 W HCPCS: Performed by: NURSE PRACTITIONER

## 2017-02-15 PROCEDURE — 25000003 PHARM REV CODE 250: Performed by: NURSE PRACTITIONER

## 2017-02-15 PROCEDURE — 80053 COMPREHEN METABOLIC PANEL: CPT

## 2017-02-15 PROCEDURE — 63600175 PHARM REV CODE 636 W HCPCS: Performed by: HOSPITALIST

## 2017-02-15 PROCEDURE — 99232 SBSQ HOSP IP/OBS MODERATE 35: CPT | Mod: ,,, | Performed by: INTERNAL MEDICINE

## 2017-02-15 PROCEDURE — 36415 COLL VENOUS BLD VENIPUNCTURE: CPT

## 2017-02-15 PROCEDURE — P9045 ALBUMIN (HUMAN), 5%, 250 ML: HCPCS | Performed by: HOSPITALIST

## 2017-02-15 PROCEDURE — 94761 N-INVAS EAR/PLS OXIMETRY MLT: CPT

## 2017-02-15 PROCEDURE — 83735 ASSAY OF MAGNESIUM: CPT

## 2017-02-15 PROCEDURE — 25000003 PHARM REV CODE 250: Performed by: PHYSICIAN ASSISTANT

## 2017-02-15 RX ORDER — FENTANYL 50 UG/1
1 PATCH TRANSDERMAL
Status: DISCONTINUED | OUTPATIENT
Start: 2017-02-15 | End: 2017-02-15 | Stop reason: HOSPADM

## 2017-02-15 RX ORDER — MORPHINE SULFATE ORAL SOLUTION 10 MG/5ML
15 SOLUTION ORAL
Refills: 0
Start: 2017-02-15

## 2017-02-15 RX ORDER — FENTANYL 50 UG/1
1 PATCH TRANSDERMAL
Qty: 10 PATCH | Refills: 0
Start: 2017-02-15

## 2017-02-15 RX ORDER — FUROSEMIDE 10 MG/ML
40 INJECTION INTRAMUSCULAR; INTRAVENOUS ONCE
Status: COMPLETED | OUTPATIENT
Start: 2017-02-15 | End: 2017-02-15

## 2017-02-15 RX ADMIN — ALBUMIN (HUMAN) 25 G: 12.5 SOLUTION INTRAVENOUS at 12:02

## 2017-02-15 RX ADMIN — AMITRIPTYLINE HYDROCHLORIDE 10 MG: 10 TABLET, FILM COATED ORAL at 09:02

## 2017-02-15 RX ADMIN — PANTOPRAZOLE SODIUM 20 MG: 20 TABLET, DELAYED RELEASE ORAL at 09:02

## 2017-02-15 RX ADMIN — HYDROMORPHONE HYDROCHLORIDE 1 MG: 2 INJECTION, SOLUTION INTRAMUSCULAR; INTRAVENOUS; SUBCUTANEOUS at 03:02

## 2017-02-15 RX ADMIN — PROMETHAZINE HYDROCHLORIDE 6.25 MG: 25 INJECTION INTRAMUSCULAR; INTRAVENOUS at 10:02

## 2017-02-15 RX ADMIN — CEFTRIAXONE 1 G: 1 INJECTION, SOLUTION INTRAVENOUS at 12:02

## 2017-02-15 RX ADMIN — HYDROMORPHONE HYDROCHLORIDE 1 MG: 2 INJECTION, SOLUTION INTRAMUSCULAR; INTRAVENOUS; SUBCUTANEOUS at 10:02

## 2017-02-15 RX ADMIN — FENTANYL 1 PATCH: 50 PATCH, EXTENDED RELEASE TRANSDERMAL at 09:02

## 2017-02-15 RX ADMIN — STANDARDIZED SENNA CONCENTRATE AND DOCUSATE SODIUM 1 TABLET: 8.6; 5 TABLET, FILM COATED ORAL at 09:02

## 2017-02-15 RX ADMIN — PROMETHAZINE HYDROCHLORIDE 6.25 MG: 25 INJECTION INTRAMUSCULAR; INTRAVENOUS at 03:02

## 2017-02-15 RX ADMIN — FUROSEMIDE 40 MG: 10 INJECTION, SOLUTION INTRAMUSCULAR; INTRAVENOUS at 09:02

## 2017-02-15 RX ADMIN — ALBUMIN (HUMAN) 25 G: 12.5 SOLUTION INTRAVENOUS at 05:02

## 2017-02-15 NOTE — ASSESSMENT & PLAN NOTE
Pt c/o urinary retention on admission. States that he has had difficulty urinating for weeks.  No evidence of UTI.  No hydronephrosis seen on imaging and renal function is normal except for elevated BUN likely 2/2 dehydration.  Indwelling catheter on 2/12/2017 home with Hospice.

## 2017-02-15 NOTE — ASSESSMENT & PLAN NOTE
"Abnormal liver enzymes  Elevated bilirubin  Hyponatremia   Tbili 5.6 today, down from 8.2 on admission.  Decreased in LFTs this morning.  Seen by GI who has no intervention planned: "Suspect mild cholangitis but no evidence for stent occlusion. Continue antibiotics and trend liver tests but no role for ERCP at this time."     D/C antibiotics. Comfort care with [facility]  "

## 2017-02-15 NOTE — DISCHARGE SUMMARY
[facility]  Discharge Summary      Patient Name: Berlin Villa  MRN: [id number]  Admission Date: 2/8/2017  Hospital Length of Stay: 7 days  Discharge Date and Time:  02/15/2017 1:42 PM  Attending Physician: Noam Hansen MD   Discharging Provider: Zara Garcia NP  Primary Care Provider: Luis M Ruelas MD      HPI:   Berlin Villa is a 56 y.o.  male with with hypertension, anxiety, gastroesophageal reflux disease, metastatic colon cancer (originally diagnosed in 2010) to the liver (May 2016) that is currently undergoing chemotherapy with hematologist-oncologist Dr. Karoline Calzada. He lives in [place], [place]. His primary care physician is Dr. Luis M Ruelas. He was recently hospitalized at [facility] 1/31/17-2/4/17 for biliary obstruction due to his cancer. He had ERCP with biliary stent placement performed by gastroenterologist Dr. Josh Reyna on 2/03/17 and was discharged later that evening to follow up in clinic in 2-4 weeks.     He was seen by Dr. Calzada in clinic on 2/07/17. He complained of increased weakness and fatigue, accompanied by nausea, vomiting, poor appetite, and back pain (chronic). He was noted to have hypotension and tachycardia during the office visit.  He was sent to [facility] ED for evaluation. He had systolic blood pressure in 80s-90s, HR 110s. Tbili was 8.2 (6.7 on 2/4/17),  (263), AST 93 (57), ALT 33 (17), WBC 11.2 (8.95). The ED physician spoke with Dr. Reyna, who recommended IV fluids and IV antibiotics and to discharge him home from the ED to follow up in clinic the following day with him. He was given 2 liters normal saline bolus, metoclopramide 10 mg IV, and piperacillin-tazobactam 4.5 grams. Due to weakness and fatigue, the ED physician did not feel patient safe discharging him home. He was accepted for transfer and admission to [facility]. He was transferred to [facility].       * No surgery  "found *      Indwelling Lines/Drains at time of discharge:   Lines/Drains/Airways     Central Venous Catheter Line                 Port A Cath Single Lumen right subclavian -- days         Port A Cath Single Lumen 06/27/16 1312 right subclavian 233 days          Drain                 Urethral Catheter 02/13/17 1600 1 day              Hospital Course:   Transferred from [facility] for evaluation by GI for biliary obstruction.  Seen by GI on 2/8 who suggests: "56 year old male s/p stent for biliary stricture here with malaise and persistent hyperbilirubinemia, although down from yesterday. Suspect mild cholangitis but no evidence for stent occlusion. Continue antibiotics and trend liver tests but no role for ERCP at this time.  On 2/9, Noted hypotension and tachycardia overnight.  WBC increased to 21K today with BILLIE.  Work-up for sepsis.  Urinalysis suggests UTI.  Given 1L fluid bolus.  Lactic acid 2.0.  Today, 2/10, WBC back to normal and patient looks and feels better.  Urine gram stain shows No WBC's, epithelial cells or organisms. T Bili continues to improve.  Antibiotics adjusted by Pharmacist for more targeted coverage. 2/11 Urine culture shows no growth.  Abdomen more distended with increasing INR despite receiving no coumadin. Liver is not functioning properly likely 2/2 cancer.  Ordered abdominal ultrasound to assess for ascites for paracentesis which shows "mild to moderate amount of ascites, similar to MRI from 1/31/2017".    2/13 Pt continues to decline: poor PO intake, now vomiting/gagging after eating or taking meds. Inserted indwelling urinary catheter for urinary retention and urine output is fair.  Sent message to Oncologist Dr. Calzada to inquire about whether patient is candidate for Hospice.  Received text message from GI today noting that he will do EGD tomorrow as total bilirubin has trended up again.  Spoke to son and sister on 2/13 about plan for home with Home Health or Hospice when cleared " by GI.  Mary Villatoro, Transition Navigator, will assist with discharge planning.  2/15/17.  Discussed patient's care with Dr. Reyna yesterday.  It is his opinion that patient would benefit from Hospice.  Had a meeting with patient, his son Jamel, his sister, Katy, and his X-wife.  Patient would like to go home with hospice and would like to be a DNR. DNR order signed by Dr. Hansen .    Patient is discharging to home with his family and [facility] services.        Consults:   Consults         Status Ordering Provider     Gastroenterology  Once     Provider:  Josh Reyna MD    Final result ELIAN ALICIA     Inpatient consult to Social Work  Once     Provider:  (Not yet assigned)    Acknowledged PAN HARRINGTON          Significant Diagnostic Studies: Labs:   CMP   Recent Labs  Lab 02/14/17  0640 02/15/17  0638   * 131*   K 3.9 4.1    106   CO2 14* 13*    89   BUN 20 16   CREATININE 0.6 0.7   CALCIUM 8.1* 8.3*   PROT 4.9* 4.8*   ALBUMIN 2.8* 3.0*   BILITOT 7.6* 10.0*   ALKPHOS 265* 267*   AST 78* 77*   ALT 30 28   ANIONGAP 11 12   ESTGFRAFRICA >60 >60   EGFRNONAA >60 >60   , CBC   Recent Labs  Lab 02/14/17  0640   WBC 8.61   HGB 13.1*   HCT 39.4*   PLT 40*   , INR   Lab Results   Component Value Date    INR 1.6 (H) 02/14/2017    INR 2.1 (H) 02/13/2017    INR 3.6 (H) 02/12/2017    and Lipid Panel   Lab Results   Component Value Date    CHOL 222 (H) 04/20/2016    HDL 38 (L) 04/20/2016    LDLCALC 163.8 (H) 04/20/2016    TRIG 101 04/20/2016    CHOLHDL 17.1 (L) 04/20/2016       Pending Diagnostic Studies:     None        Final Active Diagnoses:    Diagnosis Date Noted POA    PRINCIPAL PROBLEM:  Acute cholangitis [K83.0] 02/08/2017 Yes    Urinary retention with incomplete bladder emptying [R33.9] 02/12/2017 Yes    Ascites [R18.8] 02/11/2017 Yes    Hyperbilirubinemia [E80.6] 02/08/2017 Yes    Hyponatremia [E87.1] 02/07/2017 Yes    History of biliary duct stent placement 2/03/17  "[Z98.890] 02/03/2017 Not Applicable    Portal vein thrombosis [I81] 02/01/2017 Yes    Abnormal liver enzymes [R74.8] 01/31/2017 Yes    Elevated bilirubin [R17] 01/31/2017 Yes    Biliary obstruction due to cancer [K83.1] 01/31/2017 Yes    Chemotherapy-induced thrombocytopenia [D69.59, T45.1X5A] 10/31/2016 Yes    Anxiety [F41.9] 08/09/2016 Yes    Metastasis to liver [C78.7] 06/23/2016 Yes     Chronic    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] 06/23/2016 Yes     Chronic    Fatty liver [K76.0] 05/03/2016 Yes     Chronic    History of colon cancer [Z85.038] 04/25/2016 Yes     Chronic    HTN (hypertension), benign [I10] 11/30/2012 Yes     Chronic    GERD (gastroesophageal reflux disease) [K21.9]  Yes     Chronic      Problems Resolved During this Admission:    Diagnosis Date Noted Date Resolved POA    Idiopathic hypotension [I95.0] 02/09/2017 02/11/2017 Yes    Sepsis associated hypotension [A41.9] 02/09/2017 02/10/2017 Yes    Acute cystitis without hematuria [N30.00] 02/09/2017 02/11/2017 Yes      * Acute cholangitis  Biliary obstruction due to cancer, biliary stent placement 2/3/17  Abnormal liver enzymes  Elevated bilirubin  Tbili 10 today, increasing  Alk Phos and AST stable. Seen by GI who has no intervention planned: "Suspect mild cholangitis but no evidence for stent occlusion. Continue antibiotics and trend liver tests but no role for ERCP at this time.".  Spoke with Dr. Reyna who feels that Hospice would benefit this patient.    Received zosyn x 3 days. Started on ceftriaxone and metronidazole, day #6.  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 Abdominal ultrasound ordered to assess and shows same amount "mild to moderate" of ascites as seen on MRI 1/31/2017. Stopped IVF and started on furosemide which may renal function worse.  Stopped furosemide.  Suspect worsening liver problems.  Oncologist is Dr. Calzada. Outpatient work-up planned.  Sent message to Dr. Calzada on 2/13 with no response.  Continue pain management. Was on coumadin for portal vein thrombosis.  Discontinued for persistent thrombocytopenia and supratherapeutic INR. Gave 1 dose of vitamin K on 2/12.    Started on lactulose for hepatic encephalopathy.  Patient is requesting home with hospice. Fentanyl patch added for comfort.  Will d/c lactulose    Anxiety  Pt appears depressed.  On PRN xanax for anxiety at home.       Chemotherapy-induced thrombocytopenia  Platelet count 46 today. No visible, active bleeding. Discontinued home coumadin.     Biliary obstruction due to cancer  Abnormal liver enzymes  Elevated bilirubin  Hyponatremia   Tbili 5.6 today, down from 8.2 on admission.  Decreased in LFTs this morning.  Seen by GI who has no intervention planned: "Suspect mild cholangitis but no evidence for stent occlusion. Continue antibiotics and trend liver tests but no role for ERCP at this time."     D/C antibiotics. Comfort care with [facility]    Urinary retention with incomplete bladder emptying  Pt c/o urinary retention on admission. States that he has had difficulty urinating for weeks.  No evidence of UTI.  No hydronephrosis seen on imaging and renal function is normal except for elevated BUN likely 2/2 dehydration.  Indwelling catheter on 2/12/2017 home with Hospice.         Discharged Condition: poor    Disposition: Home or Self Care    Follow Up:  Follow-up Information     Follow up with Karoline Calzada MD.    Specialty:  Hematology and Oncology    Why:  As needed    Contact information:    [address] [place] [address] " [address]  [facility] [address]  [phone]          Patient Instructions:     Diet general     Activity as tolerated     Irving Care per Hospice Teaching    Medications:  Reconciled Home Medications:   Current Discharge Medication List      START taking these medications    Details   fentaNYL (DURAGESIC) 50 mcg/hr Place 1 patch onto the skin every 72 hours.  Qty: 10 patch, Refills: 0      morphine 10 mg/5 mL solution Take 7.5 mLs (15 mg total) by mouth every 3 (three) hours as needed for Pain.  Refills: 0         CONTINUE these medications which have NOT CHANGED    Details   alprazolam (XANAX) 0.25 MG tablet Take 1 tablet (0.25 mg total) by mouth 3 (three) times daily as needed for Anxiety.  Qty: 90 tablet, Refills: 0    Associated Diagnoses: Anxiety      amitriptyline (ELAVIL) 10 MG tablet Take 1 tablet (10 mg total) by mouth 2 (two) times daily.  Qty: 60 tablet, Refills: 1    Associated Diagnoses: Pain      ondansetron (ZOFRAN) 8 MG tablet Take 1 tablet (8 mg total) by mouth every 12 (twelve) hours as needed for Nausea.  Qty: 30 tablet, Refills: 2    Associated Diagnoses: Chemoprophylaxis      oxycodone (ROXICODONE) 30 MG Tab Take 1 tablet (30 mg total) by mouth 4 (four) times daily.  Qty: 120 tablet, Refills: 0    Associated Diagnoses: Pain      pantoprazole (PROTONIX) 20 MG tablet Take 20 mg by mouth once daily.  Refills: 3      prochlorperazine (COMPAZINE) 10 MG tablet Take 1 tablet (10 mg total) by mouth every 6 (six) hours as needed.  Qty: 30 tablet, Refills: 1    Associated Diagnoses: Chemoprophylaxis         STOP taking these medications       lisinopril (PRINIVIL,ZESTRIL) 20 MG tablet Comments:   Reason for Stopping:             Time spent on the discharge of patient: 40 minutes    Zara Garcia NP  Department of Hospital Medicine  [facility]

## 2017-02-15 NOTE — ASSESSMENT & PLAN NOTE
"Biliary obstruction due to cancer, biliary stent placement 2/3/17  Abnormal liver enzymes  Elevated bilirubin  Tbili 10 today, increasing  Alk Phos and AST stable. Seen by GI who has no intervention planned: "Suspect mild cholangitis but no evidence for stent occlusion. Continue antibiotics and trend liver tests but no role for ERCP at this time.".  Spoke with Dr. Reyna who feels that Hospice would benefit this patient.    Received zosyn x 3 days. Started on ceftriaxone and metronidazole, day #6.  Will not continue antibiotics, as patient is going home with Hospice    "

## 2017-02-15 NOTE — ASSESSMENT & PLAN NOTE
"Portal vein thrombosis   H/O colon cancer  Hyponatremia   Ascites   Pt's abdomen appears distended. Abdominal ultrasound ordered to assess and shows same amount "mild to moderate" of ascites as seen on MRI 1/31/2017. Stopped IVF and started on furosemide which may renal function worse.  Stopped furosemide.  Suspect worsening liver problems.  Oncologist is Dr. Calzada. Outpatient work-up planned.  Sent message to Dr. Calzada on 2/13 with no response.  Continue pain management. Was on coumadin for portal vein thrombosis.  Discontinued for persistent thrombocytopenia and supratherapeutic INR. Gave 1 dose of vitamin K on 2/12.    Started on lactulose for hepatic encephalopathy.  Patient is requesting home with hospice. Fentanyl patch added for comfort.  Will d/c lactulose  "

## 2017-02-15 NOTE — PLAN OF CARE
Pt is being discharged. Discharge teaching was reviewed with patient and family. Pt verbalized understanding. Refer to clinical references for pt education. IV removed, tip intact, pt tolerated well. Tele box removed. Pt and family instructed of bullock catheter care and leg bag use at home. Pt awaiting transport. Family will be taking pt home.

## 2017-02-15 NOTE — ASSESSMENT & PLAN NOTE
"Biliary obstruction due to cancer, biliary stent placement 2/3/17  Abnormal liver enzymes  Elevated bilirubin  Tbili 10 today, increasing  Alk Phos and AST stable. Seen by GI who has no intervention planned: "Suspect mild cholangitis but no evidence for stent occlusion. Continue antibiotics and trend liver tests but no role for ERCP at this time.".  Spoke with Dr. Reyna who feels that Hospice would benefit this patient.    Received zosyn x 3 days. Started on ceftriaxone and metronidazole, day #6.  IV Fluids discontinued d/t increasing abdominal edema. Avoid hepatotoxins.  Patient is transitioning to hospice care.     "

## 2017-02-15 NOTE — PLAN OF CARE
Problem: Patient Care Overview  Goal: Plan of Care Review  Outcome: Ongoing (interventions implemented as appropriate)  Plan of care reviewed with patient. Patient verbalized understanding. Patient's family has remained at bedside throughout shift. Patient BP monitored very closely throughout shift; pain medications not administered when SBP<90. Worsening ascites, edema noted to patient's abdomen and upper back. Patient sinus tachycardia on telemetry monitoring, 's-110's. Bed is low and locked, call light is within reach. Patient has been instructed to call if in need of assistance. Patient verbalized understanding.

## 2017-02-15 NOTE — PLAN OF CARE
Future Appointments  Date Time Provider Department Center   9/21/2017 1:30 PM Luis M Ruelas MD [facility] MED [place]        02/15/17 1358   Final Note   Assessment Type Final Discharge Note   Discharge Disposition [facility]   Discharge planning education complete? Yes   Hospital Follow Up  Appt(s) scheduled? No   Discharge plans and expectations educations in teach back method with documentation complete? Yes   Discharge/Hospital Encounter Summary to (non-[facility]) PCP No   Referral to Outpatient Case Management complete? No   Referral to / orders for Home Health Complete? No   30 day supply of medicines given at discharge, if documented non-compliance / non-adherence? No   Any social issues identified prior to discharge? Yes   Did you assess the readiness or willingness of the family or caregiver to support self management of care? Yes   Right Care Referral Info   Post Acute Recommendation Other   Referral Type Home Hospice   Facility Name [facility]     Family to transport patient to home.    Mary Villatoro, RN, CCM, CMSRN  RN Transition Navigator  [phone]

## 2017-02-15 NOTE — SUBJECTIVE & OBJECTIVE
Interval History:   Continued to have hypotension during the night and was received another 1 liter bolus. However, his abdomen is becoming more distended.  IV lasix given.     Discussed patient's care with Dr. Reyna yesterday.  It is his opinion that patient would benefit from Hospice.  Had a meeting with patient, his son Jamel, his sister, Katy, and his X-wife.  Patient would like to go home with hospice and would like to be a DNR. DNR order signed by Dr. Hansen and Social Work consulted for Home Hospice.  Fentanyl patch added for pain control.     Review of Systems   Constitutional: Positive for fatigue. Negative for chills and fever.   Respiratory: Negative for cough and shortness of breath.    Cardiovascular: Negative for chest pain.   Gastrointestinal: Positive for abdominal distention, nausea and vomiting. Negative for abdominal pain.   Musculoskeletal: Positive for back pain.   Neurological: Positive for weakness.     Objective:     Vital Signs (Most Recent):  Temp: 97.5 °F (36.4 °C) (02/15/17 0745)  Pulse: 107 (02/15/17 0745)  Resp: 20 (02/15/17 0745)  BP: 126/79 (02/15/17 0745)  SpO2: (!) 94 % (02/15/17 0833) Vital Signs (24h Range):  Temp:  [97.3 °F (36.3 °C)-98.2 °F (36.8 °C)] 97.5 °F (36.4 °C)  Pulse:  [100-110] 107  Resp:  [14-20] 20  SpO2:  [88 %-94 %] 94 %  BP: ()/(40-79) 126/79     Weight: 89.8 kg (198 lb)  Body mass index is 27.62 kg/(m^2).    Intake/Output Summary (Last 24 hours) at 02/15/17 1128  Last data filed at 02/15/17 0600   Gross per 24 hour   Intake             2660 ml   Output              600 ml   Net             2060 ml      Physical Exam   Constitutional: He appears well-developed. He appears distressed.   Ill-appearing    HENT:   Dry mucous membranes    Eyes: Scleral icterus is present.   Neck: No JVD present.   Cardiovascular: Normal rate and regular rhythm.    Pulmonary/Chest: Effort normal and breath sounds normal. No respiratory distress. He has no wheezes.   Port in  right upper chest.    Abdominal: He exhibits distension. There is tenderness.   Hypoactive bowel sounds   Musculoskeletal: He exhibits no edema.   Neurological: He is alert.   Skin: He is not diaphoretic.   Psychiatric:   Flat affect. Depressed.    Nursing note and vitals reviewed.       Significant Labs:   CBC:   Recent Labs  Lab 02/14/17  0640   WBC 8.61   HGB 13.1*   HCT 39.4*   PLT 40*     CMP:   Recent Labs  Lab 02/14/17  0640 02/15/17  0638   * 131*   K 3.9 4.1    106   CO2 14* 13*    89   BUN 20 16   CREATININE 0.6 0.7   CALCIUM 8.1* 8.3*   PROT 4.9* 4.8*   ALBUMIN 2.8* 3.0*   BILITOT 7.6* 10.0*   ALKPHOS 265* 267*   AST 78* 77*   ALT 30 28   ANIONGAP 11 12   EGFRNONAA >60 >60     Coagulation:   Recent Labs  Lab 02/14/17  0640   INR 1.6*       Significant Imaging:  No new

## 2017-02-15 NOTE — DISCHARGE INSTRUCTIONS
ASCITES (ENGLISH) View Edit Remove  TOTAL BILIRUBIN (BLOOD) (ENGLISH) View Edit Remove  HYPONATREMIA (ENGLISH) View Edit Remove  URINARY RETENTION, MALE (ENGLISH) View Edit Remove  KOENIG CATHETER, CARE (ENGLISH) View Edit Remove  ERCP, DISCHARGE INSTRUCTIONS FOR (ENGLISH) View Edit Remove  FENTANYL SKIN PATCH (ENGLISH) View Edit Remove  MORPHINE ORAL SOLUTION (ENGLISH) View Edit Remove

## 2017-02-15 NOTE — PLAN OF CARE
Problem: Patient Care Overview  Goal: Plan of Care Review  Patient on RA, no respiratory distress noted. Will continue to monitor.   Outcome: Ongoing (interventions implemented as appropriate)  Pt on RA sats 94%.

## 2017-02-15 NOTE — PLAN OF CARE
Dr. Sam notified regarding 82/40 BP; 1000 cc bolus NS to be administered to patient. Pain medications held for SBP<90. Family at bedside. Will continue to monitor.

## 2017-02-15 NOTE — ASSESSMENT & PLAN NOTE
"Abnormal liver enzymes  Elevated bilirubin  Hyponatremia   Tbili 5.6 today, down from 8.2 on admission.  Decreased in LFTs this morning.  Seen by GI who has no intervention planned: "Suspect mild cholangitis but no evidence for stent occlusion. Continue antibiotics and trend liver tests but no role for ERCP at this time."     IVFs discontinued after boluses due to increasing abdominal edema; continue zosyn, day #3.  Avoid hepatotoxins.  Daily BMP and LFTs.   "

## 2017-02-15 NOTE — ASSESSMENT & PLAN NOTE
Serum sodium 131 today from 128 yesterday.  IVF discontinued; l Monitor on telemetry.  Daily BMP.

## 2017-02-15 NOTE — PLAN OF CARE
[facility]     Department of Hospital Medicine     [address] [place] [address]     [phone] after hours  [phone] fax                                   HOSPICE  ORDERS     02/15/2017    Admit to Hospice:  Home Service     Diagnoses:  Active Hospital Problems    Diagnosis  POA    *Acute cholangitis [K83.0]  Yes    Urinary retention with incomplete bladder emptying [R33.9]  Yes    Ascites [R18.8]  Yes    Hyperbilirubinemia [E80.6]  Yes    Hyponatremia [E87.1]  Yes    History of biliary duct stent placement 2/03/17 [Z98.890]  Not Applicable    Portal vein thrombosis [I81]  Yes    Abnormal liver enzymes [R74.8]  Yes    Elevated bilirubin [R17]  Yes    Biliary obstruction due to cancer [K83.1]  Yes    Chemotherapy-induced thrombocytopenia [D69.59, T45.1X5A]  Yes    Anxiety [F41.9]  Yes    Metastasis to liver [C78.7]  Yes     Chronic    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]  Yes     Chronic    Fatty liver [K76.0]  Yes     Chronic    History of colon cancer [Z85.038]  Yes     Chronic    HTN (hypertension), benign [I10]  Yes     Chronic    GERD (gastroesophageal reflux disease) [K21.9]  Yes     Chronic      Resolved Hospital Problems    Diagnosis Date Resolved POA    Idiopathic hypotension [I95.0] 02/11/2017 Yes    Sepsis associated hypotension [A41.9] 02/10/2017 Yes    Acute cystitis without hematuria [N30.00] 02/11/2017 Yes       Hospice Qualifying Diagnoses:  Malignant Neoplasm Metastatic       Patient has a life expectancy < 6 months due to these conditions.    Vital Signs: Routine per Hospice Protocol.    Allergies:Review of patient's allergies indicates:  No Known Allergies    Diet: As tolerated    Activities: As tolerated    Nursing: Per Hospice Routine    Family teaching for [facility]    Future Orders:  Hospice Medical Director may dictate new orders for comfortable care measures & sign death  certificate.    Medications:         Comfort Care Medications Only      [facility]   Home Medication Instructions AUGUSTIN:[id number]    Printed on:02/15/17 4860   Medication Information                      alprazolam (XANAX) 0.25 MG tablet  Take 1 tablet (0.25 mg total) by mouth 3 (three) times daily as needed for Anxiety.             amitriptyline (ELAVIL) 10 MG tablet  Take 1 tablet (10 mg total) by mouth 2 (two) times daily.             fentaNYL (DURAGESIC) 50 mcg/hr  Place 1 patch onto the skin every 72 hours.             morphine 10 mg/5 mL solution  Take 7.5 mLs (15 mg total) by mouth every 3 (three) hours as needed for Pain.             ondansetron (ZOFRAN) 8 MG tablet  Take 1 tablet (8 mg total) by mouth every 12 (twelve) hours as needed for Nausea.             oxycodone (ROXICODONE) 30 MG Tab  Take 1 tablet (30 mg total) by mouth 4 (four) times daily.             pantoprazole (PROTONIX) 20 MG tablet  Take 20 mg by mouth once daily.             prochlorperazine (COMPAZINE) 10 MG tablet  Take 1 tablet (10 mg total) by mouth every 6 (six) hours as needed.                 _________________________________  Zara Garcia NP  02/15/2017

## 2017-02-15 NOTE — ASSESSMENT & PLAN NOTE
"  Metastasis to liver, 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  H/O colon cancer  Portal vein thrombosis   Hyponatremia   Ascites   Pt's abdomen appears distended. Abdominal ultrasound ordered to assess and shows same amount "mild to moderate" of ascites as seen on MRI 1/31/2017. Stopped IVF and started on furosemide which may renal function worse.  Stopped furosemide.  Suspect worsening liver problems.  Oncologist is Dr. Calzada. Outpatient work-up planned.  Sent message to Dr. Calzada on 2/13 with no response.  Continue pain management. Was on coumadin for portal vein thrombosis.  Discontinued for persistent thrombocytopenia and supratherapeutic INR. Gave 1 dose of vitamin K on 2/12.    Started on lactulose for hepatic encephalopathy.  Patient is requesting home with hospice. Fentanyl patch added for comfort.  Will d/c lactulose  "

## 2017-02-15 NOTE — PROGRESS NOTES
[facility] Hospital Medicine  Progress Note    Patient Name: Berlin Villa  MRN: [id number]  Patient Class: IP- Inpatient   Admission Date: 2/8/2017  Length of Stay: 7 days  Attending Physician: Noam Hansen MD  Primary Care Provider: Luis M Ruelas MD        Subjective:     Principal Problem:Acute cholangitis    HPI:  Berlin Villa is a 56 y.o.  male with with hypertension, anxiety, gastroesophageal reflux disease, metastatic colon cancer (originally diagnosed in 2010) to the liver (May 2016) that is currently undergoing chemotherapy with hematologist-oncologist Dr. Karolien Calzdaa. He lives in [place], [place]. His primary care physician is Dr. Luis M Ruelas. He was recently hospitalized at [facility] 1/31/17-2/4/17 for biliary obstruction due to his cancer. He had ERCP with biliary stent placement performed by gastroenterologist Dr. Josh Reyna on 2/03/17 and was discharged later that evening to follow up in clinic in 2-4 weeks.     He was seen by Dr. Calzada in clinic on 2/07/17. He complained of increased weakness and fatigue, accompanied by nausea, vomiting, poor appetite, and back pain (chronic). He was noted to have hypotension and tachycardia during the office visit.  He was sent to [facility] ED for evaluation. He had systolic blood pressure in 80s-90s, HR 110s. Tbili was 8.2 (6.7 on 2/4/17),  (263), AST 93 (57), ALT 33 (17), WBC 11.2 (8.95). The ED physician spoke with Dr. Reyna, who recommended IV fluids and IV antibiotics and to discharge him home from the ED to follow up in clinic the following day with him. He was given 2 liters normal saline bolus, metoclopramide 10 mg IV, and piperacillin-tazobactam 4.5 grams. Due to weakness and fatigue, the ED physician did not feel patient safe discharging him home. He was accepted for transfer and admission to [facility]. He was transferred to [facility].       Hospital Course:  Transferred from  "[facility] for evaluation by GI for biliary obstruction.  Seen by GI on 2/8 who suggests: "56 year old male s/p stent for biliary stricture here with malaise and persistent hyperbilirubinemia, although down from yesterday. Suspect mild cholangitis but no evidence for stent occlusion. Continue antibiotics and trend liver tests but no role for ERCP at this time.  On 2/9, Noted hypotension and tachycardia overnight.  WBC increased to 21K today with BILLIE.  Work-up for sepsis.  Urinalysis suggests UTI.  Given 1L fluid bolus.  Lactic acid 2.0.  Today, 2/10, WBC back to normal and patient looks and feels better.  Urine gram stain shows No WBC's, epithelial cells or organisms. T Bili continues to improve.  Antibiotics adjusted by Pharmacist for more targeted coverage. 2/11 Urine culture shows no growth.  Abdomen more distended with increasing INR despite receiving no coumadin. Liver is not functioning properly likely 2/2 cancer.  Ordered abdominal ultrasound to assess for ascites for paracentesis which shows "mild to moderate amount of ascites, similar to MRI from 1/31/2017".    2/13 Pt continues to decline: poor PO intake, now vomiting/gagging after eating or taking meds. Inserted indwelling urinary catheter for urinary retention and urine output is fair.  Sent message to Oncologist Dr. Calzada to inquire about whether patient is candidate for Hospice.  Received text message from GI today noting that he will do EGD tomorrow as total bilirubin has trended up again.  Spoke to son and sister on 2/13 about plan for home with Home Health or Hospice when cleared by GI.  Mary Villatoro, Transition Navigator, will assist with discharge planning.  2/15/17.  Discussed patient's care with Dr. Reyna yesterday.  It is his opinion that patient would benefit from Hospice.  Had a meeting with patient, his son Jamel, his sister, Katy, and his X-wife.  Patient would like to go home with hospice and would like to be a DNR. DNR order " signed by Dr. Hansen and Social Work consulted for Home Hospice.       Interval History:   Continued to have hypotension during the night and was received another 1 liter bolus. However, his abdomen is becoming more distended.  IV lasix given.     Discussed patient's care with Dr. Reyna yesterday.  It is his opinion that patient would benefit from Hospice.  Had a meeting with patient, his son Jamel, his sister, Katy, and his X-wife.  Patient would like to go home with hospice and would like to be a DNR. DNR order signed by Dr. Hansen and Social Work consulted for Home Hospice.  Fentanyl patch added for pain control.     Review of Systems   Constitutional: Positive for fatigue. Negative for chills and fever.   Respiratory: Negative for cough and shortness of breath.    Cardiovascular: Negative for chest pain.   Gastrointestinal: Positive for abdominal distention, nausea and vomiting. Negative for abdominal pain.   Musculoskeletal: Positive for back pain.   Neurological: Positive for weakness.     Objective:     Vital Signs (Most Recent):  Temp: 97.5 °F (36.4 °C) (02/15/17 0745)  Pulse: 107 (02/15/17 0745)  Resp: 20 (02/15/17 0745)  BP: 126/79 (02/15/17 0745)  SpO2: (!) 94 % (02/15/17 0833) Vital Signs (24h Range):  Temp:  [97.3 °F (36.3 °C)-98.2 °F (36.8 °C)] 97.5 °F (36.4 °C)  Pulse:  [100-110] 107  Resp:  [14-20] 20  SpO2:  [88 %-94 %] 94 %  BP: ()/(40-79) 126/79     Weight: 89.8 kg (198 lb)  Body mass index is 27.62 kg/(m^2).    Intake/Output Summary (Last 24 hours) at 02/15/17 1128  Last data filed at 02/15/17 0600   Gross per 24 hour   Intake             2660 ml   Output              600 ml   Net             2060 ml      Physical Exam   Constitutional: He appears well-developed. He appears distressed.   Ill-appearing    HENT:   Dry mucous membranes    Eyes: Scleral icterus is present.   Neck: No JVD present.   Cardiovascular: Normal rate and regular rhythm.    Pulmonary/Chest: Effort normal and breath  "sounds normal. No respiratory distress. He has no wheezes.   Port in right upper chest.    Abdominal: He exhibits distension. There is tenderness.   Hypoactive bowel sounds   Musculoskeletal: He exhibits no edema.   Neurological: He is alert.   Skin: He is not diaphoretic.   Psychiatric:   Flat affect. Depressed.    Nursing note and vitals reviewed.       Significant Labs:   CBC:   Recent Labs  Lab 02/14/17  0640   WBC 8.61   HGB 13.1*   HCT 39.4*   PLT 40*     CMP:   Recent Labs  Lab 02/14/17  0640 02/15/17  0638   * 131*   K 3.9 4.1    106   CO2 14* 13*    89   BUN 20 16   CREATININE 0.6 0.7   CALCIUM 8.1* 8.3*   PROT 4.9* 4.8*   ALBUMIN 2.8* 3.0*   BILITOT 7.6* 10.0*   ALKPHOS 265* 267*   AST 78* 77*   ALT 30 28   ANIONGAP 11 12   EGFRNONAA >60 >60     Coagulation:   Recent Labs  Lab 02/14/17  0640   INR 1.6*       Significant Imaging:  No new    Assessment/Plan:      * Acute cholangitis  Biliary obstruction due to cancer, biliary stent placement 2/3/17  Abnormal liver enzymes  Elevated bilirubin  Tbili 10 today, increasing  Alk Phos and AST stable. Seen by GI who has no intervention planned: "Suspect mild cholangitis but no evidence for stent occlusion. Continue antibiotics and trend liver tests but no role for ERCP at this time.".  Spoke with Dr. Reyna who feels that Hospice would benefit this patient.    Received zosyn x 3 days. Started on ceftriaxone and metronidazole, day #6.  IV Fluids discontinued d/t increasing abdominal edema. Avoid hepatotoxins.  Patient is transitioning to hospice care.       GERD (gastroesophageal reflux disease)  PPI      HTN (hypertension), benign  Holding lisinopril due to hypotension  Continue to monitor. SBP .        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 Abdominal ultrasound ordered to assess and shows same amount "mild to moderate" " of ascites as seen on MRI 1/31/2017. Stopped IVF and started on furosemide which may renal function worse.  Stopped furosemide.  Suspect worsening liver problems.  Oncologist is Dr. Calzada. Outpatient work-up planned.  Sent message to Dr. Calzada on 2/13 with no response.  Continue pain management. Was on coumadin for portal vein thrombosis.  Discontinued for persistent thrombocytopenia and supratherapeutic INR. Gave 1 dose of vitamin K on 2/12.    Started on lactulose for hepatic encephalopathy.  Patient is requesting home with hospice. Fentanyl patch added for comfort.  Will d/c lactulose    Anxiety  Pt appears depressed.  On PRN xanax for anxiety at home.       Chemotherapy-induced thrombocytopenia  Platelet count 46 today. No visible, active bleeding. Discontinued home coumadin. Continue to monitor.      Hyponatremia  Serum sodium 131 today from 128 yesterday.  IVF discontinued; l Monitor on telemetry.  Daily BMP.      Urinary retention with incomplete bladder emptying  Pt c/o urinary retention on admission. States that he has had difficulty urinating for weeks.  No evidence of UTI.  No hydronephrosis seen on imaging and renal function is normal except for elevated BUN likely 2/2 dehydration.  Ordered indwelling catheter on 2/12/2017 and patient may see Urology as an outpatient unless he goes home with Hospice.       VTE Risk Mitigation         Ordered     High Risk of VTE  Once      02/08/17 0035     Place sequential compression device  Until discontinued      02/08/17 0035     Place RENATA hose  Until discontinued      02/08/17 0035          Zara Garcia NP  Department of Hospital Medicine   [facility]

## 2017-02-15 NOTE — PROGRESS NOTES
Clemente with [facility] meeting with patient and son at bedside to discuss home hospice.    Mary Villatoro, RN, CCM, CMSRN  RN Transition Navigator  [phone]

## 2017-02-15 NOTE — PROGRESS NOTES
Received hospice consult. Met with patient and son at bedside. No preference for hospice company. Called Shelley with [facility] and they service [facility]. Gave her son Berlin phone number [phone] to set up hospice meeting. Shelley with [facility] checked insurance and they are NOT in network with patient's insurance.    Faxed facesheet to [facility] for hospice referral.    Mary Villatoro RN, CCM, CMSRN  RN Transition Navigator  [phone]

## 2017-04-04 ENCOUNTER — TELEPHONE (OUTPATIENT)
Dept: HEMATOLOGY/ONCOLOGY | Facility: CLINIC | Age: 57
End: 2017-04-04

## 2017-09-05 ENCOUNTER — DOCUMENTATION ONLY (OUTPATIENT)
Dept: FAMILY MEDICINE | Facility: CLINIC | Age: 57
End: 2017-09-05

## 2017-09-05 NOTE — PROGRESS NOTES
Pre-Visit Chart Review  For Appointment Scheduled on 09/21/2017    Health Maintenance Due   Topic Date Due    Pneumococcal PCV13 (High Risk) (1 - PCV13 Required) [birth year]02/1962    TETANUS VACCINE  07/02/1979    Pneumococcal PPSV23 (High Risk) (1) 07/02/1979    Colonoscopy  05/21/2017    Influenza Vaccine  08/01/2017

## 2020-01-10 NOTE — PROGRESS NOTES
"[facility]    CC: Hyperbilirubinemia    HPI 56 y.o. male with metastatic colon cancer with biliary stricture s/p stent placed 2/4 by Dr. Reyna. He denies abdominal pain and is afebrile, however has experienced some hypotension.       Past Medical History   Diagnosis Date    Arthritis      sacroilitis    Cancer 1999     colon cancer    Cataract     Colon cancer     Degenerative disc disease     Foot pain     GERD (gastroesophageal reflux disease)     Hypertension          Review of Systems  General ROS: negative for chills, fever or weight loss  Cardiovascular ROS: no chest pain or dyspnea on exertion  Gastrointestinal ROS: no worsening of chronic abdominal pain, no hematemesis, no blood in stool    Physical Examination  Visit Vitals    /80 (BP Location: Right arm, Patient Position: Lying, BP Method: Automatic)    Pulse 100    Temp 98 °F (36.7 °C) (Oral)    Resp 20    Ht 5' 11" (1.803 m)    Wt 89.8 kg (198 lb)    SpO2 (!) 92%    BMI 27.62 kg/m2     General appearance: alert, cooperative, fatigued appearing  HENT: Normocephalic, atraumatic, neck symmetrical, no nasal discharge   Lungs: clear to auscultation bilaterally, no dullness to percussion bilaterally  Heart: regular rate and rhythm without rub; no displacement of the PMI   Abdomen: moderate distention, soft, BS present  Extremities: extremities symmetric; no clubbing, cyanosis, or edema  Neurologic: Alert and oriented X 3, normal strength, normal coordination and gait    Labs:  Lab Results   Component Value Date    WBC 21.31 (H) 02/09/2017    HGB 13.8 (L) 02/09/2017    HCT 41.9 02/09/2017    MCV 97 02/09/2017    PLT 62 (L) 02/09/2017     CMP  Sodium   Date Value Ref Range Status   02/09/2017 129 (L) 136 - 145 mmol/L Final     Potassium   Date Value Ref Range Status   02/09/2017 4.1 3.5 - 5.1 mmol/L Final     Chloride   Date Value Ref Range Status   02/09/2017 100 95 - 110 mmol/L Final     CO2   Date Value Ref Range Status " "Subjective:   Jamal Childress is a 27 y.o. male who presents for Cough (x5 days. Cough, sore throat, congestion, epistaxis.)        27-year-old male presents to the urgent care with mother with a chief complaint of sore throat, cough, congestion for the past 5 days.  The patient planes of vomiting 2 days prior which is since resolved.  The patient did not receive the seasonal flu vaccination.    Cough   The cough is productive of sputum (Productive of phlegm). Associated symptoms include a sore throat and shortness of breath (Mild shortness of breath). Pertinent negatives include no chest pain, chills, fever, myalgias or rash. Nothing aggravates the symptoms. He has tried OTC cough suppressant for the symptoms. The treatment provided mild relief. There is no history of asthma.     Review of Systems   Constitutional: Negative for chills and fever.   HENT: Positive for congestion, nosebleeds (right sided, onset this morning, resolved spontaneously) and sore throat.    Eyes: Negative for pain.   Respiratory: Positive for cough and shortness of breath (Mild shortness of breath).    Cardiovascular: Negative for chest pain.   Gastrointestinal: Negative for nausea and vomiting.   Genitourinary: Negative for hematuria.   Musculoskeletal: Negative for myalgias.   Skin: Negative for rash.   Neurological: Negative for dizziness.     No Known Allergies   Objective:   /70   Pulse 83   Temp 36.7 °C (98.1 °F)   Ht 1.753 m (5' 9\")   Wt 62.6 kg (138 lb)   SpO2 98%   BMI 20.38 kg/m²   Physical Exam  Vitals signs and nursing note reviewed.   Constitutional:       General: He is not in acute distress.     Appearance: He is well-developed.   HENT:      Head: Normocephalic and atraumatic.      Right Ear: Tympanic membrane and external ear normal.      Left Ear: Tympanic membrane and external ear normal.      Nose: Congestion and rhinorrhea present.      Right Turbinates: Swollen.      Comments: Right clots noted, no "   02/09/2017 22 (L) 23 - 29 mmol/L Final     Glucose   Date Value Ref Range Status   02/09/2017 115 (H) 70 - 110 mg/dL Final     BUN, Bld   Date Value Ref Range Status   02/09/2017 24 (H) 6 - 20 mg/dL Final     Creatinine   Date Value Ref Range Status   02/09/2017 1.3 0.5 - 1.4 mg/dL Final     Calcium   Date Value Ref Range Status   02/09/2017 7.7 (L) 8.7 - 10.5 mg/dL Final     Total Protein   Date Value Ref Range Status   02/09/2017 4.8 (L) 6.0 - 8.4 g/dL Final     Albumin   Date Value Ref Range Status   02/09/2017 1.7 (L) 3.5 - 5.2 g/dL Final     Total Bilirubin   Date Value Ref Range Status   02/09/2017 5.3 (H) 0.1 - 1.0 mg/dL Final     Comment:     For infants and newborns, interpretation of results should be based  on gestational age, weight and in agreement with clinical  observations.  Premature Infant recommended reference ranges:  Up to 24 hours.............<8.0 mg/dL  Up to 48 hours............<12.0 mg/dL  3-5 days..................<15.0 mg/dL  6-29 days.................<15.0 mg/dL       Alkaline Phosphatase   Date Value Ref Range Status   02/09/2017 270 (H) 55 - 135 U/L Final     AST   Date Value Ref Range Status   02/09/2017 75 (H) 10 - 40 U/L Final     ALT   Date Value Ref Range Status   02/09/2017 28 10 - 44 U/L Final     Anion Gap   Date Value Ref Range Status   02/09/2017 7 (L) 8 - 16 mmol/L Final     eGFR if    Date Value Ref Range Status   02/09/2017 >60 >60 mL/min/1.73 m^2 Final     eGFR if non    Date Value Ref Range Status   02/09/2017 >60 >60 mL/min/1.73 m^2 Final     Comment:     Calculation used to obtain the estimated glomerular filtration  rate (eGFR) is the CKD-EPI equation. Since race is unknown   in our information system, the eGFR values for   -American and Non--American patients are given   for each creatinine result.           Imaging:  CXR- clear lungs    Assessment:   57 yo male with metastatic colon cancer causing biliary obstruction  active bleeding     Mouth/Throat:      Mouth: Mucous membranes are moist.   Eyes:      Conjunctiva/sclera: Conjunctivae normal.      Pupils: Pupils are equal, round, and reactive to light.   Cardiovascular:      Rate and Rhythm: Normal rate and regular rhythm.      Heart sounds: No murmur.   Pulmonary:      Effort: Pulmonary effort is normal. No respiratory distress.      Breath sounds: Normal breath sounds. No wheezing or rhonchi.   Abdominal:      General: There is no distension.      Palpations: Abdomen is soft.      Tenderness: There is no tenderness.   Musculoskeletal: Normal range of motion.   Skin:     General: Skin is warm and dry.   Neurological:      General: No focal deficit present.      Mental Status: He is alert and oriented to person, place, and time. Mental status is at baseline.      Gait: Gait (gait at baseline) normal.   Psychiatric:         Judgment: Judgment normal.           Assessment/Plan:   1. Viral URI with cough      Discussed close monitoring, return precautions, and supportive measures including maintaining adequate fluid hydration and caloric intake, relative rest and OTC symptom management including acetaminophen as needed for pain and/or fever.    Differential diagnosis, natural history, supportive care, and indications for immediate follow-up discussed.     Advised the patient to follow-up with the primary care physician for recheck, reevaluation, and consideration of further management.   s/p recent biliary stent, presented from oncology clinic with weakness. Suspect presentation due to sepsis likely from UTI, denies abdominal pain, bilirubin trending down.    Plan:  -Continue treatment for sepsis with IV antibiotics (also would check urine culture)  -As bilirubin continues to improve do not believe presentation is related to biliary stent dysfunction  -Daily CBC, CMP, PT/INR  -Hold Coumadin      Rosalind Shepard MD  Pager [phone]